# Patient Record
Sex: MALE | Race: WHITE | URBAN - METROPOLITAN AREA
[De-identification: names, ages, dates, MRNs, and addresses within clinical notes are randomized per-mention and may not be internally consistent; named-entity substitution may affect disease eponyms.]

---

## 2017-07-13 ENCOUNTER — RECORDS - HEALTHEAST (OUTPATIENT)
Dept: LAB | Facility: CLINIC | Age: 82
End: 2017-07-13

## 2017-07-13 LAB
CHOLEST SERPL-MCNC: 123 MG/DL
FASTING STATUS PATIENT QL REPORTED: ABNORMAL
HDLC SERPL-MCNC: 33 MG/DL
LDLC SERPL CALC-MCNC: 53 MG/DL
TRIGL SERPL-MCNC: 183 MG/DL

## 2017-07-14 ENCOUNTER — MEDICAL CORRESPONDENCE (OUTPATIENT)
Dept: HEALTH INFORMATION MANAGEMENT | Facility: CLINIC | Age: 82
End: 2017-07-14

## 2018-01-03 ENCOUNTER — RECORDS - HEALTHEAST (OUTPATIENT)
Dept: LAB | Facility: CLINIC | Age: 83
End: 2018-01-03

## 2018-01-03 ENCOUNTER — TRANSFERRED RECORDS (OUTPATIENT)
Dept: HEALTH INFORMATION MANAGEMENT | Facility: CLINIC | Age: 83
End: 2018-01-03

## 2018-01-03 LAB
ALBUMIN SERPL-MCNC: 3.8 G/DL (ref 3.5–5)
ALP SERPL-CCNC: 70 U/L (ref 45–120)
ALT SERPL W P-5'-P-CCNC: 16 U/L (ref 0–45)
ANION GAP SERPL CALCULATED.3IONS-SCNC: 14 MMOL/L (ref 5–18)
AST SERPL W P-5'-P-CCNC: 14 U/L (ref 0–40)
BILIRUB SERPL-MCNC: 0.9 MG/DL (ref 0–1)
BUN SERPL-MCNC: 53 MG/DL (ref 8–28)
CALCIUM SERPL-MCNC: 9.2 MG/DL (ref 8.5–10.5)
CHLORIDE BLD-SCNC: 92 MMOL/L (ref 98–107)
CHOLEST SERPL-MCNC: 94 MG/DL
CO2 SERPL-SCNC: 22 MMOL/L (ref 22–31)
CREAT SERPL-MCNC: 3.34 MG/DL (ref 0.7–1.3)
FASTING STATUS PATIENT QL REPORTED: ABNORMAL
GFR SERPL CREATININE-BSD FRML MDRD: 17 ML/MIN/1.73M2
GLUCOSE BLD-MCNC: 654 MG/DL (ref 70–125)
HDLC SERPL-MCNC: 24 MG/DL
LDLC SERPL CALC-MCNC: 25 MG/DL
POTASSIUM BLD-SCNC: 4.2 MMOL/L (ref 3.5–5)
PROT SERPL-MCNC: 6.6 G/DL (ref 6–8)
SODIUM SERPL-SCNC: 128 MMOL/L (ref 136–145)
TRIGL SERPL-MCNC: 226 MG/DL

## 2018-01-05 ENCOUNTER — RECORDS - HEALTHEAST (OUTPATIENT)
Dept: LAB | Facility: CLINIC | Age: 83
End: 2018-01-05

## 2018-01-05 LAB
ANION GAP SERPL CALCULATED.3IONS-SCNC: 12 MMOL/L (ref 5–18)
BUN SERPL-MCNC: 62 MG/DL (ref 8–28)
CALCIUM SERPL-MCNC: 8.9 MG/DL (ref 8.5–10.5)
CHLORIDE BLD-SCNC: 94 MMOL/L (ref 98–107)
CO2 SERPL-SCNC: 20 MMOL/L (ref 22–31)
CREAT SERPL-MCNC: 3.87 MG/DL (ref 0.7–1.3)
GFR SERPL CREATININE-BSD FRML MDRD: 15 ML/MIN/1.73M2
GLUCOSE BLD-MCNC: 662 MG/DL (ref 70–125)
POTASSIUM BLD-SCNC: 4.4 MMOL/L (ref 3.5–5)
SODIUM SERPL-SCNC: 126 MMOL/L (ref 136–145)

## 2018-01-07 ENCOUNTER — APPOINTMENT (OUTPATIENT)
Dept: CT IMAGING | Facility: CLINIC | Age: 83
End: 2018-01-07
Attending: FAMILY MEDICINE
Payer: COMMERCIAL

## 2018-01-07 ENCOUNTER — MEDICAL CORRESPONDENCE (OUTPATIENT)
Dept: HEALTH INFORMATION MANAGEMENT | Facility: CLINIC | Age: 83
End: 2018-01-07

## 2018-01-07 ENCOUNTER — HOSPITAL ENCOUNTER (EMERGENCY)
Facility: CLINIC | Age: 83
Discharge: ANOTHER HOSPITAL | End: 2018-01-08
Attending: FAMILY MEDICINE | Admitting: FAMILY MEDICINE
Payer: COMMERCIAL

## 2018-01-07 DIAGNOSIS — N17.9 ACUTE KIDNEY INJURY (H): ICD-10-CM

## 2018-01-07 DIAGNOSIS — I61.9: ICD-10-CM
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
## 2018-01-07 DIAGNOSIS — Z79.4 TYPE 2 DIABETES MELLITUS WITH HYPERGLYCEMIA, WITH LONG-TERM CURRENT USE OF INSULIN (H): ICD-10-CM

## 2018-01-07 DIAGNOSIS — E11.65 TYPE 2 DIABETES MELLITUS WITH HYPERGLYCEMIA, WITH LONG-TERM CURRENT USE OF INSULIN (H): ICD-10-CM

## 2018-01-07 DIAGNOSIS — S01.512A LACERATION OF TONGUE, INITIAL ENCOUNTER: ICD-10-CM

## 2018-01-07 LAB
ALBUMIN SERPL-MCNC: 3.9 G/DL (ref 3.4–5)
ALP SERPL-CCNC: 74 U/L (ref 40–150)
ALT SERPL W P-5'-P-CCNC: 21 U/L (ref 0–70)
ANION GAP SERPL CALCULATED.3IONS-SCNC: 14 MMOL/L (ref 3–14)
AST SERPL W P-5'-P-CCNC: 20 U/L (ref 0–45)
BASE DEFICIT BLDV-SCNC: 1.2 MMOL/L
BASOPHILS # BLD AUTO: 0 10E9/L (ref 0–0.2)
BASOPHILS NFR BLD AUTO: 0.1 %
BILIRUB SERPL-MCNC: 0.7 MG/DL (ref 0.2–1.3)
BUN SERPL-MCNC: 70 MG/DL (ref 7–30)
CALCIUM SERPL-MCNC: 10.9 MG/DL (ref 8.5–10.1)
CHLORIDE SERPL-SCNC: 96 MMOL/L (ref 94–109)
CO2 SERPL-SCNC: 22 MMOL/L (ref 20–32)
CREAT SERPL-MCNC: 3.3 MG/DL (ref 0.66–1.25)
DIFFERENTIAL METHOD BLD: ABNORMAL
EOSINOPHIL # BLD AUTO: 0 10E9/L (ref 0–0.7)
EOSINOPHIL NFR BLD AUTO: 0.4 %
ERYTHROCYTE [DISTWIDTH] IN BLOOD BY AUTOMATED COUNT: 12.3 % (ref 10–15)
GFR SERPL CREATININE-BSD FRML MDRD: 18 ML/MIN/1.7M2
GLUCOSE BLDC GLUCOMTR-MCNC: 481 MG/DL (ref 70–99)
GLUCOSE SERPL-MCNC: 472 MG/DL (ref 70–99)
HCO3 BLDV-SCNC: 25 MMOL/L (ref 21–28)
HCT VFR BLD AUTO: 37.3 % (ref 40–53)
HGB BLD-MCNC: 13.5 G/DL (ref 13.3–17.7)
IMM GRANULOCYTES # BLD: 0 10E9/L (ref 0–0.4)
IMM GRANULOCYTES NFR BLD: 0.2 %
KETONES BLD-SCNC: 1.1 MMOL/L (ref 0–0.6)
LYMPHOCYTES # BLD AUTO: 1 10E9/L (ref 0.8–5.3)
LYMPHOCYTES NFR BLD AUTO: 11.9 %
MCH RBC QN AUTO: 31.8 PG (ref 26.5–33)
MCHC RBC AUTO-ENTMCNC: 36.2 G/DL (ref 31.5–36.5)
MCV RBC AUTO: 88 FL (ref 78–100)
MONOCYTES # BLD AUTO: 0.5 10E9/L (ref 0–1.3)
MONOCYTES NFR BLD AUTO: 6 %
NEUTROPHILS # BLD AUTO: 6.6 10E9/L (ref 1.6–8.3)
NEUTROPHILS NFR BLD AUTO: 81.4 %
PCO2 BLDV: 45 MM HG (ref 40–50)
PH BLDV: 7.35 PH (ref 7.32–7.43)
PLATELET # BLD AUTO: 139 10E9/L (ref 150–450)
PO2 BLDV: 19 MM HG (ref 25–47)
POTASSIUM SERPL-SCNC: 4.1 MMOL/L (ref 3.4–5.3)
PROT SERPL-MCNC: 7.4 G/DL (ref 6.8–8.8)
RADIOLOGIST FLAGS: ABNORMAL
RBC # BLD AUTO: 4.25 10E12/L (ref 4.4–5.9)
SODIUM SERPL-SCNC: 132 MMOL/L (ref 133–144)
WBC # BLD AUTO: 8.1 10E9/L (ref 4–11)

## 2018-01-07 PROCEDURE — 93010 ELECTROCARDIOGRAM REPORT: CPT | Mod: Z6 | Performed by: FAMILY MEDICINE

## 2018-01-07 PROCEDURE — 81015 MICROSCOPIC EXAM OF URINE: CPT | Performed by: FAMILY MEDICINE

## 2018-01-07 PROCEDURE — 80053 COMPREHEN METABOLIC PANEL: CPT | Performed by: FAMILY MEDICINE

## 2018-01-07 PROCEDURE — 25000128 H RX IP 250 OP 636: Performed by: FAMILY MEDICINE

## 2018-01-07 PROCEDURE — 99285 EMERGENCY DEPT VISIT HI MDM: CPT | Mod: 25

## 2018-01-07 PROCEDURE — 00000146 ZZHCL STATISTIC GLUCOSE BY METER IP

## 2018-01-07 PROCEDURE — 82803 BLOOD GASES ANY COMBINATION: CPT | Performed by: FAMILY MEDICINE

## 2018-01-07 PROCEDURE — 72125 CT NECK SPINE W/O DYE: CPT

## 2018-01-07 PROCEDURE — 70450 CT HEAD/BRAIN W/O DYE: CPT

## 2018-01-07 PROCEDURE — 85025 COMPLETE CBC W/AUTO DIFF WBC: CPT | Performed by: FAMILY MEDICINE

## 2018-01-07 PROCEDURE — 82010 KETONE BODYS QUAN: CPT | Performed by: FAMILY MEDICINE

## 2018-01-07 PROCEDURE — 93005 ELECTROCARDIOGRAM TRACING: CPT

## 2018-01-07 PROCEDURE — 96374 THER/PROPH/DIAG INJ IV PUSH: CPT

## 2018-01-07 PROCEDURE — 81003 URINALYSIS AUTO W/O SCOPE: CPT | Performed by: FAMILY MEDICINE

## 2018-01-07 PROCEDURE — 96361 HYDRATE IV INFUSION ADD-ON: CPT

## 2018-01-07 PROCEDURE — 25000131 ZZH RX MED GY IP 250 OP 636 PS 637: Performed by: FAMILY MEDICINE

## 2018-01-07 PROCEDURE — 99291 CRITICAL CARE FIRST HOUR: CPT | Mod: 25 | Performed by: FAMILY MEDICINE

## 2018-01-07 PROCEDURE — 87086 URINE CULTURE/COLONY COUNT: CPT | Performed by: FAMILY MEDICINE

## 2018-01-07 RX ORDER — SODIUM CHLORIDE 9 MG/ML
1000 INJECTION, SOLUTION INTRAVENOUS CONTINUOUS
Status: DISCONTINUED | OUTPATIENT
Start: 2018-01-07 | End: 2018-01-08 | Stop reason: HOSPADM

## 2018-01-07 RX ORDER — LORAZEPAM 2 MG/ML
0.5 INJECTION INTRAMUSCULAR ONCE
Status: COMPLETED | OUTPATIENT
Start: 2018-01-07 | End: 2018-01-07

## 2018-01-07 RX ADMIN — SODIUM CHLORIDE 1000 ML: 9 INJECTION, SOLUTION INTRAVENOUS at 22:24

## 2018-01-07 RX ADMIN — INSULIN HUMAN 10 UNITS: 100 INJECTION, SOLUTION PARENTERAL at 22:25

## 2018-01-07 RX ADMIN — LORAZEPAM 0.5 MG: 2 INJECTION INTRAMUSCULAR; INTRAVENOUS at 21:56

## 2018-01-08 ENCOUNTER — APPOINTMENT (OUTPATIENT)
Dept: PHYSICAL THERAPY | Facility: CLINIC | Age: 83
DRG: 637 | End: 2018-01-08
Attending: STUDENT IN AN ORGANIZED HEALTH CARE EDUCATION/TRAINING PROGRAM
Payer: COMMERCIAL

## 2018-01-08 ENCOUNTER — APPOINTMENT (OUTPATIENT)
Dept: SPEECH THERAPY | Facility: CLINIC | Age: 83
DRG: 637 | End: 2018-01-08
Attending: STUDENT IN AN ORGANIZED HEALTH CARE EDUCATION/TRAINING PROGRAM
Payer: COMMERCIAL

## 2018-01-08 ENCOUNTER — HOSPITAL ENCOUNTER (INPATIENT)
Facility: CLINIC | Age: 83
LOS: 10 days | Discharge: NURSING, REHAB OR LONG-TERM CARE | DRG: 637 | End: 2018-01-18
Attending: PSYCHIATRY & NEUROLOGY | Admitting: PSYCHIATRY & NEUROLOGY
Payer: COMMERCIAL

## 2018-01-08 ENCOUNTER — APPOINTMENT (OUTPATIENT)
Dept: CT IMAGING | Facility: CLINIC | Age: 83
DRG: 637 | End: 2018-01-08
Attending: STUDENT IN AN ORGANIZED HEALTH CARE EDUCATION/TRAINING PROGRAM
Payer: COMMERCIAL

## 2018-01-08 VITALS
SYSTOLIC BLOOD PRESSURE: 107 MMHG | TEMPERATURE: 98.3 F | OXYGEN SATURATION: 97 % | HEART RATE: 113 BPM | WEIGHT: 190 LBS | RESPIRATION RATE: 18 BRPM | DIASTOLIC BLOOD PRESSURE: 64 MMHG

## 2018-01-08 DIAGNOSIS — E11.00 UNCONTROLLED TYPE 2 DIABETES MELLITUS WITH HYPEROSMOLAR NONKETOTIC HYPERGLYCEMIA (H): ICD-10-CM

## 2018-01-08 DIAGNOSIS — F03.90 DEMENTIA WITHOUT BEHAVIORAL DISTURBANCE, UNSPECIFIED DEMENTIA TYPE: ICD-10-CM

## 2018-01-08 DIAGNOSIS — N40.1 BENIGN PROSTATIC HYPERPLASIA WITH WEAK URINARY STREAM: ICD-10-CM

## 2018-01-08 DIAGNOSIS — R39.12 BENIGN PROSTATIC HYPERPLASIA WITH WEAK URINARY STREAM: ICD-10-CM

## 2018-01-08 DIAGNOSIS — L30.8 OTHER ECZEMA: ICD-10-CM

## 2018-01-08 DIAGNOSIS — R73.9 HYPERGLYCEMIA: ICD-10-CM

## 2018-01-08 DIAGNOSIS — L01.00 IMPETIGO: ICD-10-CM

## 2018-01-08 DIAGNOSIS — I10 BENIGN ESSENTIAL HYPERTENSION: ICD-10-CM

## 2018-01-08 DIAGNOSIS — I25.810 CORONARY ARTERY DISEASE INVOLVING AUTOLOGOUS ARTERY CORONARY BYPASS GRAFT WITHOUT ANGINA PECTORIS: ICD-10-CM

## 2018-01-08 DIAGNOSIS — C44.41 BASAL CELL CARCINOMA, SCALP/NECK: ICD-10-CM

## 2018-01-08 DIAGNOSIS — R25.8 CHOREIFORM MOVEMENT: Primary | ICD-10-CM

## 2018-01-08 PROBLEM — I61.9 ICH (INTRACEREBRAL HEMORRHAGE) (H): Status: ACTIVE | Noted: 2018-01-08

## 2018-01-08 LAB
ALBUMIN UR-MCNC: 10 MG/DL
ANION GAP SERPL CALCULATED.3IONS-SCNC: 12 MMOL/L (ref 3–14)
ANION GAP SERPL CALCULATED.3IONS-SCNC: 8 MMOL/L (ref 3–14)
APPEARANCE UR: CLEAR
BASE DEFICIT BLDV-SCNC: 1.3 MMOL/L
BILIRUB UR QL STRIP: NEGATIVE
BUN SERPL-MCNC: 54 MG/DL (ref 7–30)
BUN SERPL-MCNC: 64 MG/DL (ref 7–30)
CALCIUM SERPL-MCNC: 10.5 MG/DL (ref 8.5–10.1)
CALCIUM SERPL-MCNC: 9.8 MG/DL (ref 8.5–10.1)
CHLORIDE SERPL-SCNC: 105 MMOL/L (ref 94–109)
CHLORIDE SERPL-SCNC: 107 MMOL/L (ref 94–109)
CO2 SERPL-SCNC: 23 MMOL/L (ref 20–32)
CO2 SERPL-SCNC: 24 MMOL/L (ref 20–32)
COLOR UR AUTO: YELLOW
CREAT SERPL-MCNC: 2.24 MG/DL (ref 0.66–1.25)
CREAT SERPL-MCNC: 2.71 MG/DL (ref 0.66–1.25)
ERYTHROCYTE [DISTWIDTH] IN BLOOD BY AUTOMATED COUNT: 12.9 % (ref 10–15)
GFR SERPL CREATININE-BSD FRML MDRD: 22 ML/MIN/1.7M2
GFR SERPL CREATININE-BSD FRML MDRD: 28 ML/MIN/1.7M2
GLUCOSE BLDC GLUCOMTR-MCNC: 106 MG/DL (ref 70–99)
GLUCOSE BLDC GLUCOMTR-MCNC: 111 MG/DL (ref 70–99)
GLUCOSE BLDC GLUCOMTR-MCNC: 117 MG/DL (ref 70–99)
GLUCOSE BLDC GLUCOMTR-MCNC: 154 MG/DL (ref 70–99)
GLUCOSE BLDC GLUCOMTR-MCNC: 158 MG/DL (ref 70–99)
GLUCOSE BLDC GLUCOMTR-MCNC: 162 MG/DL (ref 70–99)
GLUCOSE BLDC GLUCOMTR-MCNC: 167 MG/DL (ref 70–99)
GLUCOSE BLDC GLUCOMTR-MCNC: 172 MG/DL (ref 70–99)
GLUCOSE BLDC GLUCOMTR-MCNC: 192 MG/DL (ref 70–99)
GLUCOSE BLDC GLUCOMTR-MCNC: 196 MG/DL (ref 70–99)
GLUCOSE BLDC GLUCOMTR-MCNC: 207 MG/DL (ref 70–99)
GLUCOSE BLDC GLUCOMTR-MCNC: 208 MG/DL (ref 70–99)
GLUCOSE BLDC GLUCOMTR-MCNC: 208 MG/DL (ref 70–99)
GLUCOSE BLDC GLUCOMTR-MCNC: 211 MG/DL (ref 70–99)
GLUCOSE BLDC GLUCOMTR-MCNC: 211 MG/DL (ref 70–99)
GLUCOSE BLDC GLUCOMTR-MCNC: 234 MG/DL (ref 70–99)
GLUCOSE BLDC GLUCOMTR-MCNC: 236 MG/DL (ref 70–99)
GLUCOSE BLDC GLUCOMTR-MCNC: 91 MG/DL (ref 70–99)
GLUCOSE SERPL-MCNC: 222 MG/DL (ref 70–99)
GLUCOSE SERPL-MCNC: 228 MG/DL (ref 70–99)
GLUCOSE UR STRIP-MCNC: >1000 MG/DL
HBA1C MFR BLD: 14.1 % (ref 4.3–6)
HCO3 BLDV-SCNC: 23 MMOL/L (ref 21–28)
HCT VFR BLD AUTO: 37.7 % (ref 40–53)
HGB BLD-MCNC: 13.1 G/DL (ref 13.3–17.7)
HGB UR QL STRIP: ABNORMAL
HYALINE CASTS #/AREA URNS LPF: 4 /LPF (ref 0–2)
INTERPRETATION ECG - MUSE: NORMAL
KETONES UR STRIP-MCNC: 5 MG/DL
LEUKOCYTE ESTERASE UR QL STRIP: NEGATIVE
MAGNESIUM SERPL-MCNC: 2.1 MG/DL (ref 1.6–2.3)
MCH RBC QN AUTO: 31.8 PG (ref 26.5–33)
MCHC RBC AUTO-ENTMCNC: 34.7 G/DL (ref 31.5–36.5)
MCV RBC AUTO: 92 FL (ref 78–100)
MRSA DNA SPEC QL NAA+PROBE: NEGATIVE
MUCOUS THREADS #/AREA URNS LPF: PRESENT /LPF
NITRATE UR QL: NEGATIVE
O2/TOTAL GAS SETTING VFR VENT: 21 %
PCO2 BLDV: 37 MM HG (ref 40–50)
PH BLDV: 7.4 PH (ref 7.32–7.43)
PH UR STRIP: 5.5 PH (ref 5–7)
PHOSPHATE SERPL-MCNC: 3.8 MG/DL (ref 2.5–4.5)
PLATELET # BLD AUTO: 111 10E9/L (ref 150–450)
PO2 BLDV: 40 MM HG (ref 25–47)
POTASSIUM SERPL-SCNC: 3.6 MMOL/L (ref 3.4–5.3)
POTASSIUM SERPL-SCNC: 3.6 MMOL/L (ref 3.4–5.3)
RBC # BLD AUTO: 4.12 10E12/L (ref 4.4–5.9)
RBC #/AREA URNS AUTO: 7 /HPF (ref 0–2)
SODIUM SERPL-SCNC: 140 MMOL/L (ref 133–144)
SODIUM SERPL-SCNC: 140 MMOL/L (ref 133–144)
SOURCE: ABNORMAL
SP GR UR STRIP: 1.01 (ref 1–1.03)
SPECIMEN SOURCE: NORMAL
UROBILINOGEN UR STRIP-MCNC: NORMAL MG/DL (ref 0–2)
WBC # BLD AUTO: 7.8 10E9/L (ref 4–11)
WBC #/AREA URNS AUTO: 2 /HPF (ref 0–2)

## 2018-01-08 PROCEDURE — 25000132 ZZH RX MED GY IP 250 OP 250 PS 637: Performed by: STUDENT IN AN ORGANIZED HEALTH CARE EDUCATION/TRAINING PROGRAM

## 2018-01-08 PROCEDURE — 00000146 ZZHCL STATISTIC GLUCOSE BY METER IP

## 2018-01-08 PROCEDURE — 40000225 ZZH STATISTIC SLP WARD VISIT

## 2018-01-08 PROCEDURE — 80048 BASIC METABOLIC PNL TOTAL CA: CPT | Performed by: PHYSICIAN ASSISTANT

## 2018-01-08 PROCEDURE — 27210995 ZZH RX 272

## 2018-01-08 PROCEDURE — 83036 HEMOGLOBIN GLYCOSYLATED A1C: CPT | Performed by: PSYCHIATRY & NEUROLOGY

## 2018-01-08 PROCEDURE — 92610 EVALUATE SWALLOWING FUNCTION: CPT | Mod: GN

## 2018-01-08 PROCEDURE — 87641 MR-STAPH DNA AMP PROBE: CPT | Performed by: INTERNAL MEDICINE

## 2018-01-08 PROCEDURE — 99233 SBSQ HOSP IP/OBS HIGH 50: CPT | Performed by: PHYSICIAN ASSISTANT

## 2018-01-08 PROCEDURE — 97530 THERAPEUTIC ACTIVITIES: CPT | Mod: GP

## 2018-01-08 PROCEDURE — 84100 ASSAY OF PHOSPHORUS: CPT | Performed by: PSYCHIATRY & NEUROLOGY

## 2018-01-08 PROCEDURE — 40000556 ZZH STATISTIC PERIPHERAL IV START W US GUIDANCE

## 2018-01-08 PROCEDURE — 25000125 ZZHC RX 250: Performed by: STUDENT IN AN ORGANIZED HEALTH CARE EDUCATION/TRAINING PROGRAM

## 2018-01-08 PROCEDURE — 80048 BASIC METABOLIC PNL TOTAL CA: CPT | Performed by: PSYCHIATRY & NEUROLOGY

## 2018-01-08 PROCEDURE — 27210995 ZZH RX 272: Performed by: NURSE PRACTITIONER

## 2018-01-08 PROCEDURE — 40000193 ZZH STATISTIC PT WARD VISIT

## 2018-01-08 PROCEDURE — 36415 COLL VENOUS BLD VENIPUNCTURE: CPT | Performed by: PHYSICIAN ASSISTANT

## 2018-01-08 PROCEDURE — 99207 ZZC CDG-MDM COMPONENT: MEETS MODERATE - UP CODED: CPT | Performed by: PHYSICIAN ASSISTANT

## 2018-01-08 PROCEDURE — 93010 ELECTROCARDIOGRAM REPORT: CPT | Performed by: INTERNAL MEDICINE

## 2018-01-08 PROCEDURE — 85027 COMPLETE CBC AUTOMATED: CPT | Performed by: PSYCHIATRY & NEUROLOGY

## 2018-01-08 PROCEDURE — 82803 BLOOD GASES ANY COMBINATION: CPT | Performed by: PHYSICIAN ASSISTANT

## 2018-01-08 PROCEDURE — 97162 PT EVAL MOD COMPLEX 30 MIN: CPT | Mod: GP

## 2018-01-08 PROCEDURE — 87640 STAPH A DNA AMP PROBE: CPT | Performed by: INTERNAL MEDICINE

## 2018-01-08 PROCEDURE — 36415 COLL VENOUS BLD VENIPUNCTURE: CPT | Performed by: PSYCHIATRY & NEUROLOGY

## 2018-01-08 PROCEDURE — 25800025 ZZH RX 258: Performed by: PHYSICIAN ASSISTANT

## 2018-01-08 PROCEDURE — 25000128 H RX IP 250 OP 636

## 2018-01-08 PROCEDURE — 83735 ASSAY OF MAGNESIUM: CPT | Performed by: PHYSICIAN ASSISTANT

## 2018-01-08 PROCEDURE — 12000024 ZZH R&B TRANSPLANT CRITICAL

## 2018-01-08 PROCEDURE — 93005 ELECTROCARDIOGRAM TRACING: CPT

## 2018-01-08 PROCEDURE — 25000128 H RX IP 250 OP 636: Performed by: STUDENT IN AN ORGANIZED HEALTH CARE EDUCATION/TRAINING PROGRAM

## 2018-01-08 PROCEDURE — 70450 CT HEAD/BRAIN W/O DYE: CPT

## 2018-01-08 PROCEDURE — 83735 ASSAY OF MAGNESIUM: CPT | Performed by: PSYCHIATRY & NEUROLOGY

## 2018-01-08 RX ORDER — ASPIRIN 81 MG/1
81 TABLET, CHEWABLE ORAL DAILY
Status: DISCONTINUED | OUTPATIENT
Start: 2018-01-08 | End: 2018-01-08

## 2018-01-08 RX ORDER — PRAVASTATIN SODIUM 10 MG
20 TABLET ORAL EVERY EVENING
Status: DISCONTINUED | OUTPATIENT
Start: 2018-01-08 | End: 2018-01-08

## 2018-01-08 RX ORDER — LISINOPRIL 20 MG/1
20 TABLET ORAL DAILY
Status: DISCONTINUED | OUTPATIENT
Start: 2018-01-08 | End: 2018-01-08

## 2018-01-08 RX ORDER — DEXTROSE MONOHYDRATE 25 G/50ML
25 INJECTION, SOLUTION INTRAVENOUS ONCE
Status: DISCONTINUED | OUTPATIENT
Start: 2018-01-08 | End: 2018-01-08 | Stop reason: CLARIF

## 2018-01-08 RX ORDER — SODIUM CHLORIDE 9 MG/ML
INJECTION, SOLUTION INTRAVENOUS CONTINUOUS
Status: DISCONTINUED | OUTPATIENT
Start: 2018-01-08 | End: 2018-01-08

## 2018-01-08 RX ORDER — HYDRALAZINE HYDROCHLORIDE 20 MG/ML
10-20 INJECTION INTRAMUSCULAR; INTRAVENOUS
Status: DISCONTINUED | OUTPATIENT
Start: 2018-01-08 | End: 2018-01-18 | Stop reason: HOSPADM

## 2018-01-08 RX ORDER — LABETALOL HYDROCHLORIDE 5 MG/ML
10 INJECTION, SOLUTION INTRAVENOUS EVERY 10 MIN PRN
Status: DISCONTINUED | OUTPATIENT
Start: 2018-01-08 | End: 2018-01-08

## 2018-01-08 RX ORDER — SODIUM CHLORIDE 450 MG/100ML
INJECTION, SOLUTION INTRAVENOUS
Status: COMPLETED
Start: 2018-01-08 | End: 2018-01-08

## 2018-01-08 RX ORDER — NALOXONE HYDROCHLORIDE 0.4 MG/ML
.1-.4 INJECTION, SOLUTION INTRAMUSCULAR; INTRAVENOUS; SUBCUTANEOUS
Status: DISCONTINUED | OUTPATIENT
Start: 2018-01-08 | End: 2018-01-18 | Stop reason: HOSPADM

## 2018-01-08 RX ORDER — DEXTROSE MONOHYDRATE 25 G/50ML
25-50 INJECTION, SOLUTION INTRAVENOUS
Status: DISCONTINUED | OUTPATIENT
Start: 2018-01-08 | End: 2018-01-18 | Stop reason: HOSPADM

## 2018-01-08 RX ORDER — SODIUM CHLORIDE 450 MG/100ML
INJECTION, SOLUTION INTRAVENOUS CONTINUOUS
Status: DISCONTINUED | OUTPATIENT
Start: 2018-01-08 | End: 2018-01-08

## 2018-01-08 RX ORDER — LORAZEPAM 2 MG/ML
0.5 INJECTION INTRAMUSCULAR ONCE
Status: COMPLETED | OUTPATIENT
Start: 2018-01-08 | End: 2018-01-08

## 2018-01-08 RX ORDER — HYDROCHLOROTHIAZIDE 25 MG/1
25 TABLET ORAL DAILY
Status: DISCONTINUED | OUTPATIENT
Start: 2018-01-08 | End: 2018-01-08

## 2018-01-08 RX ORDER — IMIQUIMOD 12.5 MG/.25G
1 CREAM TOPICAL
Status: DISCONTINUED | OUTPATIENT
Start: 2018-01-08 | End: 2018-01-18 | Stop reason: HOSPADM

## 2018-01-08 RX ORDER — TAMSULOSIN HYDROCHLORIDE 0.4 MG/1
0.4 CAPSULE ORAL DAILY
Status: DISCONTINUED | OUTPATIENT
Start: 2018-01-08 | End: 2018-01-18 | Stop reason: HOSPADM

## 2018-01-08 RX ORDER — MUPIROCIN CALCIUM 20 MG/G
CREAM TOPICAL 2 TIMES DAILY
Status: DISCONTINUED | OUTPATIENT
Start: 2018-01-08 | End: 2018-01-18 | Stop reason: HOSPADM

## 2018-01-08 RX ORDER — HYDROCHLOROTHIAZIDE 12.5 MG/1
25 TABLET ORAL DAILY
Status: DISCONTINUED | OUTPATIENT
Start: 2018-01-08 | End: 2018-01-08

## 2018-01-08 RX ORDER — LABETALOL HYDROCHLORIDE 5 MG/ML
20 INJECTION, SOLUTION INTRAVENOUS EVERY 4 HOURS PRN
Status: DISCONTINUED | OUTPATIENT
Start: 2018-01-08 | End: 2018-01-15

## 2018-01-08 RX ORDER — TAMSULOSIN HYDROCHLORIDE 0.4 MG/1
0.4 CAPSULE ORAL DAILY
Status: DISCONTINUED | OUTPATIENT
Start: 2018-01-08 | End: 2018-01-08

## 2018-01-08 RX ORDER — LORAZEPAM 2 MG/ML
INJECTION INTRAMUSCULAR
Status: COMPLETED
Start: 2018-01-08 | End: 2018-01-08

## 2018-01-08 RX ORDER — HYDROCHLOROTHIAZIDE 12.5 MG/1
25 CAPSULE ORAL DAILY
Status: DISCONTINUED | OUTPATIENT
Start: 2018-01-08 | End: 2018-01-08

## 2018-01-08 RX ORDER — PRAVASTATIN SODIUM 10 MG
20 TABLET ORAL DAILY
Status: DISCONTINUED | OUTPATIENT
Start: 2018-01-08 | End: 2018-01-08

## 2018-01-08 RX ORDER — PRAVASTATIN SODIUM 10 MG
20 TABLET ORAL EVERY EVENING
Status: DISCONTINUED | OUTPATIENT
Start: 2018-01-08 | End: 2018-01-18 | Stop reason: HOSPADM

## 2018-01-08 RX ORDER — NICOTINE POLACRILEX 4 MG
15-30 LOZENGE BUCCAL
Status: DISCONTINUED | OUTPATIENT
Start: 2018-01-08 | End: 2018-01-18 | Stop reason: HOSPADM

## 2018-01-08 RX ORDER — LORAZEPAM 2 MG/ML
1 INJECTION INTRAMUSCULAR
Status: COMPLETED | OUTPATIENT
Start: 2018-01-08 | End: 2018-01-08

## 2018-01-08 RX ADMIN — TAMSULOSIN HYDROCHLORIDE 0.4 MG: 0.4 CAPSULE ORAL at 11:40

## 2018-01-08 RX ADMIN — HUMAN INSULIN 2 UNITS/HR: 100 INJECTION, SOLUTION SUBCUTANEOUS at 19:01

## 2018-01-08 RX ADMIN — SODIUM CHLORIDE: 4.5 INJECTION, SOLUTION INTRAVENOUS at 13:27

## 2018-01-08 RX ADMIN — SODIUM CHLORIDE: 9 INJECTION, SOLUTION INTRAVENOUS at 05:56

## 2018-01-08 RX ADMIN — SODIUM CHLORIDE 500 ML: 4.5 INJECTION, SOLUTION INTRAVENOUS at 12:22

## 2018-01-08 RX ADMIN — IMIQUIMOD 0.25 G: 12.5 CREAM TOPICAL at 11:47

## 2018-01-08 RX ADMIN — LORAZEPAM 0.5 MG: 2 INJECTION INTRAMUSCULAR at 05:28

## 2018-01-08 RX ADMIN — LISINOPRIL 20 MG: 20 TABLET ORAL at 11:36

## 2018-01-08 RX ADMIN — HUMAN INSULIN 4 UNITS/HR: 100 INJECTION, SOLUTION SUBCUTANEOUS at 21:26

## 2018-01-08 RX ADMIN — HUMAN INSULIN 6 UNITS/HR: 100 INJECTION, SOLUTION SUBCUTANEOUS at 20:40

## 2018-01-08 RX ADMIN — HUMAN INSULIN 2 UNITS/HR: 100 INJECTION, SOLUTION SUBCUTANEOUS at 06:47

## 2018-01-08 RX ADMIN — HYDROCHLOROTHIAZIDE 25 MG: 12.5 TABLET ORAL at 11:41

## 2018-01-08 RX ADMIN — LORAZEPAM 0.5 MG: 2 INJECTION INTRAMUSCULAR; INTRAVENOUS at 05:28

## 2018-01-08 RX ADMIN — LORAZEPAM 1 MG: 2 INJECTION INTRAMUSCULAR; INTRAVENOUS at 14:32

## 2018-01-08 RX ADMIN — ASPIRIN 81 MG CHEWABLE TABLET 81 MG: 81 TABLET CHEWABLE at 11:35

## 2018-01-08 RX ADMIN — DEXTROSE AND SODIUM CHLORIDE: 5; 450 INJECTION, SOLUTION INTRAVENOUS at 22:06

## 2018-01-08 ASSESSMENT — VISUAL ACUITY
OU: GLASSES

## 2018-01-08 NOTE — IP AVS SNAPSHOT
"    UNIT [facility]: [phone]                                              INTERAGENCY TRANSFER FORM - LAB / IMAGING / EKG / EMG RESULTS   2018                    Hospital Admission Date: 2018  ILANA RUANO   : 1928  Sex: Male        Attending Provider: Minor Gonsalez MD     Allergies:  No Known Allergies    Infection:  None   Service:  NEURO ICU    Ht:  1.727 m (5' 8\")   Wt:  75.8 kg (167 lb 1.6 oz)   Admission Wt:  72.9 kg (160 lb 11.5 oz)    BMI:  25.41 kg/m 2   BSA:  1.91 m 2            Patient PCP Information     Provider PCP Type    Juan Gongora MD General         Lab Results - 3 Days      Glucose by meter [[id number]] (Abnormal)  Resulted: 18 0846, Result status: Final result    Ordering provider: Gabrielle Jerome MD  18 0840 Resulting lab: POINT OF CARE TEST, GLUCOSE    Specimen Information    Type Source Collected On     18 0840          Components       Value Reference Range Flag Lab   Glucose 169 70 - 99 mg/dL H 170            Glucose by meter [[id number]] (Abnormal)  Resulted: 18 0235, Result status: Final result    Ordering provider: Gabrielle Jerome MD  18 0232 Resulting lab: POINT OF CARE TEST, GLUCOSE    Specimen Information    Type Source Collected On     18 023          Components       Value Reference Range Flag Lab   Glucose 180 70 - 99 mg/dL H 170            Glucose by meter [[id number]] (Abnormal)  Resulted: 18, Result status: Final result    Ordering provider: Gabrielle Jerome MD  18 Resulting lab: POINT OF CARE TEST, GLUCOSE    Specimen Information    Type Source Collected On     18          Components       Value Reference Range Flag Lab   Glucose 253 70 - 99 mg/dL H 170            Glucose by meter [[id number]] (Abnormal)  Resulted: 18 1740, Result status: Final result    Ordering provider: Gabrielle Jerome MD  18 173 Resulting lab: POINT OF CARE TEST, " GLUCOSE    Specimen Information    Type Source Collected On     01/17/18 1735          Components       Value Reference Range Flag Lab   Glucose 177 70 - 99 mg/dL H 170            Glucose by meter [[id number]] (Abnormal)  Resulted: 01/17/18 1206, Result status: Final result    Ordering provider: Gabrielle Jerome MD  01/17/18 1202 Resulting lab: POINT OF CARE TEST, GLUCOSE    Specimen Information    Type Source Collected On     01/17/18 1202          Components       Value Reference Range Flag Lab   Glucose 224 70 - 99 mg/dL H 170            Basic metabolic panel [[id number]] (Abnormal)  Resulted: 01/17/18 0833, Result status: Final result    Ordering provider: Minor Gonsalez MD  01/16/18 2330 Resulting lab: [facility]    Specimen Information    Type Source Collected On   Blood  01/17/18 0804          Components       Value Reference Range Flag Lab   Sodium 141 133 - 144 mmol/L  51   Potassium 4.3 3.4 - 5.3 mmol/L  51   Chloride 110 94 - 109 mmol/L H 51   Carbon Dioxide 22 20 - 32 mmol/L  51   Anion Gap 8 3 - 14 mmol/L  51   Glucose 159 70 - 99 mg/dL H 51   Urea Nitrogen 13 7 - 30 mg/dL  51   Creatinine 1.38 0.66 - 1.25 mg/dL H 51   GFR Estimate 48 >60 mL/min/1.7m2 L 51   Comment:  Non  GFR Calc   GFR Estimate If Black 59 >60 mL/min/1.7m2 L 51   Comment:  African American GFR Calc   Calcium 8.9 8.5 - 10.1 mg/dL  51            Calcium ionized [[id number]]  Resulted: 01/17/18 0817, Result status: Final result    Ordering provider: Minor Gonsalez MD  01/16/18 2330 Resulting lab: [facility]    Specimen Information    Type Source Collected On   Blood  01/17/18 0804          Components       Value Reference Range Flag Lab   Calcium Ionized 4.9 4.4 - 5.2 mg/dL  51            CBC with platelets differential [[id number]] (Abnormal)  Resulted: 01/17/18 0816, Result status: Final result    Ordering provider: Minor Gonsalez MD  01/16/18  2330 Resulting lab: [facility]    Specimen Information    Type Source Collected On   Blood  01/17/18 0755          Components       Value Reference Range Flag Lab   WBC 3.6 4.0 - 11.0 10e9/L L 51   RBC Count 3.54 4.4 - 5.9 10e12/L L 51   Hemoglobin 11.3 13.3 - 17.7 g/dL L 51   Hematocrit 33.6 40.0 - 53.0 % L 51   MCV 95 78 - 100 fl  51   MCH 31.9 26.5 - 33.0 pg  51   MCHC 33.6 31.5 - 36.5 g/dL  51   RDW 13.7 10.0 - 15.0 %  51   Platelet Count 94 150 - 450 10e9/L L 51   Diff Method Automated Method   51   % Neutrophils 62.1 %  51   % Lymphocytes 28.7 %  51   % Monocytes 6.4 %  51   % Eosinophils 2.5 %  51   % Basophils 0.3 %  51   % Immature Granulocytes 0.0 %  51   Nucleated RBCs 0 0 /100  51   Absolute Neutrophil 2.2 1.6 - 8.3 10e9/L  51   Absolute Lymphocytes 1.0 0.8 - 5.3 10e9/L  51   Absolute Monocytes 0.2 0.0 - 1.3 10e9/L  51   Absolute Eosinophils 0.1 0.0 - 0.7 10e9/L  51   Absolute Basophils 0.0 0.0 - 0.2 10e9/L  51   Abs Immature Granulocytes 0.0 0 - 0.4 10e9/L  51   Absolute Nucleated RBC 0.0   51            Glucose by meter [[id number]] (Abnormal)  Resulted: 01/17/18 0807, Result status: Final result    Ordering provider: Gabrielle Jerome MD  01/17/18 0801 Resulting lab: POINT OF CARE TEST, GLUCOSE    Specimen Information    Type Source Collected On     01/17/18 0801          Components       Value Reference Range Flag Lab   Glucose 158 70 - 99 mg/dL H 170            Glucose by meter [[id number]] (Abnormal)  Resulted: 01/17/18 0140, Result status: Final result    Ordering provider: Gabrielle Jerome MD  01/17/18 0136 Resulting lab: POINT OF CARE TEST, GLUCOSE    Specimen Information    Type Source Collected On     01/17/18 0136          Components       Value Reference Range Flag Lab   Glucose 159 70 - 99 mg/dL H 170            Glucose by meter [[id number]] (Abnormal)  Resulted: 01/16/18 2120, Result status: Final result    Ordering provider: Gabrielle Jerome MD   01/16/18 2117 Resulting lab: POINT OF CARE TEST, GLUCOSE    Specimen Information    Type Source Collected On     01/16/18 2117          Components       Value Reference Range Flag Lab   Glucose 209 70 - 99 mg/dL H 170            Glucose by meter [[id number]] (Abnormal)  Resulted: 01/16/18 1705, Result status: Final result    Ordering provider: Gabrielle Jerome MD  01/16/18 1704 Resulting lab: POINT OF CARE TEST, GLUCOSE    Specimen Information    Type Source Collected On     01/16/18 1704          Components       Value Reference Range Flag Lab   Glucose 198 70 - 99 mg/dL H 170            Glucose by meter [[id number]] (Abnormal)  Resulted: 01/16/18 1405, Result status: Final result    Ordering provider: Gabrielle Jerome MD  01/16/18 1400 Resulting lab: POINT OF CARE TEST, GLUCOSE    Specimen Information    Type Source Collected On     01/16/18 1400          Components       Value Reference Range Flag Lab   Glucose 240 70 - 99 mg/dL H 170            Glucose by meter [[id number]] (Abnormal)  Resulted: 01/16/18 1141, Result status: Final result    Ordering provider: Gabrielle Jerome MD  01/16/18 1138 Resulting lab: POINT OF CARE TEST, GLUCOSE    Specimen Information    Type Source Collected On     01/16/18 1138          Components       Value Reference Range Flag Lab   Glucose 464 70 - 99 mg/dL H 170            Basic metabolic panel [[id number]] (Abnormal)  Resulted: 01/16/18 0721, Result status: Final result    Ordering provider: Cinthia Mckeon MD  01/15/18 2337 Resulting lab: [facility]    Specimen Information    Type Source Collected On   Blood  01/16/18 0646          Components       Value Reference Range Flag Lab   Sodium 142 133 - 144 mmol/L  51   Potassium 4.2 3.4 - 5.3 mmol/L  51   Chloride 112 94 - 109 mmol/L H 51   Carbon Dioxide 22 20 - 32 mmol/L  51   Anion Gap 8 3 - 14 mmol/L  51   Glucose 180 70 - 99 mg/dL H 51   Urea Nitrogen 15 7 - 30 mg/dL  51   Creatinine  1.34 0.66 - 1.25 mg/dL H 51   GFR Estimate 50 >60 mL/min/1.7m2 L 51   Comment:  Non  GFR Calc   GFR Estimate If Black 61 >60 mL/min/1.7m2  51   Comment:  African American GFR Calc   Calcium 8.1 8.5 - 10.1 mg/dL L 51            CBC (AM Draw) [[id number]] (Abnormal)  Resulted: 01/16/18 0709, Result status: Final result    Ordering provider: Cinthia Mckeon MD  01/15/18 2330 Resulting lab: [facility]    Specimen Information    Type Source Collected On   Blood  01/16/18 0646          Components       Value Reference Range Flag Lab   WBC 2.7 4.0 - 11.0 10e9/L L 51   RBC Count 3.08 4.4 - 5.9 10e12/L L 51   Hemoglobin 9.7 13.3 - 17.7 g/dL L 51   Hematocrit 29.1 40.0 - 53.0 % L 51   MCV 95 78 - 100 fl  51   MCH 31.5 26.5 - 33.0 pg  51   MCHC 33.3 31.5 - 36.5 g/dL  51   RDW 13.8 10.0 - 15.0 %  51   Platelet Count 79 150 - 450 10e9/L L 51            Glucose by meter [[id number]] (Abnormal)  Resulted: 01/16/18 0156, Result status: Final result    Ordering provider: Gabrielle Jerome MD  01/16/18 0153 Resulting lab: POINT OF CARE TEST, GLUCOSE    Specimen Information    Type Source Collected On     01/16/18 0153          Components       Value Reference Range Flag Lab   Glucose 226 70 - 99 mg/dL H 170            Glucose by meter [[id number]] (Abnormal)  Resulted: 01/15/18 2201, Result status: Final result    Ordering provider: Gabrielle Jerome MD  01/15/18 2155 Resulting lab: POINT OF CARE TEST, GLUCOSE    Specimen Information    Type Source Collected On     01/15/18 2155          Components       Value Reference Range Flag Lab   Glucose 244 70 - 99 mg/dL H 170            Glucose by meter [[id number]] (Abnormal)  Resulted: 01/15/18 1800, Result status: Final result    Ordering provider: Gabrielle Jerome MD  01/15/18 1758 Resulting lab: POINT OF CARE TEST, GLUCOSE    Specimen Information    Type Source Collected On     01/15/18 9704          Components       Value  Reference Range Flag Lab   Glucose 231 70 - 99 mg/dL H 170            Protein electrophoresis [[id number]] (Abnormal)  Resulted: 01/15/18 1422, Result status: Final result    Ordering provider: Cinthia Mckeon MD  01/12/18 0802 Resulting lab: [facility]    Specimen Information    Type Source Collected On   Blood  01/12/18 0935          Components       Value Reference Range Flag Lab   Albumin Fraction 3.4 3.7 - 5.1 g/dL L 51   Alpha 1 Fraction 0.3 0.2 - 0.4 g/dL  51   Alpha 2 Fraction 0.6 0.5 - 0.9 g/dL  51   Beta Fraction 0.6 0.6 - 1.0 g/dL  51   Gamma Fraction 0.6 0.7 - 1.6 g/dL L 51   Monoclonal Peak 0.0 0.0 g/dL  51   ELP Interpretation: --   51   Result:         Hypoalbuminemia and hypogammaglobulinemia. No monoclonal protein seen. Recommend a urine   for immunofixation to rule out a light chain secreting myeloma if myeloma is a serious   clinical consideration. Pathologic significance requires clinical correlation. DOT Reyes M.D., Ph.D., Pathologist ().               Protein electrophoresis random urine [[id number]] (Abnormal)  Resulted: 01/15/18 1421, Result status: Final result    Ordering provider: Cinthia Mckeon MD  01/12/18 0802 Resulting lab: [facility]    Specimen Information    Type Source Collected On   Urine Random Urine 01/12/18 0825          Components       Value Reference Range Flag Lab   Albumin Fraction Urine 22.8 0 % H 51   Alpha 1 Fraction Urine 8.0 0 % H 51   Alpha 2 Fraction Urine 9.4 0 % H 51   Beta Fraction Urine 14.5 0 % H 51   Gamma Fraction Urine 45.3 0 % H 51   Monoclonal Peak Urine 0.0 0% %  51   ELP Interpretation Urine (Note)   51   Comment:         Albumin and globulins seen. A protein band is seen in the gamma fraction,   which may represent a monoclonal immunoglobulin or free light chains.    Recommend urine immunofixation to characterize this band if clinically   indicated. We recommend a  first morning voided urine to detect clinically  significant proteinuria. A random specimen is not optimal for detecting   all proteins. The specific gravity of this specimen was only 1.010.   Pathological significance requires clinical correlation.    DOT Reyes M.D., Ph.D., Pathologist ()              Glucose by meter [[id number]] (Abnormal)  Resulted: 01/15/18 1411, Result status: Final result    Ordering provider: Gabrielle Jerome MD  01/15/18 1405 Resulting lab: POINT OF CARE TEST, GLUCOSE    Specimen Information    Type Source Collected On     01/15/18 1405          Components       Value Reference Range Flag Lab   Glucose 283 70 - 99 mg/dL H 170            Glucose by meter [[id number]] (Abnormal)  Resulted: 01/15/18 1156, Result status: Final result    Ordering provider: Gabrielle Jerome MD  01/15/18 1152 Resulting lab: POINT OF CARE TEST, GLUCOSE    Specimen Information    Type Source Collected On     01/15/18 1152          Components       Value Reference Range Flag Lab   Glucose 288 70 - 99 mg/dL H 170            Basic metabolic panel [[id number]] (Abnormal)  Resulted: 01/15/18 0916, Result status: Final result    Ordering provider: Cinthia Mckeon MD  01/14/18 2330 Resulting lab: [facility]    Specimen Information    Type Source Collected On   Blood  01/15/18 0804          Components       Value Reference Range Flag Lab   Sodium 142 133 - 144 mmol/L  51   Potassium 3.8 3.4 - 5.3 mmol/L  51   Chloride 114 94 - 109 mmol/L H 51   Carbon Dioxide 21 20 - 32 mmol/L  51   Anion Gap 7 3 - 14 mmol/L  51   Glucose 179 70 - 99 mg/dL H 51   Urea Nitrogen 17 7 - 30 mg/dL  51   Creatinine 1.46 0.66 - 1.25 mg/dL H 51   GFR Estimate 45 >60 mL/min/1.7m2 L 51   Comment:  Non  GFR Calc   GFR Estimate If Black 55 >60 mL/min/1.7m2 L 51   Comment:  African American GFR Calc   Calcium 8.0 8.5 - 10.1 mg/dL L 51            CBC (AM Draw) [[id number]]  (Abnormal)  Resulted: 01/15/18 0856, Result status: Final result    Ordering provider: Cinthia Mckeon MD  01/14/18 2330 Resulting lab: [facility]    Specimen Information    Type Source Collected On   Blood  01/15/18 0804          Components       Value Reference Range Flag Lab   WBC 2.6 4.0 - 11.0 10e9/L L 51   RBC Count 3.05 4.4 - 5.9 10e12/L L 51   Hemoglobin 9.6 13.3 - 17.7 g/dL L 51   Hematocrit 28.9 40.0 - 53.0 % L 51   MCV 95 78 - 100 fl  51   MCH 31.5 26.5 - 33.0 pg  51   MCHC 33.2 31.5 - 36.5 g/dL  51   RDW 13.8 10.0 - 15.0 %  51   Platelet Count 81 150 - 450 10e9/L L 51            Glucose by meter [[id number]] (Abnormal)  Resulted: 01/15/18 0845, Result status: Final result    Ordering provider: Gabrielle Jerome MD  01/15/18 0839 Resulting lab: POINT OF CARE TEST, GLUCOSE    Specimen Information    Type Source Collected On     01/15/18 0839          Components       Value Reference Range Flag Lab   Glucose 172 70 - 99 mg/dL H 170            Glucose by meter [[id number]] (Abnormal)  Resulted: 01/15/18 0130, Result status: Final result    Ordering provider: Gabrielle Jerome MD  01/15/18 0125 Resulting lab: POINT OF CARE TEST, GLUCOSE    Specimen Information    Type Source Collected On     01/15/18 0125          Components       Value Reference Range Flag Lab   Glucose 196 70 - 99 mg/dL H 170            Testing Performed By     Lab - Abbreviation Name Director Address Valid Date Range    51 - Unknown [facility] Unknown [address] [facility] [id number] 12/31/14 1010 - Present    170 - Unknown POINT OF CARE TEST, GLUCOSE Unknown Unknown 10/31/11 1114 - Present            Unresulted Labs (24h ago through future)    Start       Ordered    Unscheduled  Glucose - Diabetes  CONDITIONAL X 1 STAT,   STAT     Comments:  for changes in mental status, diaphoresis, or unexplained tachycardia    01/08/18 0342      Encounter-Level Documents:      There are no encounter-level documents.      Order-Level Documents:     There are no order-level documents.

## 2018-01-08 NOTE — IP AVS SNAPSHOT
` `     [address] [facility] BANK: [phone]            Medication Administration Report for Christopher Cuevas as of 01/18/18 1529   Legend:    Given Hold Not Given Due Canceled Entry Other Actions    Time Time (Time) Time  Time-Action       Inactive    Active    Linked        Medications 01/12/18 01/13/18 01/14/18 01/15/18 01/16/18 01/17/18 01/18/18    acetaminophen (TYLENOL) tablet 650 mg  Dose: 650 mg Freq: EVERY 6 HOURS PRN Route: PO  PRN Reasons: mild pain,fever  Start: 01/09/18 0554   Admin Instructions: Maximum acetaminophen dose from all sources = 75 mg/kg/day not to exceed 4 grams/day.      1615 (650 mg)-Given                amLODIPine (NORVASC) tablet 5 mg  Dose: 5 mg Freq: DAILY Route: PO  Start: 01/12/18 1300    (1320)-Not Given [C]        0833 (5 mg)-Given        0854 (5 mg)-Given        0841 (5 mg)-Given        0901 (5 mg)-Given        0911 (5 mg)-Given        0927 (5 mg)-Given           aspirin chewable tablet 81 mg  Dose: 81 mg Freq: DAILY Route: PO  Start: 01/10/18 1530    0752 (81 mg)-Given        0833 (81 mg)-Given        0854 (81 mg)-Given        0841 (81 mg)-Given        0900 (81 mg)-Given        0912 (81 mg)-Given        0926 (81 mg)-Given           calcium carbonate (TUMS) chewable tablet 500 mg  Dose: 500 mg Freq: DAILY PRN Route: PO  PRN Reason: heartburn  Start: 01/15/18 1650       1707 (500 mg)-Given              cetirizine (zyrTEC) tablet 10 mg  Dose: 10 mg Freq: 2 TIMES DAILY Route: PO  Start: 01/11/18 1030    0751 (10 mg)-Given       2049 (10 mg)-Given        0834 (10 mg)-Given       1958 (10 mg)-Given        0854 (10 mg)-Given       2050 (10 mg)-Given        0841 (10 mg)-Given       1940 (10 mg)-Given        0901 (10 mg)-Given       1909 (10 mg)-Given        0911 (10 mg)-Given       1934 (10 mg)-Given        0926 (10 mg)-Given       [ ] 2000           cholecalciferol (vitamin D3) tablet 2,000 Units  Dose: 2,000 Units Freq: DAILY Route: PO  Start: 01/14/18 0800      0854 (2,000  Units)-Given        0841 (2,000 Units)-Given        1100 (2,000 Units)-Given        0911 (2,000 Units)-Given        0927 (2,000 Units)-Given           docusate sodium (COLACE) capsule 200 mg  Dose: 200 mg Freq: 2 TIMES DAILY Route: PO  Start: 01/17/18 1000   Admin Instructions: Hold for loose stools          1205 (200 mg)-Given       1934 (200 mg)-Given        0926 (200 mg)-Given       [ ] 2000           emollient (VANICREAM) cream  Freq: 4 TIMES DAILY Route: Top  Start: 01/17/18 1200   Admin Instructions: Apply to body surface area with rash          1206 ( )-Given       1533 ( )-Given       1932 ( )-Given        0929 ( )-Given       1338 ( )-Given       [ ] 1600       [ ] 2000           fluocinonide (LIDEX) 0.05 % ointment  Freq: 2 TIMES DAILY Route: Top  Start: 01/15/18 2000   Admin Instructions: Apply to affected rash areas. See wound care order.        1915 ( )-Given        0901 ( )-Given       1902 ( )-Given        0912 ( )-Given       1922 ( )-Given        0929 ( )-Given       [ ] 2000           glucose 40 % gel 15-30 g  Dose: 15-30 g Freq: EVERY 15 MIN PRN Route: PO  PRN Reason: low blood sugar  Start: 01/08/18 0342   Admin Instructions: Give 15 g for BG 51 to 69 mg/dL IF patient is conscious and able to swallow. Give 30 g for BG less than or equal to 50 mg/dL IF patient is conscious and able to swallow. Do NOT give glucose gel via enteral tube.  IF patient has enteral tube: give apple juice 120 mL (4 oz or 15 g of CHO) via enteral tube for BG 51 to 69 mg/dL.  Give apple juice 240 mL (8 oz or 30 g of CHO) via enteral tube for BG less than or equal to 50 mg/dL.    ~Oral gel is preferable for conscious and able to swallow patient.   ~IF gel unavailable or patient refuses may provide apple juice 120 mL (4 oz or 15 g of CHO). Document juice on I and O flowsheet.              Or  dextrose 50 % injection 25-50 mL  Dose: 25-50 mL Freq: EVERY 15 MIN PRN Route: IV  PRN Reason: low blood sugar  Start: 01/08/18 0342    Admin Instructions: Use if have IV access, BG less than 70 mg/dL and meet dose criteria below:  Dose if conscious and alert (or disorientated) and NPO = 25 mL  Dose if unconscious / not alert = 50 mL  Vesicant. For ordered doses up to 25 mg, give IV Push undiluted. Give each 5g over 1 minute.              Or  glucagon injection 1 mg  Dose: 1 mg Freq: EVERY 15 MIN PRN Route: SC  PRN Reason: low blood sugar  PRN Comment: May repeat x 1 only  Start: 01/08/18 0342   Admin Instructions: May give SQ or IM. ONLY use glucagon IF patient has NO IV access AND is UNABLE to swallow AND blood glucose is LESS than or EQUAL to 50 mg/dL.  Give IV Push over 1 minute. Reconstitute with 1mL sterile water.               hydrALAZINE (APRESOLINE) injection 10-20 mg  Dose: 10-20 mg Freq: EVERY 1 HOUR PRN Route: IV  PRN Reason: high blood pressure  Start: 01/08/18 0437   Admin Instructions: FOR systolic blood pressure greater than  140 mmHg. May use if heart rate 60 bpm OR LESS. Note for nurse: If both labetalol (NORMODYNE/TRANDATE) and hydrALAZINE (APRESOLINE)are ordered, use labetalol (NORMODYNE/TRANDATE) first, UNLESS heart rate is less than 60 bpm.  For ordered doses up to 40 mg, give IV Push undiluted over 1 minute.     0751 (10 mg)-Given        1335 (20 mg)-Given           1557 (10 mg)-Given            HYDROmorphone (PF) (DILAUDID) injection 1 mg  Dose: 1 mg Freq: ONCE Route: IV  Start: 01/10/18 0845   Admin Instructions: For ordered doses up to 4 mg give IV Push undiluted. Administer each 2mg over 2-5 minutes.               imiquimod (ALDARA) 5 % cream 0.25 g  Dose: 1 packet Freq: Once per day on Mon Wed Fri Route: Top  Start: 01/08/18 0915   Admin Instructions: Apply to right jaw     0804 (0.25 g)-Given          0842 (0.25 g)-Given         0913 (0.25 g)-Given            insulin aspart (NovoLOG) inj (RAPID ACTING)  Freq: 3 TIMES DAILY WITH MEALS Route: SC  Start: 01/18/18 0815   Admin Instructions: DOSE:  1 units per 15 grams of  carbohydrate.  Only chart total amount of units given.  Do not give if pre-prandial glucose is less than 60 mg/dL.  If given at mealtime, must be administered 5 min before meal or immediately after.           1006 (3 Units)-Given       (1338)-Not Given [C]       [ ] 1800           insulin aspart (NovoLOG) inj (RAPID ACTING)  Dose: 1-6 Units Freq: 3 TIMES DAILY BEFORE MEALS Route: SC  Start: 01/12/18 0815   Admin Instructions: Correction Scale - custom DOSING     Do Not give Correction Insulin if Pre-Meal BG less than 140     ISF 40  1 per 40 >/= 140.  -179 give 1 unit.  -219 give 2 units.  -259 give 3 units.  -299 give 4 units.  -339 give 5 units.  BG >/= 340 give 6 units.  If given at mealtime, must be administered 5 min before meal or immediately after.     (0810)-Not Given [C]       (1304)-Not Given [C]       (1720)-Not Given [C]        (0835)-Not Given       1257 (4 Units)-Given [C]       1743 (1 Units)-Given [C]        0855 (1 Units)-Given       1321 (6 Units)-Given       1701 (5 Units)-Given        0840 (1 Units)-Given [C]       1159 (4 Units)-Given       1756 (3 Units)-Given [C]        0902 (2 Units)-Given [C]       1200 (6 Units)-Given [C]       1705 (2 Units)-Given [C]        0914 (1 Units)-Given       1206 (3 Units)-Given       1736 (1 Units)-Given [C]        1007 (1 Units)-Given       1336 (4 Units)-Given       [ ] 1700           insulin aspart (NovoLOG) inj (RAPID ACTING)  Dose: 1-5 Units Freq: AT BEDTIME Route: SC  Start: 01/12/18 2200   Admin Instructions: Correction Scale - custom DOSING     Do Not give Bedtime Correction Insulin if BG less than 200    -239 give 1 unit.  -279 give 2 units.  -319 give 3 units.  -359 give 4 units.  BG >/=360 give  5 units  If given at mealtime, must be administered 5 min before meal or immediately after.     (2242)-Not Given [C]        (2200)-Not Given        2253 (2 Units)-Given [C]        2156 (2 Units)-Given  [C]        2119 (1 Units)-Given [C]        2135 (2 Units)-Given [C]        [ ] 2200           insulin aspart (NovoLOG) inj (RAPID ACTING)  Freq: WITH SNACKS OR SUPPLEMENTS Route: SC  PRN Reason: high blood sugar  Start: 01/09/18 1504   Admin Instructions: DOSE:  1 units per 15 grams of carbohydrate. Only chart total amount of units given.  Do not give if pre-prandial glucose is less than 60 mg/dL.  If given at mealtime, must be administered 5 min before meal or immediately after.       1447 (3 Units)-Given [C]       2053 (3 Units)-Given [C]        1423 (2 Units)-Given [C]              liraglutide (VICTOZA) injection 0.6 mg  Dose: 0.6 mg Freq: DAILY Route: SC  Start: 01/18/18 0800          0838 (0.6 mg)-Given           multivitamin, therapeutic with minerals (THERA-VIT-M) tablet 1 tablet  Dose: 1 tablet Freq: DAILY Route: PO  Start: 01/13/18 1300     1335 (1 tablet)-Given        0854 (1 tablet)-Given        0841 (1 tablet)-Given        0900 (1 tablet)-Given        0911 (1 tablet)-Given        0926 (1 tablet)-Given           mupirocin (BACTROBAN) 2 % cream  Freq: 2 TIMES DAILY Route: Top  Start: 01/08/18 0915    0752 ( )-Given       2049 ( )-Given        0854 ( )-Given       1959 ( )-Given        0927 ( )-Given       2052 ( )-Given        0930 ( )-Given       1940 ( )-Given        0911 ( )-Given       1909 ( )-Given        0915 ( )-Given       1937 ( )-Given        0929 ( )-Given       [ ] 2000           naloxone (NARCAN) injection 0.1-0.4 mg  Dose: 0.1-0.4 mg Freq: EVERY 2 MIN PRN Route: IV  PRN Reason: opioid reversal  Start: 01/08/18 0437   Admin Instructions: For respiratory rate LESS than or EQUAL to 8.  Partial reversal dose:  0.1 mg titrated q 2 minutes for Analgesia Side Effects Monitoring Sedation Level of 3 (frequently drowsy, arousable, drifts to sleep during conversation).Full reversal dose:  0.4 mg bolus for Analgesia Side Effects Monitoring Sedation Level of 4 (somnolent, minimal or no response to  stimulation).  For ordered doses up to 2mg give IVP. Give each 0.4mg over 15 seconds in emergency situations. For non-emergent situations further dilute in 9mL of NS to facilitate titration of response.               ondansetron (ZOFRAN) injection 4 mg  Dose: 4 mg Freq: EVERY 6 HOURS PRN Route: IV  PRN Reasons: nausea,vomiting  Start: 01/10/18 1940   Admin Instructions: Irritant. For ordered doses up to 4 mg, give IV Push undiluted over 2-5 minutes.               pravastatin (PRAVACHOL) tablet 20 mg  Dose: 20 mg Freq: EVERY EVENING Route: ORAL OR FEED  Start: 01/08/18 2000 2049 (20 mg)-Given        1958 (20 mg)-Given        2050 (20 mg)-Given        1940 (20 mg)-Given        1909 (20 mg)-Given        1934 (20 mg)-Given        [ ] 2000           sennosides (SENOKOT) tablet 8.6 mg  Dose: 8.6 mg Freq: 2 TIMES DAILY PRN Route: PO  PRN Reasons: constipation,no stool  Start: 01/17/18 0954              sodium chloride (OCEAN) 0.65 % nasal spray 1 spray  Dose: 1 spray Freq: EVERY 1 HOUR PRN Route: BOTH NOSTRIL  PRN Reason: congestion  Start: 01/11/18 0128     0832 (1 spray)-Given        0936 (1 spray)-Given               sodium hypochlorite (DAKINS 0.5%) external solution  Freq: 2 TIMES DAILY Route: Top  Start: 01/15/18 2000   Admin Instructions: Use to affected areas as described in wound care orders.        1915 ( )-Given        1100 ( )-Given       1902 ( )-Given        1206 ( )-Given       1922 ( )-Given        0929 ( )-Given       [ ] 2000           tamsulosin (FLOMAX) capsule 0.4 mg  Dose: 0.4 mg Freq: DAILY Route: ORAL OR FEED  Start: 01/08/18 0945   Admin Instructions: Please hold for SBP less than 105     0753 (0.4 mg)-Given        0833 (0.4 mg)-Given        0854 (0.4 mg)-Given        0841 (0.4 mg)-Given        0859 (0.4 mg)-Given        0911 (0.4 mg)-Given        0927 (0.4 mg)-Given           traZODone (DESYREL) tablet 50 mg  Dose: 50 mg Freq: AT BEDTIME Route: PO  Start: 01/13/18 9640     4455 (50  mg)-Given        2100 (50 mg)-Given        2156 (50 mg)-Given        2119 (50 mg)-Given        2138 (50 mg)-Given        [ ] 2200          Completed Medications  Medications 01/12/18 01/13/18 01/14/18 01/15/18 01/16/18 01/17/18 01/18/18         Dose: 14 Units Freq: EVERY MORNING Route: SC  Start: 01/18/18 0845   End: 01/18/18 0927          0927 (14 Units)-Given          Discontinued Medications  Medications 01/12/18 01/13/18 01/14/18 01/15/18 01/16/18 01/17/18 01/18/18         Freq: 3 TIMES DAILY Route: [facility]  Start: 01/10/18 1400   End: 01/17/18 0841   Admin Instructions: Apply to body surface area with rash     0753 ( )-Given       1305 ( )-Given       2049 ( )-Given        0854 ( )-Given       1336 ( )-Given       1959 ( )-Given        0927 ( )-Given       1322 ( )-Given       2052 ( )-Given        0929 ( )-Given       1302 ( )-Given       1915 ( )-Given        0902 ( )-Given       1401 ( )-Given       1909 ( )-Given        0841-Med Discontinued  (0916)-Not Given              Freq: 3 TIMES DAILY WITH MEALS Route: SC  Start: 01/17/18 0815   End: 01/18/18 0807   Admin Instructions: DOSE:  1 units per 12 grams of carbohydrate.  Only chart total amount of units given.  Do not give if pre-prandial glucose is less than 60 mg/dL.  If given at mealtime, must be administered 5 min before meal or immediately after.          0919 (2 Units)-Given       (1336)-Not Given       1813 (10 Units)-Given [C]        0807-Med Discontinued  (0810)-Not Given             Freq: 3 TIMES DAILY WITH MEALS Route: SC  Start: 01/16/18 0815   End: 01/17/18 0800   Admin Instructions: DOSE:  1 units per 10 grams of carbohydrate.  Only chart total amount of units given.  Do not give if pre-prandial glucose is less than 60 mg/dL.  If given at mealtime, must be administered 5 min before meal or immediately after.         1101 (4 Units)-Given       1413 (3 Units)-Given [C]       1742 (2 Units)-Given [C]        0800-Med Discontinued  (0915)-Not Given               Freq: 3 TIMES DAILY WITH MEALS Route: SC  Start: 01/12/18 0815   End: 01/16/18 0810   Admin Instructions: DOSE:  1 units per 15 grams of carbohydrate.  Only chart total amount of units given.  Do not give if pre-prandial glucose is less than 60 mg/dL.  If given at mealtime, must be administered 5 min before meal or immediately after.     0921 (2 Units)-Given       (1319)-Not Given [C]       (1923)-Not Given [C]               1307 (4 Units)-Given [C]       1743 (1 Units)-Given [C]        1046 (2 Units)-Given [C]       1321 (3 Units)-Given [C]       (2055)-Not Given        0935 (3 Units)-Given [C]       1256 (1 Units)-Given [C]       1817 (1 Units)-Given [C]        0810-Med Discontinued  (1112)-Not Given               Dose: 17 Units Freq: EVERY 24 HOURS Route: SC  Start: 01/15/18 0845   End: 01/17/18 1102       0839 (17 Units)-Given        0901 (17 Units)-Given        0914 (17 Units)-Given       1102-Med Discontinued          Dose: 5 mg Freq: DAILY Route: PO  Start: 01/16/18 1545   End: 01/17/18 1102        1711 (5 mg)-Given        0911 (5 mg)-Given       1102-Med Discontinued          Freq: 2 TIMES DAILY Route: Top  Start: 01/09/18 2000   End: 01/15/18 1758   Admin Instructions: Apply to areas of rash     0752 ( )-Given       2049 ( )-Given        0854 ( )-Given       1959 ( )-Given        0926 ( )-Given       2053 ( )-Given        0930 ( )-Given       1758-Med Discontinued       Medications 01/12/18 01/13/18 01/14/18 01/15/18 01/16/18 01/17/18 01/18/18

## 2018-01-08 NOTE — PROGRESS NOTES
01/08/18 1000   General Information   Onset Date 01/08/18   Start of Care Date 01/08/18   Referring Physician Bradley Patiño MD   Patient Profile Review/OT: Additional Occupational Profile Info See Profile for full history and prior level of function   Patient/Family Goals Statement patient was not able to state a goal due to confusion   Swallowing Evaluation Bedside swallow evaluation   Behaviorial Observations Confused   Mode of current nutrition NPO   Comments Christopher Cuevas is a 89 year old male with h/o HTN, CAD s/p CABG, and dementia who was transferred from [facility] for ICH on CT.  He presented to outside hospital after a fall during which he struck his head.  He was found to have blood glucose to 565 in the field.  He is a poor historian so his daughters helped provide the history.  They report that he has seemed more tired the past few weeks, beginning on [place].  About five days ago it was noted that he seemed to have weakness of the right upper extremity.  Two days later he began to have eccentric movements of his right upper and lower extremities.  They note that he was previously fairly independent, and he lives at an Assisted Living Facility. Bedside swallow eval completed at 8:00 AM on 1/8/18.   Clinical Swallow Evaluation   Oral Musculature generally intact;unable to assess due to poor participation/comprehension   Structural Abnormalities none present   Dentition present and adequate   Laryngeal Function (unable to follow command for these tasks)   Oral Musculature Comments generally intact   Clinical Swallow Eval: Thin Liquid Texture Trial   Mode of Presentation, Thin Liquids straw;fed by clinician;self-fed   Volume of Liquid or Food Presented 8 oz thin liquid, (4 oz H20, 4 oz milk)   Oral Phase of Swallow WFL  (held bolus x1, swallowed without cue)   Pharyngeal Phase of Swallow intact   Diagnostic Statement WFL with sips of thin liquid   Clinical Swallow Eval: Puree Solid Texture Trial    Mode of Presentation, Puree spoon;self-fed   Volume of Puree Presented 5 oz applesauce   Oral Phase, Puree WFL   Oral Residue, Puree other (see comments)  (none)   Pharyngeal Phase, Puree intact   Diagnostic Statement WFL with puree texture   Clinical Swallow Eval: Solid Food Texture Trial   Mode of Presentation, Solid self-fed   Volume of Solid Food Presented gamaliel crackers   Oral Phase, Solid other (see comments)  (extended mastication but adequate)   Oral Residue, Solid other (see comments)  (trace residue cleared without cues)   Pharyngeal Phase, Solid intact   Diagnostic Statement WFL with regular textures   Swallow Compensations   Swallow Compensations Alternate viscosity of consistencies;Reduce amounts;Pacing   Esophageal Phase of Swallow   Patient reports or presents with symptoms of esophageal dysphagia No   General Therapy Interventions   Planned Therapy Interventions Dysphagia Treatment   Dysphagia treatment Instruction of safe swallow strategies   Swallow Eval: Clinical Impressions   Skilled Criteria for Therapy Intervention Skilled criteria met.  Treatment indicated.   Functional Assessment Scale (FAS) 5   Treatment Diagnosis mild oropharyngeal dysphagia   Diet texture recommendations Regular diet;Thin liquids   Recommended Feeding/Eating Techniques alternate between small bites and sips of food/liquid;maintain upright posture during/after eating for 30 mins;small sips/bites;other (see comments)  (self selection of softer foods)   Demonstrates Need for Referral to Another Service physical therapy;occupational therapy;dietitian   Therapy Frequency 5 times/wk   Predicted Duration of Therapy Intervention (days/wks) 2 weeks   Anticipated Discharge Disposition inpatient rehabilitation facility   Risks and Benefits of Treatment have been explained. Yes   Patient, family and/or staff in agreement with Plan of Care Yes   Clinical Impression Comments Clinical dysphagia examination completed this AM per MD  order. The patient was confused but pleasant for this exam. He fed himself regular textures, puree applesauce, and thin liquids by straw with slow but adequate oral phase, no significant oral residue, and no direct signs/symptoms of aspiration.  Suspect the patient's swallowing function is at baseline. However, AMS may impact safety. Recommend a regular diet and thin liquids with supervision and staff assisting with self selection of softer items. SLP will f/u for safe swallowing.   Total Evaluation Time   Total Evaluation Time (Minutes) 19

## 2018-01-08 NOTE — IP AVS SNAPSHOT
` ` Patient Information     Patient Name Sex     Christopher Cuevas ([id number]) Male 1928       Room Bed    7208 7208-01      Patient Demographics     Address Phone    [address] [facility] [address] [phone] (Home)      Patient Ethnicity & Race     Ethnic Group Patient Race    American White      Emergency Contact(s)     Name Relation Home Work Mobile    WYATT GAUTAM Daughter [phone]      JORDAN PARSONS Daughter [phone]      Documents on File        Status Date Received Description       Documents for the Patient    Privacy Notice - [place] Received 13     Insurance Card       External Medication Information Consent       Patient ID Scan Refused 18     Consent for Services - Hospital/Clinic Received () 13     Consent for Services - Hospital/Clinic  () 13 CONSENT FOR SERVICES, [place], 2013    Privacy Notice - [place]  13 ACKNOWLEDGMENT OF RECEIPT OF NOTICE OF PRIVACY PRACTICE, 2013    Consent for EHR Access  13 Copied from existing Consent for services - C/HOD collected on 2013    [facility] Specified Other       Consent for Services/Privacy Notice - Hospital/Clinic Received 18     Care Everywhere Prospective Auth Received 18     Consent for Services - [facility]          Documents for the Encounter    CMS IM for Patient Signature Received 01/10/18     Photograph       Photograph       Photograph       Photograph         Admission Information     Attending Provider Admitting Provider Admission Type Admission Date/Time    Minor Gonsalez MD Bentho, Gabrielle Monk MD Urgent 18  0142    Discharge Date Hospital Service Auth/Cert Status Service Area     Neuro ICU Incomplete [facility]    Unit Room/Bed Admission Status       UU U7A 7208/7208- Admission (Confirmed)       Admission     Complaint    Brain Hemorrhage , Fell today at NH , ICH (intracerebral hemorrhage) (H)      Hospital  Account     Name Acct ID Class Status Primary Coverage    Christopher Cuevas [id number] Inpatient Open [employer] - [employer]            Guarantor Account (for Hospital Account #[id number])     Name Relation to Pt Service Area Active? Acct Type    Christopher Cuevas Self FCS Yes Personal/Family    Address Phone          [address] [place] [address] [phone](H)              Coverage Information (for Hospital Account #[id number])     F/O Payor/Plan Precert #    UCARE/[employer] FOR S[employer]     Subscriber Subscriber #    Christopher Cuevas [id number]    Address Phone    [address]  [place] [id number] [phone]

## 2018-01-08 NOTE — CONSULTS
[facility]    NEUROSURGERY CONSULTATION NOTE    This consultation was requested by Dr. Jerome from the Neurocritical Care service.    Reason for Consultation: Concern for possible hemorrhage into left putamen    HPI: Patient unable to participate in the history taking - obtained from daughters and EMR.   Christopher Cuevas is an 89 year old male with a history of unspecified dementia, recently uncontrolled Type II Diabetes, hypertension, hyperlipidemia and CAD s/p CABG (on ASA), who presented via ambulance from his assisted living facility to an OSH ED at the behest of his daughters for a progressively increasing confusion over the past few weeks and report of intermittent right arm and leg movements. His daughters note that he had a fall today at his assisted living facility but they were not witness to it. He is unable to corroborate this story.     A CT scan was performed at the OSH ED and a hyperdensity was found in the left putamen. Concerning for possible hemorrhage but the presence of calcification could not be ruled out. Neurosurgery was consulted.     PAST MEDICAL HISTORY:   Past Medical History:   Diagnosis Date     Chronic kidney disease      Dementia      Dementia      Diabetes (H)      Gastroesophageal reflux disease      Hyperkalemia      Hyperlipidemia      Hypertension      PAST SURGICAL HISTORY:   Cataract surgery  Hernia repair  Coronary artery bypass and graft.     FAMILY HISTORY:   Mother - MI, and stroke  Father - prostate cancer    SOCIAL HISTORY:   Social History   Substance Use Topics     Smoking status: Former Smoker     Smokeless tobacco: Not on file      Comment: quit 45 years ago     Alcohol use Not on file   Lives in assisted living facility.     MEDICATIONS:  ASA 81mg  HCTZ  Lisinopril  Pravastatin  Tamsulosin  Trazadone    Allergies:  No Known Allergies    ROS: Unable to complete given patients neurologic status.     PE:  Temperature 97.6  F  (36.4  C), temperature source Axillary, SpO2 97 %.    NEUROLOGIC:  -- Awake; Alert; oriented x 2. Delirious. Attempting to get out of bed.   -- Follows commands intermittently. Difficult to get to focus.   -- Mildly dysarthric speech  -- No apparent hemineglect.  Cranial Nerves:  --  PERRL 3-2mm bilat and brisk, extraocular movements intact  -- face symmetrical, tongue midline  -- sensory V1-V3 intact bilaterally  -- palate elevates symmetrically, uvula midline  -- Poor hearing present in both ears.     Motor:   Delt Bi Tri FE IP Quad Hamst TibAnt EHL Gastroc    C5 C6 C7 C8/T1 L2 L3 L4-S1 L4 L5 S1   R 5 5 5 5 5 5 5 5 5 5   L 5 5 5 5 5 5 5 5 5 5     Sensory:   intact to LT  Gait: Unable to test.       ASSESSMENT: Christopher Cuevas is an 89 year old male with a newly found left putaminal hyperdensity concerning for calcification or possible hemorrhage.     RECOMMENDATIONS: To be formally staffed in AM.   - No neurosurgical intervention indicated at this time   - Requires ICU level observation at this time  - Repeat head CT in 5-6 hours from the time of first acquisition  - Avoid sedating medications that would alter a neurological examination    The patient was discussed with the neurosurgery chief resident, who agrees with the above outlined plan.    Contact the neurosurgery resident on call with questions.    Dial * * *[phone]: Enter [phone] when prompted.   Ray Nunes MD, PhD  Neurosurgery PGY-3

## 2018-01-08 NOTE — ED NOTES
Bed: ED03  Expected date:   Expected time:   Means of arrival:   Comments:  Ambulance  Fall  Elevated glucose

## 2018-01-08 NOTE — IP AVS SNAPSHOT
"` `           [address] [facility]: [phone]                                              INTERAGENCY TRANSFER FORM - NURSING   2018                    Hospital Admission Date: 2018  ILANA RUANO   : 1928  Sex: Male        Attending Provider: Minor Gonsalez MD     Allergies:  No Known Allergies    Infection:  None   Service:  NEURO ICU    Ht:  1.727 m (5' 8\")   Wt:  75.8 kg (167 lb 1.6 oz)   Admission Wt:  72.9 kg (160 lb 11.5 oz)    BMI:  25.41 kg/m 2   BSA:  1.91 m 2            Patient PCP Information     Provider PCP Type    Juan Gongora MD General      Current Code Status     Date Active Code Status Order ID Comments User Context       2018  4:39 AM DNR/DNI [id number]  Bradley Patiño MD Inpatient       Code Status History     Date Active Date Inactive Code Status Order ID Comments User Context    This patient has a current code status but no historical code status.      Advance Directives        Does patient have a scanned Advance Directive/ACP document in EPIC?           No        Hospital Problems as of 2018              Priority Class Noted POA    ICH (intracerebral hemorrhage) (H) Medium  2018 Yes      Non-Hospital Problems as of 2018              Priority Class Noted    Neoplasm of uncertain behavior of skin Medium  2013    Basal cell carcinoma, scalp/neck Medium  2013    SCC (squamous cell carcinoma), face Medium  2013      Immunizations     None         END      ASSESSMENT     Discharge Profile Flowsheet     EXPECTED DISCHARGE     Inspection under devices  Full 18 1004    Expected Discharge Date  -- (TCU) 18 1031   Skin WDL  ex 18 1004    DISCHARGE NEEDS ASSESSMENT     Skin Color/Characteristics  bruised (ecchymotic);pale;redness blanchable 18 1004    Equipment Currently Used at Home  walker, rolling 18 1423   Skin Temperature  warm 18 1004    GASTROINTESTINAL (ADULT,PEDIATRIC,OB)     Skin Moisture  " "flaky;dry 01/18/18 1004    GI WDL  WDL 01/18/18 1004   Skin Elasticity  slow return to original state 01/18/18 0104    All Quadrants Bowel Sounds  audible and active in all quadrants 01/18/18 1004   Skin Integrity  bruise(s);cracked;rash(es);scab(s) 01/18/18 1004    Last Bowel Movement  01/18/18 01/18/18 1004   Full except areas not inspected   Buttock, left;Buttock, right;Sacrum;Coccyx 01/17/18 0146    GI Signs/Symptoms  passing flatus 01/17/18 1702   Additional Documentation  Rash (LDA) 01/13/18 2048    Passing flatus  yes 01/18/18 1004   SAFETY      COMMUNICATION ASSESSMENT     Safety WDL  WDL 01/18/18 1004    Patient's communication style  spoken language (English or Bilingual) 01/07/18 2041   Safety Factors  bed in low position;wheels locked;call light in reach;upper side rails raised x 2;ID band on 01/18/18 1004    SKIN     Safety Equipment  oxygen flowmeter 01/18/18 0104    Inspection of bony prominences  Full 01/18/18 1004   All Alarms  alarm(s) activated and audible 01/18/18 1004                 Assessment WDL (Within Defined Limits) Definitions           Safety WDL     Effective: 09/28/15    Row Information: <b>WDL Definition:</b> Bed in low position, wheels locked; call light in reach; upper side rails up x 2; ID band on<br> <font color=\"gray\"><i>Item=AS safety wdl>>List=AS safety wdl>>Version=F14</i></font>      Skin WDL     Effective: 09/28/15    Row Information: <b>WDL Definition:</b> Warm; dry; intact; elastic; without discoloration; pressure points without redness<br> <font color=\"gray\"><i>Item=AS skin wdl>>List=AS skin wdl>>Version=F14</i></font>      Vitals     Vital Signs Flowsheet     COMMENTS     Side Effects Monitoring: Sedation Level  1 01/16/18 1014    Comments  Admit to 4A from [facility] ED 01/08/18 0150   HEIGHT AND WEIGHT      VITAL SIGNS     Height  1.727 m (5' 8\") 01/08/18 0233    Temp  97.5  F (36.4  C) 01/18/18 1244   Height Method  Stated 01/08/18 0233    Temp src  Oral 01/18/18 1244   " "Weight  75.8 kg (167 lb 1.6 oz) 01/18/18 0935    Resp  16 01/18/18 1244   Weight Method  Standing scale 01/13/18 0104    Pulse  79 01/17/18 2002   BSA (Calculated - sq m)  1.87 01/08/18 0233    Heart Rate  86 01/18/18 1244   BMI (Calculated)  24.49 01/08/18 0233    Pulse/Heart Rate Source  Monitor 01/18/18 1244   POSITIONING      BP  108/69 01/18/18 1244   Body Position  independently positioning 01/18/18 1004    BP Location  Left arm 01/18/18 1244   Head of Bed (HOB)  HOB lowered 01/18/18 0104    OXYGEN THERAPY     Positioning/Transfer Devices  pillows;in use 01/18/18 0104    SpO2  100 % 01/18/18 1244   Chair  Upright in chair 01/17/18 1702    O2 Device  None (Room air) 01/18/18 1244   DAILY CARE      PAIN/COMFORT     Activity Management  activity adjusted per tolerance;activity encouraged 01/18/18 1004    Patient Currently in Pain  denies 01/18/18 0418   Activity Assistance Provided  assistance, 1 person 01/18/18 1004    Preferred Pain Scale  CAPA (Clinically Aligned Pain Assessment) ([facility] Adults Only) 01/18/18 0418   Assistive Device Utilized  walker;gait belt 01/18/18 1004    Pain Location  Chest 01/15/18 1647   ECG      Pain Descriptors  Burning;Other (comment) (\"Heartburn\") 01/15/18 1647   ECG Rhythm  Sinus rhythm (bundle branch block) 01/08/18 0740    Pain Intervention(s)  MD notified (Comment) 01/15/18 1647   Ectopy  None 01/08/18 0740    Response to Interventions  Decrease in pain 01/15/18 2104   Lead Monitored  Lead II;V 1 01/08/18 0740    CLINICALLY ALIGNED PAIN ASSESSMENT (CAPA) ([facility] ADULTS ONLY)     JAVON COMA SCALE      Comfort  negligible pain 01/18/18 0418   Best Eye Response  4-->(E4) spontaneous 01/18/18 0104    Change in Pain  getting better 01/15/18 2104   Best Motor Response  6-->(M6) obeys commands 01/18/18 0104    Pain Control  partially effective 01/15/18 2104   Best Verbal Response  4-->(V4) confused 01/18/18 0104    Functioning  can do most " things, but pain gets in the way of some 01/15/18 2104   [place] Coma Scale Score  14 01/18/18 0104    Sleep  awake with occasional pain 01/15/18 2104   Assessment Qualifiers  patient not sedated/intubated 01/13/18 1648    ANALGESIA SIDE EFFECTS MONITORING     POINT OF CARE TESTING      Side Effects Monitoring: Respiratory Quality  R 01/16/18 1014   Puncture Site  fingertip 01/16/18 0153    Side Effects Monitoring: Respiratory Depth  N 01/16/18 1014   Bedside Glucose (mg/dl )   226 mg/dl 01/16/18 0153            Patient Lines/Drains/Airways Status    Active LINES/DRAINS/AIRWAYS     Name: Placement date: Placement time: Site: Days: Last dressing change:    Peripheral IV 01/10/18 Left Lower forearm 01/10/18   0122   Lower forearm   8     Wound 01/18/18 Lateral;Right Ankle Suspected pressure ulcer Scabbed area surrounded by erythema on R lateral ankle 01/18/18   1032   Ankle   less than 1     Rash Right lateral leg                     Patient Lines/Drains/Airways Status    Active PICC/CVC     None            Intake/Output Detail Report     Date Intake     Output Net    Shift P.O. I.V. IV Piggyback Total Urine Total       Day 01/17/18 0000 - 01/17/18 0659 -- -- -- -- 200 200 -200    Keysha 01/17/18 0700 - 01/17/18 1459 240 -- -- 240 300 300 -60    Noc 01/17/18 1500 - 01/17/18 2359 240 -- -- 240 250 250 -10    Day 01/18/18 0000 - 01/18/18 0659 -- -- -- -- 200 200 -200    Keysha 01/18/18 0700 - 01/18/18 1459 240 -- -- 240 -- -- 240      Last Void/BM       Most Recent Value    Urine Occurrence 1 at 01/18/2018 0930    Stool Occurrence 1 at 01/18/2018 0930      Case Management/Discharge Planning     Case Management/Discharge Planning Flowsheet     LIVING ENVIRONMENT     MH/BH CAREGIVER      Lives With  spouse 01/11/18 1423   Filed Complexity Screen Score  4 01/08/18 1215    Living Arrangements  assisted living 01/11/18 1423   ABUSE RISK SCREEN      COPING/STRESS     QUESTION TO PATIENT:  Has a member of your family or a  partner(now or in the past) intimidated, hurt, manipulated, or controlled you in any way?  patient declined to answer or is unable to answer 01/10/18 1201    Major Change/Loss/Stressor  unable to assess 01/08/18 7434   QUESTION TO PATIENT: Do you feel safe going back to the place where you are living?  patient declined to answer or is unable to answer 01/10/18 1201    EXPECTED DISCHARGE     OBSERVATION: Is there reason to believe there has been maltreatment of a vulnerable adult (ie. Physical/Sexual/Emotional abuse, self neglect, lack of adequate food, shelter, medical care, or financial exploitation)?  no 01/10/18 1201    Expected Discharge Date  -- (TCU) 01/11/18 1031   (R) MENTAL HEALTH SUICIDE RISK      FINAL RESOURCES     Are you depressed or being treated for depression?  No 01/10/18 1201    Equipment Currently Used at Home  walker, rolling 01/11/18 0613

## 2018-01-08 NOTE — PLAN OF CARE
Problem: Patient Care Overview  Goal: Plan of Care/Patient Progress Review  OT/4AB:  OT orders received and acknowledged.  Per discussion with PT, pt not appropriate for OT initiation this date due to delirium affecting pt's ability to participate in meaningful intervention.  Will reschedule.

## 2018-01-08 NOTE — ED NOTES
Pt from NH. Pt had witnessed, mechanical fall (tripped) earlier today and did hit the back of his head. Pt has bandaid covering area. No LOC. Pt with increased confusion/restlessness and agitation over past week--staff at NH reports hyperglycemia x1 week. Pt BG by EMS >500. Pt does have hx of dementia at baseline.

## 2018-01-08 NOTE — PROGRESS NOTES
Brief Neurosurgery Progress Note:  - No neurosurgical intervention  - Stable repeat CT head    Neurosurgery will sign-off at this time    The patient was discussed with Dr. Perez, neurosurgery staff and he agrees with the above.    Hans Valencia MD, PhD  Neurosurgery PGY-2      Please contact neurosurgery resident on call with questions.    Dial * * [phone], enter [phone] when prompted.

## 2018-01-08 NOTE — PLAN OF CARE
Problem: Patient Care Overview  Goal: Plan of Care/Patient Progress Review  [id number]  Pt arrived from  around 1600. Pt sleepy from ativan given pre MRI but agitated and impulsive when awake. Pt swung at staff when repositioning. Biting at mitts. nono on over IV line. Sitter at bedside. Pt has insulin gtt on algorithm 1- 1u/hr. Hourly blood sugars. 1/2NS @100ml/hr. Reg diet. Pt with enlarged prostate and trouble urinating. Bladder scan q4h and straight cath PRN.  Pt also has skin rash on R arm and shoulder- provider aware.

## 2018-01-08 NOTE — PROGRESS NOTES
[facility] [place]    Internal Medicine ICU Transfer Acceptance  Note - Gold Service      Assessment & Plan   Christopher Cuevas is a 89 year old male admitted on 1/8/2018. He has PMH of Dementia, HTN, CAD s/p CABG, GERD, BPH and recent witnessed fall who was admitted to the NICU (1/7/17) from OSH with concern for ICH on CT. Patient admitted to NICU for further evaluation. CT images suggestive of calcification vs ICH vs related to hyperglycemia. Patient transferred out of the ICU and transferred to Medicine for further care.        # RUE choreoathetosis, witnessed fall, Concern for ICH in patient with Dementia:  Pt. Presented to OSH after suffering a mechanical fall from standing (1/7/17) with reported head trauma and CT with concern for ICH. Family noted RU and possible RLE uncontrolled movement PTA. On admission to NICU CT with hyperdensity of the left putamen, diffuse cerebral volume loss and cerebral white matter changes consistent with chronic small vessel disease. Repeat CT with re demonstration of diffuse hyperdensity of the left putamen- likely remineralization, though f/u with MRI recommended. MRI attempted, but patient did not tolerate despite use of ativan.  Per discussion with Neurology, findings may be 2/2 hyperglycemia.  Unclear if patient will tolerate MRI- will need to discuss in am. Consider repeat CT if with acute changes. Upon exam, patient somnolent, though arouses to voice. He is agitated and pulls at lines. He moves UE and LE and responds to voice. No abnormal extremity movements noted on exam. Unable to assess orientation.   - Repeat BMP, check VBG  - Appreciate Neurology's assistance  - Please discuss further attempts at MRI   - Repeat head CT if with acute changes  - CBC, BMP in am   - Continue Neuro checks q4h for now   - Please keep HOB elevated   - Hold ASA per neurology recommendations  - Please keep SBP < 140    - PT/OT/SLP  - Fall precautions  - Good sleep  hygiene  - Delirium precautions  - D/c to care facility as soon as medically able given Dementia hx   - Bedside sitter for now    # Hyperglycemia, DMII     HHS:    A1c 14.1 on admission. Previously 6.9 in 7/2016. Admit with BG in mid 500's.  (AG 12-1/5/17). VBG 7.40/37/40/23. K 4.1 --> 3.6, Mg 2.1, Phos 3.8. BG low 200's on insulin gtt. S/p 2L NS and maintenance 0.45%. Ketones 1.1 (1/7/18) AG 14. Concern for new onset DM with possible paraneoplastic syndrome, though Ca down trending with IVF.   - Continue insulin gtt  - D5 0.45% NS @ 100cc/hr  - Endocrine consult      # Morbilliform rash: Per nursing, patient with erythematous rash on bilateral UE with slight extension to the back. Only new medication patient received on admission is Ativan.   - Closely monitor  - Would avoid any new meidcations   - Consider Dermatology consult     # Hypercalcemia: Ca on admission 10.9 --> 10.5--> 9.8.   - Continue IVF  - BMP in am     # Thrombocytopenia: Platelets 139 --> 111. Intermittently thrombocytopenic since at least 2013.   - Consider peripheral smear      # HTN, CAD s/p CABG: No hx on file.    PTA ASA, lisinopril, HCTZ.   - Hold BP medications tonight as   - If BP > 140 would give PRN hydralazine     # ANISHA on CKD: Cr on admission 3.30 BUN elevated to 70 BL appears  ~ 2.0. Cr down to 2.71 --> 2.25 with IVF. Suspect prerenal in setting of hypovolemia.   - Avoid nephrotoxic medications, hypotension, dehydration  - BMP in am     # BPH: PTA Flomax.   - Continue PTA medication  - Bladder scan and Straight cath for PVR > 350      Diet: Regular Diet Adult  Fluids:D5 0.45% NS @ 100 cc/hr  DVT Prophylaxis: Pneumatic Compression Devices  Code Status: DNR/DNI    Disposition Plan   Expected discharge: 2 - 3 days, recommended to prior living arrangement once Medically stable.     Entered: Aparna Babin 01/08/2018, 4:22 PM   Information in the above section will display in the discharge planner report.      The  "patient's care was discussed with the Attending Physician, Dr. Padilla.    Aparna Babin PA-C  Internal Medicine Hospitalist Service  [facility]  Pager: [phone]  Please see sticky note for cross cover information    Interval History    Attempt was made at MRI, though patient did not tolerate despite ativan prior. Upon examination, patient arouses to voice, but is noted to be pulling at IV lines and puling covers up when exam attempted. He says \"hi\" when writer introduced, but no additional meaningful conversation. He is unable to give any hx and is somnolent.        Data reviewed today: I reviewed all medications, new labs and imaging results over the last 24 hours. I personally reviewed recent imaging, daily labs, progress notes     Physical Exam   Vital Signs: Temp: 97.1  F (36.2  C) Temp src: Axillary BP: 104/58   Heart Rate: 79 Resp: 18 SpO2: 99 % O2 Device: None (Room air)    Weight: 160 lbs 11.45 oz    Physical Exam   Constitutional: Somnolent elderly male lying in bed. Arouses to voice, though no meaningful conversation and does not answer simple questions.  HEENT:   Head: Normocephalic and atraumatic.   Eyes: Conjunctivae are normal. Pupils are equal, round, and reactive to light.  Pharynx has no erythema or exudate, mucous membranes are moist  Neck:   No adenopathy, no bony tenderness  Cardiovascular: Regular rate and rhythm without murmurs or gallops  Pulmonary/Chest: Clear to auscultation bilaterally, with no wheezes or retractions. No respiratory distress.  GI: Soft with good bowel sounds.  Non-tender, non-distended, with no guarding, no rebound, no peritoneal signs.   Back:  No bony or CVA tenderness   Musculoskeletal:  No edema or clubbing   Skin: Skin is warm and dry. Erythematous morbilliform rash on bilateral UE.   Neurological: Arouses to voice. Moves bilateral UE and LE.   Psychiatric:  Mildly agitated.         Data   Data     Recent Labs  Lab 01/08/18  0934 " 01/07/18 2000   WBC 7.8 8.1   HGB 13.1* 13.5   MCV 92 88   * 139*    132*   POTASSIUM 3.6 4.1   CHLORIDE 105 96   CO2 23 22   BUN 64* 70*   CR 2.71* 3.30*   ANIONGAP 12 14   LIZ 10.5* 10.9*   * 472*   ALBUMIN  --  3.9   PROTTOTAL  --  7.4   BILITOTAL  --  0.7   ALKPHOS  --  74   ALT  --  21   AST  --  20

## 2018-01-08 NOTE — IP AVS SNAPSHOT
[address] [address]    Phone:  [phone]                                       After Visit Summary   1/8/2018    Christopher Cuevas    MRN: [id number]           After Visit Summary Signature Page     I have received my discharge instructions, and my questions have been answered. I have discussed any challenges I see with this plan with the nurse or doctor.    ..........................................................................................................................................  Patient/Patient Representative Signature      ..........................................................................................................................................  Patient Representative Print Name and Relationship to Patient    ..................................................               ................................................  Date                                            Time    ..........................................................................................................................................  Reviewed by Signature/Title    ...................................................              ..............................................  Date                                                            Time

## 2018-01-08 NOTE — IP AVS SNAPSHOT
` `           [facility]: [phone]                 INTERAGENCY TRANSFER FORM - NOTES (H&P, Discharge Summary, Consults, Procedures, Therapies)   2018                    Hospital Admission Date: 2018  ILANA RUANO   : 1928  Sex: Male        Patient PCP Information     Provider PCP Type    Juan Gongora MD General         History & Physicals      H&P by Bradley Patiño MD at 2018  3:35 AM     Author:  Bradley Patiño MD Service:  Neuro ICU Author Type:  Resident    Filed:  2018  8:46 AM Date of Service:  2018  3:35 AM Creation Time:  2018  3:13 AM    Status:  Attested :  Bradlye Patiño MD (Resident)    Cosigner:  Wallace Granger MD at 2018  2:23 PM        Attestation signed by Wallace Granger MD at 2018  2:23 PM        Attestation:  Physician Attestation   IWallace, personally examined and evaluated this patient.  I discussed the patient with the resident and care team, and agree with the assessment and plan of care as documented in the resident s note of 2018 [date].      I personally reviewed vital signs, medications, labs and imaging.    Key findings: Patient presenting with acute choreoathetosis, localized to the right side. hyperintensity on CT not likely an ICH. Will consider the possibility of this being related to a locus minoris activation with severe hyperglycemia. Planned Brain MRI. Hydration and insulin drip initiated.   Wallace Granger  Date of Service (when I saw the patient): 18                               [facility], [place]      Neurology Stroke Admission    Patient Name: Ilana Ruano  : 1928 MRN#: [id number]    STROKE DATA    Stroke Code:  Stroke code not activated.  Time patient seen:[MH1.1]  2018[MH1.2] 0230[MH1.3]  Last known normal (pt's baseline):[MH1.1]  2 weeks ago[MH1.3]       TPA treatment:[MH1.1]   Not given due to head CT consistent with bleed.[MH1.3]      Stroke Scales      National Institutes of Health Stroke Scale (at presentation)   NIHSS done at:  time patient seen      Score    Level of consciousness:  (1)   Not alert; arousable w/ minor stim to obey/answer/respond    LOC questions:  (2)   Answers neither question correctly    LOC commands:  (1)   Performs one task correctly    Best gaze:  (0)   Normal    Visual:  (0)   No visual loss    Facial palsy:  (0)   Normal symmetrical movements    Motor arm (left):  (0)   No drift    Motor arm (right):  (1)   Drift    Motor leg (left):  (0)   No drift    Motor leg (right):  (0)   No drift    Limb ataxia:  (1)   Present in one limb    Sensory:  (0)   Normal- no sensory loss    Best language:  (1)   Mild to moderate aphasia    Dysarthria:  (1)   Mild to moderate dysarthria    Extinction and inattention:  (0)   No abnormality        NIHSS Total Score:  8        ICH Score (within 6 hrs of admission/before surgery)   Done at: 0200[MH1.1] 01/08/18[MH1.4]    ICH Score Tool Patients Score   Age ? 80 years 1 point    GCS score  3-4   5-12   13-15    2 point  1 point  0 point    Hematoma volume, cm 3 ? 30 1 point    Intraventricular extension 1 point    Infratentorial location 1 point    Patient s ICH Score (0-6) = 1              Dysphagia Screen  Time of screening:[MH1.1] 01/08/2018[MH1.4] 0200  Screening results: Failed screening - strict NPO pending SLP evaluation     ASSESSMENT & PLAN BY PROBLEM     Work-up for Intracerebral Hemorrhage[MH1.1]  His initial head CT was suggestive of ICH in left putamen.  Follow up head CT has showed stability of the hyperdensity and it is likely that it represents calcification.  We will continue to treat as a bleed until we can further characterize the lesion with MRI.[MH1.3]    Acute Hemorrhagic Stroke Plan  - ICH Score: 1  - Admit to Neuro ICU  - Neurochecks  - Systolic BP Goal: < 140  - Euthermia, Euglycemia  - Head of bed  elevated  - Telemetry, EKG  - Imaging: CT, CT angio head/neck  - Bedside Glucose Monitoring  - A1c, Troponin x 3  - PT/OT/SLP  - PM&R  - Stroke Education[MH1.1]   - Holding aspirin[MH1.3]    During initial physical assessment, the plan of care[MH1.1] was discussed and developed with family.  Plan of care includes: work up for ICH[MH1.3].    Patient was admitted via[MH1.1] direct admit[MH1.3].    The patient[MH1.1] will be admitted to the Neuro Critical Care/Stroke team.[MH1.3].     Other Medical Problems:[MH1.1]  #ANISHA on CKD  Creatinine 3.3 yesterday at OSH.  Creatinine 2.18 in 2017.  We will avoid contrast.  BUN 70 suggests some of this may be prerenal.  Received 1 liter bolus at outside hospital.  Will given 100 ml/hr NS.    #Hyperglycemia  Glucose in 400s at outside hospital.  B-hydroxybutyrate elevated.  Started on insulin ggt.  Per daughters hemoglobin A1C was around 14 recently.    -Insulin ggt[MH1.3]      Fluids/Electrolytes/Nutrition  0.9% sodium chloride @[MH1.1] 100[MH1.3] mL/hr  Avoid hypotonic fluids.    Nutrition:[MH1.1] None[MH1.5]      Prophylaxis[MH1.1]            For VTE Prevention:  - pneumatic compression device[MH1.3]      Code Status[MH1.1]  DNR / DNI per conversation with daughters[MH1.3]    HPI  Christopher Cuevas is a 89 year old male with[MH1.1] h/o HTN, CAD s/p CABG, and dementia who was transferred from OSH for ICH on CT.  He presented to outside hospital after a fall during which he struck his head.  He was found to have blood glucose to 565 in the field.  He is a poor historian so his daughters helped provide the history.  They report that he has seemed more tired the past few weeks, beginning on Eduardo.  About five days ago it was noted that he seemed to have weakness of the right upper extremity.  Two days later he began to have eccentric movements of his right upper and lower extremities.  They note that he was previously fairly independent, and he lives at an Assisted Living  Facility.[MH1.3]     Pertinent Past Medical/Surgical History[MH1.1]  Past Medical History:   Diagnosis Date     Chronic kidney disease      Dementia      Dementia      Diabetes (H)      Gastroesophageal reflux disease      Hyperkalemia      Hyperlipidemia      Hypertension        No past surgical history on file.[MH1.5]    Medications:[MH1.1]   Prescriptions Prior to Admission   Medication Sig Dispense Refill Last Dose     imiquimod (ALDARA) 5 % cream Apply  topically three times a week.        mupirocin (BACTROBAN) 2 % ointment Use 2 times a day to affected area. 22 g 1[MH1.6]    .    Allergies:[MH1.1] No Known Allergies[MH1.6].    Family History:[MH1.1] No family history on file.[MH1.6].    Social History:[MH1.1]   Social History   Substance Use Topics     Smoking status: Former Smoker     Smokeless tobacco: Not on file      Comment: quit 45 years ago     Alcohol use Not on file[MH1.6]   .    ROS:[MH1.1]  Unable to perform due to mental status[MH1.3]    PHYSICAL EXAMINATION  Vital Signs:  B/P: 94/54,  T: 97.6,  P: Data Unavailable,  R: Data Unavailable    General:[MH1.1]  patient lying in bed without any acute distress[MH1.3]    HEENT:[MH1.1]  normocephalic/atraumatic, only saw[MH1.3]  Cardio:[MH1.1]  RRR[MH1.3]  Pulmonary:[MH1.1]  no respiratory distress[MH1.3]  Abdomen:[MH1.1]  bowel sounds present[MH1.3]  Extremities:[MH1.1]  no edema[MH1.3]  Skin:[MH1.1]  intact[MH1.3]     Neurologic  Mental Status:[MH1.1]  agitated, answers many questions inappropriately[MH1.3]  Cranial Nerves:[MH1.1]  visual fields intact, PERRL, EOMI with normal smooth pursuit, facial sensation intact and symmetric, facial movements symmetric, shoulder shrug strong bilaterally, tongue protrusion midline, hearing diminished[MH1.3]  Motor:[MH1.1] Strength 5/5 throughout with significant[MH1.3]   Reflexes:[MH1.1]  2+ and symmetric throughout[MH1.3]  Sensory:[MH1.1]  Intact to light touch bilaterally upper and lower[MH1.3]  Coordination:[MH1.1]   Unable participate[MH1.3]  Station/Gait:[MH1.1]  Unable to test[MH1.3]    Labs  Labs and Imaging reviewed and used in developing the plan; pertinent results included.[MH1.1]     Lab Results   Component Value Date     (H) 01/07/2018[MH1.5]       The patient was discussed with the fellow, [MH1.1] Jorge[MH1.3].  The staff is [MH1.1] Elkin[MH1.3].[MH1.1]    Bradley Patiño MD[MH1.5]  Pager:[MH1.1] [phone][MH1.3]         Revision History        User Key Date/Time User Provider Type Action    > MH1.6 1/8/2018  8:46 AM Bradley Patiño MD Resident Sign     MH1.2 1/8/2018  8:05 AM Bradley Patiño MD Resident      MH1.3 1/8/2018  8:03 AM Bradley Patiño MD Resident      MH1.4 1/8/2018  3:17 AM Bradley Patiño MD Resident      MH1.5 1/8/2018  3:14 AM Bradley Patiño MD Resident      MH1.1 1/8/2018  3:13 AM Bradley Patiño MD Resident                   Discharge Summaries     No notes of this type exist for this encounter.         Consult Notes      Consults by Shadi Quinn MD at 1/10/2018 11:43 AM     Author:  Shadi Quinn MD Service:  Dermatology Author Type:  Physician    Filed:  1/17/2018 10:11 AM Date of Service:  1/10/2018 11:43 AM Creation Time:  1/10/2018 11:39 AM    Status:  Signed :  Shadi Quinn MD (Physician)     Consult Orders:    1. Dermatology IP Consult: Patient to be seen: Routine - within 24 hours; Call back #: [phone]; concern for worsening morbiliform rash-prominent on back and upper extremities (right side); Consultant may enter orders: Yes [[id number]] ordered by Cinthia Mckeon MD at 01/10/18 1100                [facility] Inpatient Consult Dermatology Note    Impression/Plan:[NK1.1]  1. Eczematous-type rash- Morphologically appears to be 2/2 eczema craquele with possible component of nutritional deficiency. Does not appear to be due to a drug rash. He has not started on any big culprit medications.  He can still have drug rash from old medication, but again clinically does not appear to be drug rash. Rash is not morbiliform and looks more scaly like eczema. Relatively spares the left side of his body and mostly involves his extremities, which would be atypical for drug. Have elected not to do biopsy at this time, given classic appearance for eczema. Would treat with topical steroids and frequent emollients.  Start triamcinolone 0.1% ointment BID  Order emollient to use at least BID (such as vaseline/aquaphor/vanicream)  Optimize nutritional status  2. History of NMSC/?melanoma- Patient should have f/u with dermatology as outpatient. He has multiple crusted papules on the scalp, which can be seen with AKs vs SCC. Will need in-depth exam as outpatient.     F/u with dermatology as outpatient[NK1.2]    Thank you for the dermatology consultation. Please do not hesitate to contact the dermatology resident/faculty on call for any additional questions or concerns.     Kiara Zelaya[NK1.1], MD  Internal Medicine-Dermatology, PGY-2    Staffed with Dr. Quinn[NK1.2]    I have seen and examined this patient and agree with the assessment and plan as documented in the resident's note.    Shadi Quinn MD  Dermatology Attending[DM1.1]      Dermatology Problem List:  1.[NK1.1] Eczematous rash- likely eczema craquele with possible component of nutritional deficiency[NK1.2]    Date of Admission: Jan 8, 2018   Encounter Date: 1/10/2018     Reason for Consultation:   Rash    History of Present Illness:  Mr. Christopher Cuevas is a 89 year old male with PMH Dementia, HTN, CAD s/p CABG, who  who was admitted on 1/8/18 for fall and concern of ICH. Dermatology was consulted for rash that[NK1.1] was noticed[NK1.2]on the arms while in the ED on 1/7/18.[NK1.1]  History obtained from the patient's daughter due to patient's underlying dementia.  The patient's daughter states that the rash was first noticed a few days ago. Uncertain  to the extent of the rash but they see on the right upper extremity. The patient has not started any new medications, other than insulin he has received inpatient. The rash is not bothersome to the patient; non-burning, not pruritic and not painful.  He denies F/C/cough/recent infections/rhinorrhea. Daughter also states he has not had any recent infections.  Denies prior history of rash or eczema.  Daughter mentions he has had history of NMSC and possibly a melanoma that was removed many years ago.[NK1.2]    Past Medical History:   Patient Active Problem List   Diagnosis     Neoplasm of uncertain behavior of skin     Basal cell carcinoma, scalp/neck     SCC (squamous cell carcinoma), face     ICH (intracerebral hemorrhage) (H)     Past Medical History:   Diagnosis Date     Chronic kidney disease      Dementia      Dementia      Diabetes (H)      Gastroesophageal reflux disease      Hyperkalemia      Hyperlipidemia      Hypertension      No past surgical history on file.      Social History:  The patient[NK1.1] lives in assisted living facility with his wife[NK1.2]    Family History:[NK1.1]  Family history unknown.[NK1.2]     Medications:  Current Facility-Administered Medications   Medication     HYDROmorphone (PF) (DILAUDID) injection 1 mg     insulin glargine (LANTUS) injection 19 Units     insulin aspart (NovoLOG) inj (RAPID ACTING)     insulin aspart (NovoLOG) inj (RAPID ACTING)     Med Instruction - Transition from IV Insulin Infusion to Sub-Q Insulin     acetaminophen (TYLENOL) tablet 650 mg     0.9% sodium chloride infusion     triamcinolone (KENALOG) 0.1 % ointment     insulin aspart (NovoLOG) inj (RAPID ACTING)     insulin aspart (NovoLOG) inj (RAPID ACTING)     naloxone (NARCAN) injection 0.1-0.4 mg     hydrALAZINE (APRESOLINE) injection 10-20 mg     insulin 1 unit/mL in saline (NovoLIN, HumuLIN Regular) drip - ADULT IV Infusion     glucose 40 % gel 15-30 g    Or     dextrose 50 % injection 25-50 mL    Or  "    glucagon injection 1 mg     labetalol (NORMODYNE/TRANDATE) injection 20 mg     mupirocin (BACTROBAN) 2 % cream     imiquimod (ALDARA) 5 % cream 0.25 g     pravastatin (PRAVACHOL) tablet 20 mg     tamsulosin (FLOMAX) capsule 0.4 mg          No Known Allergies      Review of Systems:  -[NK1.1]As per HPI[NK1.2]      Physical exam:  Vitals: /58 (BP Location: Right arm)  Pulse 87  Temp 98  F (36.7  C) (Axillary)  Resp 18  Ht 1.727 m (5' 8\")  Wt 72.9 kg (160 lb 11.5 oz)  SpO2 97%  BMI 24.44 kg/m2  GEN:[NK1.1] NAD[NK1.2]  SKIN:[NK1.1] Total skin excluding the undergarment areas was performed. The exam included the head/face, neck, both arms, chest, back, abdomen, both legs, digits and/or nails.[NK1.2]   -[NK1.1]Lace-like scaly eczematous thin pink plaques on the lateral RLE. On the RUE he has scaly pink-red plaque with multiple linear excoriations and excoriated papules. Rash relatively spares the L side.  -Posterior R shoulder and upper back with thin eczematous scaly plaque with excoriated papules  -There are no vesicles  -L abdomen with faint pink scaly papules[NK1.2]  -[NK1.1]Crusted papules on the scalp[NK1.2]  -No other lesions of concern on areas examined.     Laboratory:  Results for orders placed or performed during the hospital encounter of 01/08/18 (from the past 24 hour(s))   Glucose by meter   Result Value Ref Range    Glucose 143 (H) 70 - 99 mg/dL   Glucose by meter   Result Value Ref Range    Glucose 205 (H) 70 - 99 mg/dL   Glucose by meter   Result Value Ref Range    Glucose 160 (H) 70 - 99 mg/dL   Glucose by meter   Result Value Ref Range    Glucose 97 70 - 99 mg/dL   Glucose by meter   Result Value Ref Range    Glucose 113 (H) 70 - 99 mg/dL   Glucose by meter   Result Value Ref Range    Glucose 115 (H) 70 - 99 mg/dL   Glucose by meter   Result Value Ref Range    Glucose 205 (H) 70 - 99 mg/dL   Glucose by meter   Result Value Ref Range    Glucose 212 (H) 70 - 99 mg/dL   Glucose by meter "   Result Value Ref Range    Glucose 173 (H) 70 - 99 mg/dL   Glucose by meter   Result Value Ref Range    Glucose 162 (H) 70 - 99 mg/dL   Glucose by meter   Result Value Ref Range    Glucose 98 70 - 99 mg/dL   Glucose by meter   Result Value Ref Range    Glucose 90 70 - 99 mg/dL   Glucose by meter   Result Value Ref Range    Glucose 125 (H) 70 - 99 mg/dL   Glucose by meter   Result Value Ref Range    Glucose 130 (H) 70 - 99 mg/dL   Glucose by meter   Result Value Ref Range    Glucose 129 (H) 70 - 99 mg/dL   Glucose by meter   Result Value Ref Range    Glucose 118 (H) 70 - 99 mg/dL   Glucose by meter   Result Value Ref Range    Glucose 97 70 - 99 mg/dL   Glucose by meter   Result Value Ref Range    Glucose 125 (H) 70 - 99 mg/dL   Glucose by meter   Result Value Ref Range    Glucose 107 (H) 70 - 99 mg/dL   Glucose by meter   Result Value Ref Range    Glucose 110 (H) 70 - 99 mg/dL   Glucose by meter   Result Value Ref Range    Glucose 130 (H) 70 - 99 mg/dL   Glucose by meter   Result Value Ref Range    Glucose 145 (H) 70 - 99 mg/dL   Glucose by meter   Result Value Ref Range    Glucose 122 (H) 70 - 99 mg/dL       [NK1.1] Kai[NK1.2] staffed the patient.    Staff Involved:  Resident([NK1.1]Kiara Zelaya[NK1.2])/Staff(as above)[NK1.1]                [NK1.2]             Revision History        User Key Date/Time User Provider Type Action    > DM1.1 1/17/2018 10:11 AM Shadi Quinn MD Physician Sign     NK1.2 1/10/2018  1:05 PM Kiara Zelaya MD Resident Sign     NK1.1 1/10/2018 11:39 AM Kiara Zelaya MD Resident             Consults by Bebe Silva MD at 1/13/2018 11:32 AM     Author:  Bebe Silva MD Service:  Urology Author Type:  Resident    Filed:  1/13/2018  7:52 PM Date of Service:  1/13/2018 11:32 AM Creation Time:  1/13/2018 11:23 AM    Status:  Attested :  Bebe Silva MD (Resident)    Cosigner:  Sriram Patterson MD at 1/15/2018 11:12 AM         Consult  "Orders:    1. Urology IP Consult: multiple obstructing right renal stones; Consultant may enter orders: Yes; Patient to be seen: Routine - within 24 hours [[id number]] ordered by Cinthia Mckeon MD at 01/13/18 0743           Attestation signed by Sriram Patterson MD at 1/15/2018 11:12 AM        I did not see the patient, but I reviewed the pertinent labs, images and medical history and exam with Dr. Silva and agree with the plan formulated.    Will have him follow up with one of our stone surgeons for treatment if it doesn't pass and ureteroscopy of the hydronephrotic left ureter.    Sriram Patterson MD  Department of Urology Staff  [facility]                                   Urology Consult    Name: Christopher Cuevas    MRN: [id number]   YOB: 1928       We were asked to see Christopher Cuevas at the request of Dr. Mckeon for evaluation and treatment of the following chief complaint.        Chief Complaint:   \"multiple obstructing renal stones\"    History is obtained from the patient and chart review           History of Present Illness:   Christopher Cuevas is a 89 year old male with h/o HTN, CAD s/p CABG, and dementia who was transferred from OSH for ICH on CT.  He presented to outside hospital after a fall during which he struck his head and concern for stroke.  Urology were consulted for incidental finding of stone on CT scan. Patient denies pain, hematuria or dysuria.[AS1.1] He thinks he passed a stone few years ago but didn't require any interventon[AS1.2].[AS1.1] No back or flank pain. No fever or chills.[AS1.2]            Past Medical History:[AS1.1]     Past Medical History:   Diagnosis Date     Chronic kidney disease      Dementia      Dementia      Diabetes (H)      Gastroesophageal reflux disease      Hyperkalemia      Hyperlipidemia      Hypertension[AS1.3]             Past Surgical History:[AS1.1]   No past surgical history on file.[AS1.3]         Social History:[AS1.1] "     Social History   Substance Use Topics     Smoking status: Former Smoker     Smokeless tobacco: Not on file      Comment: quit 45 years ago     Alcohol use Not on file[AS1.3]            Allergies:[AS1.1]   No Known Allergies[AS1.3]         Medications:[AS1.1]     Current Facility-Administered Medications   Medication     traZODone (DESYREL) tablet 100 mg     insulin aspart (NovoLOG) inj (RAPID ACTING)     insulin glargine (LANTUS) injection 20 Units     insulin aspart (NovoLOG) inj (RAPID ACTING)     insulin aspart (NovoLOG) inj (RAPID ACTING)     amLODIPine (NORVASC) tablet 5 mg     sodium chloride (OCEAN) 0.65 % nasal spray 1 spray     cetirizine (zyrTEC) tablet 10 mg     HYDROmorphone (PF) (DILAUDID) injection 1 mg     emollient (VANICREAM) cream     aspirin chewable tablet 81 mg     ondansetron (ZOFRAN) injection 4 mg     acetaminophen (TYLENOL) tablet 650 mg     triamcinolone (KENALOG) 0.1 % ointment     insulin aspart (NovoLOG) inj (RAPID ACTING)     naloxone (NARCAN) injection 0.1-0.4 mg     hydrALAZINE (APRESOLINE) injection 10-20 mg     glucose 40 % gel 15-30 g    Or     dextrose 50 % injection 25-50 mL    Or     glucagon injection 1 mg     labetalol (NORMODYNE/TRANDATE) injection 20 mg     mupirocin (BACTROBAN) 2 % cream     imiquimod (ALDARA) 5 % cream 0.25 g     pravastatin (PRAVACHOL) tablet 20 mg     tamsulosin (FLOMAX) capsule 0.4 mg[AS1.3]             Review of Systems:    ROS: 10 point ROS neg other than the symptoms noted above in the HPI           Physical Exam:   VS:  T: 98.2    HR: 87    BP: 144/78    RR: 20   GEN:  AOx3.  NAD.    CV:  RRR  LUNGS: Non-labored breathing.   BACK:  No midline or CVA tenderness.  ABD:  Soft.  NT.  ND.  No rebound or guarding.  No masses.  EXT:  Warm, well perfused.[AS1.1]  no[AS1.2] edema.[AS1.1] Erythema with signs of scratching on LLE[AS1.2]  SKIN:  Warm.  Dry.  No rashes.  NEURO:  CN grossly intact.            Data:   All laboratory data  reviewed:[AS1.1]      Recent Labs  Lab 01/13/18  0730 01/12/18  0705 01/11/18  1347 01/11/18  0806   WBC 3.7* 4.0 3.4* 3.6*   HGB 10.8* 10.8* 10.6* 10.5*   PLT 84* 82* 91* 87*       Recent Labs  Lab 01/13/18  0730 01/12/18  0705 01/11/18  0806 01/10/18  1049  01/08/18  0934   * 143 141 143  < > 140   POTASSIUM 3.7 3.6 3.9 3.7  < > 3.6   CHLORIDE 118* 116* 115* 111*  < > 105   CO2 17* 20 18* 20  < > 23   BUN 17 19 27 35*  < > 64*   CR 1.44* 1.53* 1.65* 1.88*  < > 2.71*   * 102* 218* 126*  < > 228*   LIZ 8.6 8.3* 8.3* 8.6  < > 10.5*   MAG  --   --   --   --   --  2.1   PHOS  --   --   --  2.2*  --  3.8   < > = values in this interval not displayed.    Recent Labs  Lab 01/07/18  2245   COLOR Yellow   APPEARANCE Clear   URINEGLC >1000*   URINEBILI Negative   URINEKETONE 5*   SG 1.012   URINEPH 5.5   PROTEIN 10*   NITRITE Negative   LEUKEST Negative   RBCU 7*   WBCU 2[AS1.3]       All pertinent imaging reviewed:    Results for orders placed or performed during the hospital encounter of 01/08/18   CT Head w/o Contrast    Narrative    CT HEAD W/O CONTRAST 1/8/2018 5:51 AM    Provided History: hemorrhage vs calcification on previous CT;     Comparison: 1/7/2018.    Technique: Using multidetector thin collimation helical acquisition  technique, axial, coronal and sagittal CT images from the skull base  to the vertex were obtained without intravenous contrast.     Findings:    Unchanged subtle diffuse hyperdensity of the left putamen comparison  with the right right putamen. No other area concerning for acute  hemorrhage. No mass effect or midline shift. Moderate diffuse cerebral  volume loss. The ventricles are proportionate to the cerebral sulci.  Extensive confluent areas of decreased density in the cerebral white  matter bilaterally that are consistent with sequela of chronic small  vessel ischemic disease. The basal cisterns are patent. No acute loss  of gray-white matter differentiation. Extensive calcified  plaques  along the vertebral arteries. Small lacunar infarction in the left  caudate head.    The visualized paranasal sinuses are clear. The mastoid air cells are  clear.       Impression    Impression:   1. Redemonstration of subtle asymmetric diffuse hyperdensity of the  left putamen. This is more likely to reflect mineralization. Given the  clinical history of choeroathetosis, an MRI would be helpful to  further investigate.     2. No new changes    3. Advanced parenchymal volume loss.    4. Extensive chronic white matter microvascular ischemic changes.     I have personally reviewed the examination and initial interpretation  and I agree with the findings.    MADHURI FOREMAN MD   MR Brain w/o Contrast    Addendum: 1/10/2018    Asymmetric hyperdensity in the basal ganglia on CT, especially the  putamen, when associated with T1 hyperintensity on MRI has been seen  in patients with prolonged hyperglycemia.    AKSHAT CURTIS MD      Narrative    MR BRAIN W/O CONTRAST 1/10/2018 9:54 AM    History: Eval for asymmetric L putamenal hyperdensity on CT;     Comparison:  Head CT 1/8/2008     Technique: Sagittal T1-weighted and axial fat-saturated T2-weighted,  fat-saturated T2 FLAIR, susceptibility weighted images and  diffusion-weighted images with ADC map of the brain were obtained  without intravenous contrast.    Findings: There is diffuse generalized cerebral/cerebellar volume loss  along with moderate findings of chronic small vessel ischemic disease,  primarily involving the periventricular white matter. No evidence of  acute infarct on diffusion-weighted imaging. 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    Bilateral susceptibility artifacts in the globi pallidi and putamina  and slight asymmetric T1 hyperintensity in the left basal ganglia  area. Correlated with head CT from 2 days ago and phase maps of  susceptibility weighted images, this is most likely  secondary to  asymmetric mineralization of the basal ganglia.    No ventriculomegaly. No hydrocephalus. Patent major flow voids.  Atherosclerotic calcification surrounding the vertebrobasilar  arteries.    Paranasal sinuses are clear. Mild left mastoid effusion. Bilateral  pseudophakia. Otherwise orbital structures are unremarkable.      Impression    Impression:  1. Bilateral mineralization in the basal ganglia, greater on the left.  2. Diffuse generalized cerebral/cerebellar volume loss and  accompanying moderate leukoaraiosis.  3. No new intracranial findings since 1/8/2018    I have personally reviewed the examination and initial interpretation  and I agree with the findings.    AKSHAT CURTIS MD   CT Chest Abdomen Pelvis w/o Contrast    Narrative    EXAMINATION: CT CHEST ABDOMEN PELVIS W/O CONTRAST, 1/12/2018 10:57 AM    TECHNIQUE:  Helical CT images from the thoracic inlet through the  symphysis pubis were obtained without contrast.    CONTRAST DOSE: No IV contrast was used    COMPARISON: No comparisons available    HISTORY: concern for hypercalcemia and altered mental status with  underlying malignancy;     FINDINGS:    Chest:     Respiratory motion artifact limits fine detail at the lung bases.    CABG.    Heterogeneous appearance of the thyroid. Heart size is within normal  limits. Extensive coronary calcifications/coronary stents. No  pericardial effusion. Scattered atherosclerotic calcifications of the  major cervical arterial origins, thoracic aorta and the abdominal  aorta. The pulmonary artery is not dilated. No thoracic  adenopathy.Small hiatal hernia.    Central tracheobronchial tree is patent.     Series 4, image 48: 4 mm left upper lobe nodule. Series 4, image 63:  tiny pleural-based nodule, but potentially lymphatic.    Small bilateral pleural effusions. No pneumothorax. Atelectasis.    Abdomen and pelvis:  Limited evaluation secondary to lack of IV contrast. Respiratory  motion artifact, most  pronounced in the mid and upper abdomen.    A 2.3 cm simple left hepatic cyst. Otherwise normal appearance of the  liver and gallbladder. Extensive splenic arterial calcifications. The  spleen parenchyma is unremarkable. Negative left adrenal gland.  Indeterminate right adrenal nodule measures 1.5 x 1.5 cm.   Atrophic  pancreas. Numerous prominent renal cortical and renal sinus cysts  involving both kidneys with multiple bilateral fluid density cysts. A  right interpolar lesion is a thin calcified septation. Multiple right  lower pole renal stones measuring up to 2.6 cm in sum (series 8 image  70, series 9 image 70). Non-obstructive 8 mm distal right ureteral  stone (image 500). No left hydronephrosis. Mild distal left  hydroureter. No left ureteral stone. Normal appearance of the  pancreas. The distal left ureter is dilated (series 3, image 476),  measuring up to 1.6 cm. No definite obstructing stone or mass or stone  is seen on this noncontrast exam; however, nodular intravesicular  extrusion/protrusion of the prostate extends towards the left ureteral  orifice.    Irregular lobulated calcified prostate with protruding hypertrophied  median lobe and associated intravesicular nodular protrusion/extrusion  as above. Unremarkable urinary bladder.    Sigmoid diverticula without evidence of diverticulitis. Unremarkable  appendix. No bowel obstruction or abnormal bowel wall thickening.  No  ascites. No pneumatosis, portal venous gas or free air. No  abdominopelvic lymphadenopathy.    Bones and soft tissues:  Intact median sternotomy wires. No suspicious osseous findings. Mild  multilevel degenerative changes of thoracolumbar spine.      Impression    IMPRESSION:   Limited study secondary to lack of IV contrast.   1. Indeterminate right adrenal nodule measures up to 1.5 cm. If  further evaluation is desired, and if the patient can receive  intravenous contrast, consider follow up CT following an adrenal  nodule  protocol.  2. Prostate is enlarged and irregular with median lobe hypertrophy.  Nonspecific, but most commonly secondary to BPH.   3. Multiple nonobstructing right renal stones including numerous right  lower pole stones which when combined measure up to 2.6 cm.  Non-obstructive 8 mm distal right ureteral stone.  4. At least moderate dilatation of the distal left ureter without  definitive obstructing mass or stone. Nodular left prostate and to the  left ureteral orifice and may be causing obstruction; however, CT  urogram and/or ureteroscopy could be considered for additional  evaluation.  5. Small and technically indeterminate 4 mm left upper lobe pulmonary  nodule. Given the absence of a known primary tumor, this nodule is  below the size threshold origin follow-up most the patient is  determined to be high risk in which case follow-up chest CT in 12  months could be considered.    I have personally reviewed the examination and initial interpretation  and I agree with the findings.    NILDA ZHU MD            Impression and Plan:   Impression:   Christopher Cuevas is a 89 year old male with no significant urologic hx who was found to have 8 mm distal ureteral stone on right side as well as non-obstructing stone in renal pelvis, and left distal hydroureter.  ANISHA believed to be prerenal and Cr is trending down, no right sided hydronephrosis   Left distal hydroureter with no clear evidence of obstruction   No fever, chills or leukocytosis   UA from 1/7 is negative         Plan:     - recommend[AS1.1] medical expulsive therapy with flomax[AS1.2]     -[AS1.1] obtain KUB to see if stone seen on xray, mostly for follow up[AS1.2]    -[AS1.1] will need emergent stenting of right and left side if patient develops fever, chills or sign of urinary infection[AS1.2]             This patient's exam findings, labs, and imaging discussed with urology staff surgeon [AS1.1]Weight[AS1.2], who developed the treatment plan.    Bebe  MD Rciardo  Urology Resident PGY[AS1.1]3[AS1.2]          Revision History        User Key Date/Time User Provider Type Action    > AS1.2 1/13/2018  7:52 PM Bebe Silva MD Resident Sign     AS1.3 1/13/2018 11:32 AM Bebe Silva MD Resident Share     AS1.1 1/13/2018 11:23 AM Bebe Silva MD Resident             Consults by Shalonda Jiménez RN at 1/13/2018  2:34 PM     Author:  Shalonda Jiménez RN Service:  Patient Learning Author Type:  Registered Nurse    Filed:  1/13/2018  2:34 PM Date of Service:  1/13/2018  2:34 PM Creation Time:  1/13/2018  2:33 PM    Status:  Signed :  Shalonda Jiménez RN (Registered Nurse)     Consult Orders:    1. Swallow Eval Speech Path Bedside IP Consult [[id number]] ordered by Cinthia Mckeon MD at 01/12/18 1113     2. Patient [facility] IP Consult [[id number]] ordered by Bradley Patiño MD at 01/08/18 0341                1/13/18 Per unit nurse a stroke and hemorrhage was ruled out and PLC stroke education not needed.[LS1.1]     Revision History        User Key Date/Time User Provider Type Action    > LS1.1 1/13/2018  2:34 PM Shalonda Jiménez, RN Registered Nurse Sign            Consults by Miguel Angel Avila MD at 1/10/2018 12:20 PM     Author:  Miguel Angel Avila MD Service:  Neurology Author Type:  Resident    Filed:  1/10/2018  6:53 PM Date of Service:  1/10/2018 12:20 PM Creation Time:  1/10/2018 10:14 AM    Status:  Attested :  Miguel Angel Avila MD (Resident)    Cosigner:  Bob Leach MD at 1/11/2018 12:07 PM        Attestation signed by Bob Leach MD at 1/11/2018 12:07 PM        Attestation:  Physician Attestation   I, Bob Leahc, saw this patient with the resident and agree with the resident s findings and plan of care as documented in the resident s note.      I personally reviewed vital signs, medications, labs and imaging.    Key findings: Patient is frail 89 year old man with  unilateral chorea. The most likely cause is a recrudesces of an old vascular injury that is being exacerbated by his illness. I feel that this should clear as he medically stabilizes. I would recommend hold off of treating the chorea for two weeks after medically stablizing. He should be followed outpatient to ensure that his chorea does resolve.     Bob Leach  Date of Service (when I saw the patient): 1/10/18                               [facility]: [place]  Neurology Consultation    Patient Name:  Christopher Cuevas  MRN:  [id number]    :  1928  Date of Admission:  2018  Date of Service:  January 10, 2018  Primary care provider:  Lucas Thompson      We were asked to see the patient by[AP1.1] Dr. Mckeon[JR1.1] to evaluate[AP1.1] abnormal movements.[JR1.1]    History of Present Illness:   Christopher Cuevas is a 89 year old[AP1.1] gentleman[JR1.2] with PMH of dementia,[AP1.2] HTN, CAD s/p CABG, and[JR1.2] poorly controlled[AP1.2] T2DM[JR1.2] w[AP1.1]ho was admitted on  for[JR1.2] RUE[AP1.1]\RLE[AP1.3] choreoathetosis[AP1.1], initial imaging concerning for ICH, and HHS[JR1.2].[AP1.1] The patient[JR1.1] recently developed weakness of the RUE around 1/3/18[AP1.1] and a[JR1.2]round 18 he began to have[AP1.1] abnormal[JR1.2] movements of his right upper and right lower extremities.[AP1.1] On the evening of 18 he had a fall with trauma to the head. He was brought to an OSH and HCT was suspicious for ICH prompting transfer here to [facility]. Upon admission follow up HCT showed stability and likely calcification.[AP1.2] The patient[JR1.2] was also found to have a BG of 565 in the field and 472 on admission.[AP1.2] She was evaluated by the stroke team and the abnormal movements were thought to be due to HHS.[JR1.2]    He has been very agitated[AP1.2] in the evenings[AP1.4] since admission[AP1.2] however he seems to be doing better today[AP1.4].[AP1.2] He continues to have  "RUE[AP1.3]/RLE[AP1.5] choreoathetosis.[AP1.3]     ROS: A 10-point ROS is negative except as noted above.      Past Medical/Surgical History:[AP1.1]  -CKD  -Dementia  -DM2  -Hyperkalemia  -HTN  -Hyperlipidemia  No past surgical hx on file[AP1.6]    Medications:[AP1.1]    Current Facility-Administered Medications   Medication     HYDROmorphone (PF) (DILAUDID) injection 1 mg     insulin glargine (LANTUS) injection 19 Units     insulin aspart (NovoLOG) inj (RAPID ACTING)     insulin aspart (NovoLOG) inj (RAPID ACTING)     Med Instruction - Transition from IV Insulin Infusion to Sub-Q Insulin     acetaminophen (TYLENOL) tablet 650 mg     0.9% sodium chloride infusion     triamcinolone (KENALOG) 0.1 % ointment     insulin aspart (NovoLOG) inj (RAPID ACTING)     insulin aspart (NovoLOG) inj (RAPID ACTING)     naloxone (NARCAN) injection 0.1-0.4 mg     hydrALAZINE (APRESOLINE) injection 10-20 mg     insulin 1 unit/mL in saline (NovoLIN, HumuLIN Regular) drip - ADULT IV Infusion     glucose 40 % gel 15-30 g    Or     dextrose 50 % injection 25-50 mL    Or     glucagon injection 1 mg     labetalol (NORMODYNE/TRANDATE) injection 20 mg     mupirocin (BACTROBAN) 2 % cream     imiquimod (ALDARA) 5 % cream 0.25 g     pravastatin (PRAVACHOL) tablet 20 mg     tamsulosin (FLOMAX) capsule 0.4 mg[AP1.7]       Allergies:[AP1.1]  NKDA[AP1.6]    Social History:[AP1.1]  Former smoker. Lives at an assisted care facility. Fairly independent at baseline.[AP1.6]    Family History:[AP1.1]    No family History on file[AP1.6]    Neurologic Examination:    Vitals:[AP1.1] /58 (BP Location: Right arm)  Pulse 87  Temp 98  F (36.7  C) (Axillary)  Resp 18  Ht 1.727 m (5' 8\")  Wt 72.9 kg (160 lb 11.5 oz)  SpO2 97%  BMI 24.44 kg/m2[AP1.7]  General[AP1.1]: Lying in bed and in NAD[JR1.2]  HEENT:[AP1.1] AT/NC[JR1.2]   Neurologic:     Mental Status:[AP1.1] Drowsy but responding to verbal stimuli.[JR1.2] oriented[AP1.1] to person and place but " failed to recall current month and year[AP1.8]. Speech clear and fluent,[AP1.1] able to follow commands[AP1.8].      Cranial Nerves: PERRL. EOM[AP1.1]I. Blinking to threat bilaterally.[JR1.2] Facial sensation intact/symmetric. Facial movements symmetric. Hearing intact to conversation. Palate elevation symmetric, uvula midline. No dysarthria. Tongue protrusion midline.     Motor:[AP1.1] I[AP1.8]nvoluntary[AP1.1] choreoathotic[JR1.2] movement[AP1.1]s[JR1.2] of the RUE and R[AP1.8]LE[JR1.2] which also involved grimace of the right side of the face.[AP1.8] Normal tone.[AP1.1] Moving all extremities spontaneously and antigravity.[JR1.2]     Deep Tendon Reflexes: 2+/symmetric throughout.      Sensory: Light touch sensation intact/symmetric throughout upper and lower extremities.      Coordination: FNF intact without dysmetria.     Pertinent Investigations:[AP1.1]   Na 137, K 3.5, BUN 50, Cr 2.10, GFR 30  Ca , Mg 2.1, Phos, 3.8  ALT 18, AST 22, Alk Phos 56  Glucose 145, A1C 14.1  WBC 5.9, Plt 102[AP1.9]    Head CT w/o contrast 1/08/2018:  1. Redemonstration of subtle asymmetric diffuse hyperdensity of the left putamen. This is more likely to reflect mineralization. Given the  clinical history of choeroathetosis, an MRI would be helpful to further investigate.   2. No new changes  3. Advanced parenchymal volume loss.  4. Extensive chronic white matter microvascular ischemic changes    MRI Brain w/o contrast 1/10/18:[AP1.6]  1. Bilateral mineralization in the basal ganglia, greater on the left.  2. Diffuse generalized cerebral/cerebellar volume loss and accompanying moderate leukoaraiosis.  3. No new intracranial findings since 1/8/2018[AP1.10]    Impression:  Christopher Cuevas is a 89 year old[AP1.1] gentleman[JR1.2] with PMH of dementia,[AP1.2] HTN, CAD s/p CABG, and[JR1.2] poorly controlled[AP1.2] T2DM[JR1.2] w[AP1.1]ho was admitted on 1/8 for[JR1.2] RUE[AP1.1]\RLE[AP1.3] choreoathetosis[AP1.1], initial imaging concerning  for ICH, and HHS[JR1.2].[AP1.1] Neurology was consulted for RUE/RLE choreoathetosis.  On examination the patient demonstrates i[JR1.2]nvoluntary[AP1.1] choreoathotic[JR1.2] movement[AP1.1]s[JR1.2] of the RUE and R[AP1.8]LE[JR1.2] which also involved grimace of the right side of the face.[AP1.8] Given the acute onset of these movements the differential includes HHS and stroke. Agree with stroke team as documented by Dr. Osorio that these movements are most likely due to HHS.  The movements should improve over the next few weeks with improvement in blood glucose control.[JR1.2]     Recommendations:[AP1.1]   -Continue with hyperglycemia management   -Follow up with[AP1.8] General Neurology in 1 month to reevaluate[JR1.2] choreoathetosis[AP1.8]. If movements do not improve by that time will consider medication options as an outpatient.[JR1.2]     Thank you for involving neurology in the care of Christopher Cuevas.  Please do not hesitate to call with questions/concerns.      Patient was seen and discussed with attending neurologist, [AP1.1] Bob Leach M.D[AP1.10].[AP1.1]    Patient's note, history, and part of physical examination assisted by John Manning MS3[AP1.11]. Reviewed with Dr. Margarita Avila MD  Neurology PGY2  [id number][JR1.3]           Revision History        User Key Date/Time User Provider Type Action    > JR1.2 1/10/2018  6:53 PM Miguel Angel Avila MD Resident Sign     [N/A] 1/10/2018  4:51 PM Miguel Angel Avila MD Resident Share     JR1.3 1/10/2018  4:50 PM Miguel Angel Avila MD Resident Share     JR1.1 1/10/2018  3:48 PM Miguel Angel Avila MD Resident Share     [N/A] 1/10/2018  3:21 PM [employer] Share     [N/A] 1/10/2018  3:19 PM [employer] Student Share     [N/A] 1/10/2018  3:18 PM [employer] Student Share     AP1.10 1/10/2018  3:17 PM [employer] Student Share     [N/A] 1/10/2018   2:16 PM Philaya, John Medical Student Share     [N/A] 1/10/2018  2:15 PM Philaya, John Medical Student Share     AP1.5 1/10/2018  2:14 PM Philaya, John Medical Student Share     AP1.8 1/10/2018  2:02 PM Philaya, John Medical Student      AP1.3 1/10/2018  1:25 PM Philaya, John Medical Student Share     AP1.4 1/10/2018  1:23 PM Philaya, John Medical Student Share     AP1.11 1/10/2018 11:01 AM Philaya, John Medical Student Share     AP1.9 1/10/2018 10:58 AM Philaya, John Medical Student Share     AP1.6 1/10/2018 10:46 AM Philaya, John Medical Student Share     [N/A] 1/10/2018 10:38 AM Philaya, John Medical Student Share     AP1.2 1/10/2018 10:36 AM Philaya, John Medical Student Share     AP1.7 1/10/2018 10:25 AM Philaya, John Medical Student Share     AP1.1 1/10/2018 10:14 AM Philaya, John Medical Student             Consults by Jordi Kothari APRN CNP at 1/9/2018  9:02 AM     Author:  Jordi Kothari APRN CNP Service:  Endocrinology Author Type:  Nurse Practitioner    Filed:  1/9/2018  3:09 PM Date of Service:  1/9/2018  9:02 AM Creation Time:  1/9/2018  9:01 AM    Status:  Signed :  Jordi Kothari APRN CNP (Nurse Practitioner)     Consult Orders:    1. Endocrine Diabetes Adult IP Consult: Patient to be seen: Routine within 24 hrs; Call back #: [phone]; Patient with dementia admit from OSH with possible ICH. BG in mid 500's. A1c 14.1 previousy in 5's range 1 year ago. On insulin gtt; Consultant may ... [[id number]] ordered by Aparna Babin PA-C at 01/08/18 8869                Diabetes/Hyperglycemia Management Consult    Chief Complaint glycemic management  Consult requested by: Aparna Babin PA-C  History of Present Illness Christopher Cuevas[JM1.1] is a 89 year old male with history of dementia, hypertension, CAD s/p CABG,  GERD, and a recent fall who was admitted to the NICU (1/7/2018) from OSH with concern for ICH. CT images suggestive of calcification vs ICH vs  "related to hyperglycemia.    Information was obtained via review of medical records and talking with daughter. Mr. Cuevas is cognitively impaired and agitated.  Mr. Monae was dx with Diabetes on (1/432018) during a clinic appointment. Per family was started on lantus ( not sure of dosage) on (1/4/2018). Was re-evaluated for \"involuntary right arm movements\" lantus was increased. No previous history of diabetes[JM1.2], but daughter reports likes to eat \"sugar\" foods.[JM1.3]  Called and talked with staff from [facility][JM1.2], [facility], Mr. Cuevas was started on Lantus 10 units in the morning, first dose 1/4 and blood sugars were tested daily before breakfast.   Blood sugars ranged from 431-512. Lantus was increased to 15 units starting 1/6 . Per staff, his A1C was tested at the clinic visit and was > 14%    Blood sugars on admission > 400. Was given 10 units of Regular insulin and started on IV insulin. IV insuiln ranged from 0-5 units while on IV fluids containing dextrose.   Tolerated a regular diet[JM1.3]      Recent Labs  Lab 01/09/18  0803 01/09/18  0643 01/09/18  0642 01/09/18  0517 01/09/18  0416 01/09/18  0328 01/09/18  0219  01/08/18  2022 01/08/18  0934  01/07/18 2000   GLC  --  147*  --   --   --   --   --   --  222*  --  228*  --  472*   *  --  152* 95 127* 143* 120*  < >  --   < >  --   < >  --    < > = values in this interval not displayed.      Diabetes Type:[JM1.1]   Type 2 Diabetes[JM1.2]  Diabetes Duration:[JM1.1] recently dx[JM1.2]  Usual Diabetes Regimen:[JM1.1]  Lantus 15 units[JM1.3]    Ability to [place] Prescribed Regimen:[JM1.1] no, due to cognition[JM1.3]  Diabetes Control:   Lab Results   Component Value Date    A1C 14.1 01/08/2018     Diabetes Complications:[JM1.1] unknown[JM1.3]  History of DKA:[JM1.1] no history[JM1.3]  Able to Detect Hypoglycemia:[JM1.1] no history[JM1.3]  Usual Diabetes Care Provider:[JM1.1] Dr. Juan Gongora at [facility]" "Clinic[JM1.3]  Factors Impacting Glucose Control:[JM1.1] diet[JM1.3]      Review of Systems[JM1.1]  Unable to do a review of symptoms 2/2 dementia and agitation[JM1.3]    Past medical, family and social histories are reviewed and updated.    Past Medical History[JM1.1], reviewed and updated as indicated[JM1.3]  Past Medical History:   Diagnosis Date     Chronic kidney disease      Dementia      Dementia      Diabetes (H)      Gastroesophageal reflux disease      Hyperkalemia      Hyperlipidemia      Hypertension        Family History[JM1.1]  Patient unable to give family hx[JM1.3]    Social History  Social History     Social History     Marital status:      Spouse name: N/A     Number of children: N/A     Years of education: N/A     Social History Main Topics     Smoking status: Former Smoker     Smokeless tobacco: Not on file      Comment: quit 45 years ago     Alcohol use Not on file     Drug use: Not on file     Sexual activity: Not on file     Other Topics Concern     Not on file     Social History Narrative         Physical Exam[JM1.1]  /49 (BP Location: Left arm)  Pulse 87  Temp 98.2  F (36.8  C) (Oral)  Resp 18  Ht 1.727 m (5' 8\")  Wt 72.9 kg (160 lb 11.5 oz)  SpO2 98%  BMI 24.44 kg/m2[JM1.4]    General:[JM1.1]  NAD[JM1.3].   HEENT:  PER and anicteric, non-injected, oral mucous membranes moist.   Lungs:[JM1.1] unlabored[JM1.3]  ABD:[JM1.1] soft[JM1.3]  Skin: warm and dry, no obvious lesions[JM1.1] to exposed areas[JM1.3]  MSK:[JM1.1] moving all extremities[JM1.3]  Mental status:[JM1.1] confused[JM1.3]  Psych:[JM1.1] agitated[JM1.3]    Laboratory  Recent Labs   Lab Test  01/09/18   0643  01/08/18 2022   NA  137  140   POTASSIUM  3.5  3.6   CHLORIDE  107  107   CO2  20  24   ANIONGAP  11  8   GLC  147*  222*   BUN  50*  54*   CR  2.10*  2.24*   LIZ  9.5  9.8     CBC RESULTS:   Recent Labs   Lab Test  01/09/18   0643   WBC  5.9   RBC  3.65*   HGB  11.5*   HCT  33.9*   MCV  93   MCH  31.5 "   MCHC  33.9   RDW  13.0   PLT  102*       Liver Function Studies -   Recent Labs   Lab Test  01/09/18   0643   PROTTOTAL  5.6*   ALBUMIN  3.0*   BILITOTAL  0.6   ALKPHOS  56   AST  22   ALT  18       Active Medications  Current Facility-Administered Medications   Medication     acetaminophen (TYLENOL) tablet 650 mg     naloxone (NARCAN) injection 0.1-0.4 mg     hydrALAZINE (APRESOLINE) injection 10-20 mg     insulin 1 unit/mL in saline (NovoLIN, HumuLIN Regular) drip - ADULT IV Infusion     glucose 40 % gel 15-30 g    Or     dextrose 50 % injection 25-50 mL    Or     glucagon injection 1 mg     labetalol (NORMODYNE/TRANDATE) injection 20 mg     mupirocin (BACTROBAN) 2 % cream     imiquimod (ALDARA) 5 % cream 0.25 g     pravastatin (PRAVACHOL) tablet 20 mg     tamsulosin (FLOMAX) capsule 0.4 mg     dextrose 5% and 0.45% NaCl infusion     No current outpatient prescriptions on file.       Current Diet    Active Diet Order      Regular Diet Adult      Assessment[JM1.1]:[JM1.3] Christopher Cuevas[JM1.1] is a 89 year old male with history of dementia, hypertension, CAD s/p CABG,  GERD, and a recent fall who was admitted to the NICU (1/7/2018) from OSH with concern for ICH. CT images suggestive of calcification vs ICH vs related to hyperglycemia.[JM1.2]    Type 2 diabetes: poorly controlled with A1C > 14% ( 1/7/2018)[JM1.3]  ANISHA on CKD: Cr on admission 3.30, Cr trending downward 2.71--> 2.24--> 2.10[JM1.5]    Plan[JM1.1]  Dextrose fluids were d/c this am ( had been receiving 5 gram of dextrose per hour)  -will add prandial insulin at 1 unit per 15 grams of CHO with meals and snacks ( determined by weight based)  -continue on IV insulin overnight, to determine basal insulin dose and plan to transition to sub-q insulin in the am  Will continue to follow[JM1.5]    Jordi Kothari, [phone]    Diabetes Management Team job code: [id number][JM1.1]       Revision History        User Key Date/Time User Provider Type Action    > JM1.5  "2018  3:09 PM Jordi Kothari APRN CNP Nurse Practitioner Sign     JM1.4 2018  2:54 PM Jordi Ktohari APRN CNP Nurse Practitioner      JM1.3 2018  2:44 PM Jordi Kothari APRN CNP Nurse Practitioner      JM1.2 2018  2:10 PM Jordi Kothari APRN CNP Nurse Practitioner      JM1.1 2018  9:01 AM Jordi Kothari APRN CNP Nurse Practitioner             Consults by Tatiana Teixeira MSW at 2018  3:13 PM     Author:  Tatiana Teixeira MSW Service:  Social Work Author Type:      Filed:  2018 10:05 AM Date of Service:  2018  3:13 PM Creation Time:  2018  3:13 PM    Status:  Signed :  Tatiana Teixeira MSW ()     Consult Orders:    1. Social Work IP Consult [[id number]] ordered by Bradley Patiño MD at 18 0341                Social Work: Assessment with Discharge Plan    Patient Name:  Christopher Cuevas  :  [id number]  Age:  89 year old  MRN:  [id number]  Risk/Complexity Score:  Filed Complexity Screen Score: 4  Completed assessment with:[LF1.1]  Pt's daughter-Cassandra, chart review, attended rounds[LF1.2]     Presenting Information   Reason for Referral:[LF1.1]  Stroke consult[LF1.2]  Date of Intake:  2018  Referral Source:[LF1.1]  Consult[LF1.2]  Decision Maker:[LF1.1]  Patient at baseline.[LF1.2]  Alternate Decision Maker:[LF1.1]  P[LF1.2]t's daughter Sophy states that she is listed as health care agent in pt's HCD.[LF1.3]   Health Care Directive:[LF1.1] None filed (or scanned yet?), but daughter Sophy says she has a copy with her. SW to follow up on this.[LF1.3]   Living Situation:[LF1.1]  Assisted Living:  [facility] in [place], [place] with wife.[LF1.3]  Previous Functional Status:[LF1.1]  Pt and wife receive \"level 2\" services at their FDC, which includes 1hr/day assistance with bathing, meds, escorting to meals. Pt has a walker, but does not use it.[LF1.3]   Patient and family understanding of " hospitalization:[LF1.1]  Restorative[LF1.3]   Cultural/Language/Spiritual Considerations:[LF1.1]  English-speaking. Pt's daughter reports that both pt and wife appear to have mild dementia.[LF1.3]   Adjustment to Illness:[LF1.1]  Pt unable to engage at this time, but per daughter, he's frustrated with being in the hospital. Daughter states they're taking things one day at a time and hope to have a better understanding of what's going on as the results of tests/scans come back.[LF1.3]     Physical Health  Reason for Admission:    1. Choreiform movement    2. Hyperglycemia      Services Needed/Recommended:[LF1.1]  TCU vs home with home care[LF1.3]     Mental Health/Chemical Dependency  Diagnosis:[LF1.1]  None listed in Problem List. Did not address.[LF1.3]   Support/Services in Place:[LF1.1]  NA[LF1.3]   Services Needed/Recommended:[LF1.1]  NA[LF1.3]     Support System  Significant relationship at present time:[LF1.1]  Wife[LF1.3]   Family of origin is available for support:[LF1.1]  Pt's daughters live in [place] (Sophy) and [place] (Graciela). Both are involved and supportive. Pt also has a son that Sophy reports is cognitively delayed and lives in a group home, but visits pt and his wife every Sunday.[LF1.3]    Other support available:[LF1.1]  Son-in-laws, assisted living facility[LF1.3]   Gaps in support system:[LF1.1]  None identified.[LF1.3]   Patient is caregiver to:[LF1.1]  None[LF1.3]     Provider Information   Primary Care Physician:  Lucas Thompson   [phone]   Clinic:  [facility] [address] [place] [address] / [facility] [place] *      :[LF1.1]  Unknown[LF1.3]     Financial   Income Source:[LF1.1]  Did not address.[LF1.3]   Financial Concerns:[LF1.1]  Did not address.[LF1.3]   Insurance:    Payor/Plan Subscriber Name Rel Member # Group #   UCARE - UC[employer] FOR SEN* ILANA RUANO  [id number] [facility]      [address]       Discharge Plan   Patient and family discharge  goal:[LF1.1]  Pt's daughter thinks pt will be resistant to TCU and their hope is that he'll be able to receive home care. He has been to [facility] in [place] in the past. Family was not impressed with this facility, but would be okay with pt returning if it was the only option.[LF1.3]   Provided education on discharge plan:[LF1.1]  Yes, started discussion.[LF1.3]   Barriers to discharge:[LF1.1]  Medical stability, pt currently on 1:1[LF1.3]     Discharge Recommendations   Anticipated Disposition:[LF1.1]  TCU vs home with home care[LF1.3]  Transportation Needs:[LF1.1]  TBD[LF1.3]   Name of Transportation Company and Phone:[LF1.1]  TBD[LF1.3]     Additional comments[LF1.1]   Met with pt's daughter Sophy and her  Emilie at the bedside. Introduced self and role of SW. Pt was unable to engage, as he was sleepy and disoriented. Obtained info above. Sophy discussed pt and wife's recent transition to assisted living and that it has been a good change for them. She also discussed the stress involved with the transition, as pt's son is mentally challenged and she's unsure of his understanding of pt's hospitalization and the change in his visiting routine. They're trying to navigate this and are helping with transportation for pt's son to visit each Sunday. Discussed potential d/c plans for pt. They're aware that pt may need rehab, but are hopeful he will be able to return home with home care, as they don't think he will want to go to rehab. Both Sophy and her  were pleasant a and receptive to SW. Provided SW contact info should needs arise.     JANICE Izquierdo, [facility]  ICU Float   Pager: [phone]  [email or web address]     NO LETTER[LF1.3]         Revision History        User Key Date/Time User Provider Type Action    > LF1.3 1/9/2018 10:05 AM Tatiana Teixeira MSW  Sign     LF1.2 1/9/2018  8:38 AM Tatiana Teixeira MSW       LF1.1 1/8/2018  3:13 PM Lluvia  JANICE Simpson              Consults by Ray Nunes MD at 1/8/2018  2:22 AM     Author:  Ray Nunes MD Service:  Neurosurgery Author Type:  Resident    Filed:  1/8/2018  6:06 AM Date of Service:  1/8/2018  2:22 AM Creation Time:  1/8/2018  2:21 AM    Status:  Cosign Needed :  Ray Nunes MD (Resident)    Cosign Required:  Yes             [facility]    NEUROSURGERY CONSULTATION NOTE    This consultation was requested by Dr. Jerome from the Neurocritical Care service.    Reason for Consultation: Concern for possible hemorrhage into left putamen    HPI: Patient unable to participate in the history taking - obtained from daughters and EMR.   Christopher Cuevas is an 89 year old male with a history of unspecified dementia, recently uncontrolled Type II Diabetes, hypertension, hyperlipidemia and CAD s/p CABG (on ASA), who presented via ambulance from his assisted living facility to an OSH ED at the behest of his daughters for a progressively increasing confusion over the past few weeks and report of intermittent right arm and leg movements. His daughters note that he had a fall today at his assisted living facility but they were not witness to it. He is unable to corroborate this story.     A CT scan was performed at the OSH ED and a hyperdensity was found in the left putamen. Concerning for possible hemorrhage but the presence of calcification could not be ruled out. Neurosurgery was consulted.     PAST MEDICAL HISTORY:[DF1.1]   Past Medical History:   Diagnosis Date     Chronic kidney disease      Dementia      Dementia      Diabetes (H)      Gastroesophageal reflux disease      Hyperkalemia      Hyperlipidemia      Hypertension[DF1.2]      PAST SURGICAL HISTORY:   Cataract surgery  Hernia repair  Coronary artery bypass and graft.     FAMILY HISTORY:   Mother - MI, and stroke  Father - prostate cancer    SOCIAL HISTORY:[DF1.1]   Social History    Substance Use Topics     Smoking status: Former Smoker     Smokeless tobacco: Not on file      Comment: quit 45 years ago     Alcohol use Not on file[DF1.2]   Lives in assisted living facility.     MEDICATIONS:  ASA 81mg  HCTZ  Lisinopril  Pravastatin  Tamsulosin  Trazadone    Allergies:[DF1.1]  No Known Allergies[DF1.2]    ROS: Unable to complete given patients neurologic status.     PE:[DF1.1]  Temperature 97.6  F (36.4  C), temperature source Axillary, SpO2 97 %.[DF1.2]    NEUROLOGIC:  -- Awake; Alert; oriented x 2. Delirious. Attempting to get out of bed.   -- Follows commands intermittently. Difficult to get to focus.   -- Mildly dysarthric speech  -- No apparent hemineglect.  Cranial Nerves:  --  PERRL 3-2mm bilat and brisk, extraocular movements intact  -- face symmetrical, tongue midline  -- sensory V1-V3 intact bilaterally  -- palate elevates symmetrically, uvula midline  -- Poor hearing present in both ears.     Motor:   Delt Bi Tri FE IP Quad Hamst TibAnt EHL Gastroc    C5 C6 C7 C8/T1 L2 L3 L4-S1 L4 L5 S1   R 5 5 5 5 5 5 5 5 5 5   L 5 5 5 5 5 5 5 5 5 5     Sensory:   intact to LT  Gait: Unable to test.       ASSESSMENT: Christopher Cuevas is an 89 year old male with a newly found left putaminal hyperdensity concerning for calcification or possible hemorrhage.     RECOMMENDATIONS: To be formally staffed in AM.   - No neurosurgical intervention indicated at this time   - Requires ICU level observation at this time  - Repeat head CT in 5-6 hours from the time of first acquisition  - Avoid sedating medications that would alter a neurological examination    The patient was discussed with the neurosurgery chief resident, who agrees with the above outlined plan.    Contact the neurosurgery resident on call with questions.    Dial * * *[phone]: Enter [phone] when prompted.   Ray Nunes MD, PhD  Neurosurgery PGY-3[DF1.1]                                                         Revision History        User Key Date/Time  User Provider Type Action    > DF1.2 1/8/2018  6:06 AM Ray Nunes MD Resident Sign     DF1.1 1/8/2018  2:21 AM Ray Nunes MD Resident                      Progress Notes - Physician (Notes from 01/15/18 through 01/18/18)      Progress Notes by Mehdi Rivera MSW at 1/17/2018 11:37 AM     Author:  Mehdi Rivera MSW Service:  Social Work Author Type:      Filed:  1/17/2018  4:03 PM Date of Service:  1/17/2018 11:37 AM Creation Time:  1/17/2018 11:37 AM    Status:  Signed :  Mehdi Rivera MSW ()         Social Work Services Progress Note[GJ1.1]    Hospital Day: 10[GJ1.2]  Date of Initial Social Work Evaluation:[GJ1.1]  1/8/2017[GJ1.3]  Collaborated with:[GJ1.1]  Pts daughters, medical team, TCU's.[GJ1.3]    Data:[GJ1.1]  Pt needs TCU at discharge. Pt unable to go back to [facility] due to sliding scale.[GJ1.3]    Intervention:[GJ1.1]  SW sent referrals/called the following facilities.[GJ1.3]    [facility]. P: [phone], F: [phone]. Declined due to no beds. They offered for SW to call back when pt is medically ready to see about beds.  [facility] ([facility]): Declined pt due to bed availability.[GJ1.1]  [facility] (admissions) [phone], F: [phone]: Has beds available[GJ1.4]. Sent referral.[GJ1.3]  [facility]: P: [phone], F: [phone][GJ1.4].[GJ1.3] Has beds available[GJ1.4]. Sent referral.  [facility] ([place]), P: [phone], F: [phone]. No beds available.  [facility], P: [phone], F: [phone]. Sent referral and left message.    CHERELLE left message for RN at [facility]: [phone] to see if they contract with any facilities for TCU. CHERELLE also spoke with pts daughters via phone with [facility] and Dr. Gonsalez. Discussed how pts [facility] cannot take him back with his current sliding scale for blood sugar meds. Pts daughter understand and gave permission for CHERELLE to continue to look for facilities, even if  they are in [facility].[GJ1.3]     Assessment:[GJ1.1] Pts daughters open to SW looking at several facilities, they now realize it is not feasible for pt to return to [facility] given his current needs.[GJ1.3]    Plan:    Anticipated Disposition:[GJ1.1] TCU.[GJ1.3]    Barriers to d/c plan:[GJ1.1]  Medical stability, bed availability.[GJ1.3]    Follow Up:[GJ1.1]  SW will continue to coordinate discharge plans.    JANICE Bolton, [facility]  7A   Ph: [phone], Pager: [phone][GJ1.3]               Revision History        User Key Date/Time User Provider Type Action    > GJ1.3 1/17/2018  4:03 PM Mehdi Rivera MSW  Sign     GJ1.4 1/17/2018 11:53 AM Mehdi Rivera MSW       GJ1.2 1/17/2018 11:38 AM Mehdi Rivera MSW       GJ1.1 1/17/2018 11:37 AM Mehdi Rivera MSW              Progress Notes by Lila Luque RN at 1/17/2018  3:30 PM     Author:  Lila Luque RN Service:  Care Coordinator Author Type:  Care Coordinator    Filed:  1/17/2018  3:45 PM Date of Service:  1/17/2018  3:30 PM Creation Time:  1/17/2018  3:30 PM    Status:  Signed :  Lila Luque RN (Care Coordinator)           Care Coordinator- Discharge Planning     Admission Date/Time:  1/8/2018  Attending MD:  Minor Gonsalez MD     Data  Chart reviewed, discussed with interdisciplinary team.   Patient was admitted for:   1. Choreiform movement    2. Hyperglycemia         Assessment  Full assessment completed in previous note    Writer, Nicole (7A ), and Dr. Gonsalez had conference call with pt's daughters Sophy and Graciela to discuss discharge planning. Discussed pt's diabetes regimen and that pt's [facility] cannot accommodate[CB1.1] sliding scale which pt is currently on. Per Dr. Gonsalez, endocrine will start new medication (Victoza) which may simplify pt's regimen and in pt may be able to get off sliding scale in a couple of weeks. Pt's current diabetes needs CAN be met  at aTCU and the goal would be for pt to be able to go back to [facility]. Sophy and Graciela asked about memory units and if the memory unit within the [facility] could accommodate pt's needs. Writer spoke with ELSY Jalloh at the [facility], who stated that the memory unit does not have additional nursing staff available and therefore would not be able to accommodate sliding scale insulin regimen.Sophy and Graciela verbalized understanding of this and were agreeable to more referrals being made for TCU.[CB1.2]       Plan  Anticipated Discharge Date:[CB1.1]  TBD[CB1.2]  Anticipated Discharge Plan:[CB1.1]  TCU when facility found[CB1.2]      Lila Luque RN[CB1.1]     Revision History        User Key Date/Time User Provider Type Action    > CB1.2 1/17/2018  3:45 PM Lila Luque, RN Care Coordinator Sign     CB1.1 1/17/2018  3:30 PM Lila Luque RN Care Coordinator             Progress Notes by Melody Lambert APRN CNS at 1/17/2018 10:55 AM     Author:  Melody Lambert APRN CNS Service:  Endocrinology Author Type:  Advanced Practice RN    Filed:  1/17/2018  3:20 PM Date of Service:  1/17/2018 10:55 AM Creation Time:  1/17/2018 10:55 AM    Status:  Addendum :  Melody Lambert APRN CNS (Advanced Practice RN)                              Diabetes Consult Daily  Progress Note          Assessment/Plan:                          Christopher Cuevas is a 89 year old male with recent diagnosis of type 2 diabetes, history of dementia, hypertension, CAD s/p CABG,  GERD, and a recent fall who was admitted to the MICU (1/7/2018) from OSH with concern for ICH. CT images suggestive of calcification vs ICH vs related to hyperglycemia.        Improvement in glucose stability since last evening.      Plan  - continue glargine 17 units daily am for today  - linagliptin 5 mg daily[JH1.1] given today[JH1.2]  - aspart prandial insulin decrease[JH1.1]d[JH1.2] from 1 unit per 10 grams carb to 1 per 12, give promptly at end of eating  -  "aspart 1 unit per 40 > 140 before meals and > 200 HS  - monitor glucose before meals, HS and 0200  - having good glucose control is important, but due to age and underlying dementia, target 125-250 likely appropriate    - Since PA received for Victoza, plan to start that tomorrow, with d/c of linagliptin and reduction in insulin.  Depending on glucose response tomorrow, will be able to make recommendations for continued monitoring in hospital versus safe for Victoza titration at TCU/AL.[JH1.1]  Expectation is that use of Victoza will eliminate need for aspart and hopefully glargine, too.[JH1.2]     Will continue to follow   Discussed with pt, RN, paged to primary team.                               Interval History:   The last 24 hours progress and nursing notes reviewed.  ANISHA on CKD: Cr on admission 3.30, Cr stable today 1.38  Glucose improved by evening yesterday, following increase in aspart carb coverage.  Received first dose Tradjenta at 1700.  Fasting Bg better this morning, too.  Pt reports he slept and had a good breakfast.  Asked him to report if he has any stomach upset.[JH1.1]  [facility] counts report average just over 1000 per day and rec Ensure Plus instead of Glucerna[JH1.2]      Historical  PTA pt had been recently \"diagnosed\" with diabetes and started on glargine.  Dghtr explained pt had been given label of \"pre-diabetes\" in the past.  Also reported he eats tons of sweets.   Note A1C abnormal in summer 2016-  Appears was checked when pt hospitalized at [facility].      There is question of what level of assistance patient needs to be safe on discharge: TCU versus back to his AL (wife lives there) potentially with increased services.    Multiple daily injections not realistic for patient after discharge (neither is it going well inpatient).  Renal function and desire to avoid hypoglycemia limit options for non-insulin therapies.  Victoza required a prior authorization.  Pharmacy liaison processed and quick " "response for approval, 40.00 copay.  Tradjenta is a 40.00 copay.      Recent Labs  Lab 01/17/18  0804 01/17/18  0801 01/17/18  0136 01/16/18  2117 01/16/18  1704 01/16/18  1400 01/16/18  1138 01/16/18  0646  01/15/18  0804  01/14/18  0714  01/13/18  0730  01/12/18  0705   *  --   --   --   --   --   --  180*  --  179*  --  155*  --  149*  --  102*   BGM  --  158* 159* 209* 198* 240* 464*  --   < >  --   < >  --   < >  --   < >  --    < > = values in this interval not displayed.            Review of Systems:   See interval hx          Medications:       Active Diet Order      Regular Diet Adult     Physical Exam:  Gen: NAD , up in chair  HEENT:  mucous membranes are moist, hearing impaired (wearing aid)  Resp: Unlabored  Neuro: alert, answering appropriately, not recalling yesterday's visit  /82 (BP Location: Left arm)  Pulse 98  Temp 97.6  F (36.4  C) (Oral)  Resp 16  Ht 1.727 m (5' 8\")  Wt 76.4 kg (168 lb 6.4 oz)  SpO2 97%  BMI 25.61 kg/m2           Data:     Lab Results   Component Value Date    A1C 14.1 01/08/2018                Recent Labs   Lab Test  01/17/18   0804  01/16/18   0646   NA  141  142   POTASSIUM  4.3  4.2   CHLORIDE  110*  112*   CO2  22  22   ANIONGAP  8  8   GLC  159*  180*   BUN  13  15   CR  1.38*  1.34*   LIZ  8.9  8.1*     CBC RESULTS:   Recent Labs   Lab Test  01/17/18   0755   WBC  3.6*   RBC  3.54*   HGB  11.3*   HCT  33.6*   MCV  95   MCH  31.9   MCHC  33.6   RDW  13.7   PLT  94*       Melody SAEZ -[phone]    Diabetes Management job code 0243[JH1.1]               Revision History        User Key Date/Time User Provider Type Action    > JH1.2 1/17/2018  3:20 PM Melody Lambert APRN CNS Advanced Practice RN Addend     [N/A] 1/17/2018  3:05 PM Melody Lambert APRN CNS Advanced Practice RN Incomplete Revision     JH1.1 1/17/2018 11:02 AM Melody Lambert APRN CNS Advanced Practice RN Sign            Progress Notes by Melody Ruiz at 1/17/2018  " "1:37 PM     Author:  Mleody Ruiz Service:  Nutrition Author Type:  Registered Dietitian    Filed:  1/17/2018  1:42 PM Date of Service:  1/17/2018  1:37 PM Creation Time:  1/17/2018  1:37 PM    Status:  Signed :  Melody Ruiz (Shwetha Dietitian)         CLINICAL NUTRITION SERVICES    3 day calorie count average (1/14-1/16) = 1144 calories, 48 grams (63-66% nutritional needs)  -mainly relying on oral supplements to meet his needs    Interventions:  Continue to encourage oral supplements with meals.  Consider changing oral supplements to Ensure Plus (rather than Glucerna).  With same intake - would increase intake to meeting 75-80% of needs d/t higher calorie/protein content of Ensure Plus.  Cover CHO grams with insulin as prescribed to prevent hyperglycemia.     RD will continue to follow.     Melody Ruiz MS, RD, LD  Pager [phone][JT1.1]             Revision History        User Key Date/Time User Provider Type Action    > JT1.1 1/17/2018  1:42 PM Melody Ruiz Registered Dietitian Sign            Progress Notes by Yanci Menjivar RN at 1/17/2018  1:30 PM     Author:  Fossen, Yanci, RN Service:  Surgery Author Type:  Registered Nurse    Filed:  1/17/2018  1:36 PM Date of Service:  1/17/2018  1:30 PM Creation Time:  1/17/2018  1:30 PM    Status:  Signed :  Yanci Menjivar RN (Registered Nurse)         /69 (BP Location: Left arm)  Pulse 88  Temp 97.9  F (36.6  C) (Oral)  Resp 18  Ht 1.727 m (5' 8\")  Wt 76.4 kg (168 lb 6.4 oz)  SpO2 98%  BMI 25.61 kg/m2    Alert & oriented to self and place, confused on time and situation. HTN OVSS on RA. Denies pain/nausea. Neuro's intact. No BM today, uses urinal at bedside with intermittent incontinence. Skin care performed today as ordered. Appetite is poor, offer glucerna with meds. BG's 159 & 224. Has not been spontaneous. Pleasant and cooperative with care.     Patient has one hearing aid but unfortunately the other hearing aid is " missing.[YF1.1]       Revision History        User Key Date/Time User Provider Type Action    > YF1.1 1/17/2018  1:36 PM Yanci Menjivar, RN Registered Nurse Sign            Progress Notes by Felisha Malone at 1/17/2018  1:07 PM     Author:  Felisha Malone Service:  Spiritual Health Author Type:      Filed:  1/17/2018  1:14 PM Date of Service:  1/17/2018  1:07 PM Creation Time:  1/17/2018  1:07 PM    Status:  Signed :  Felisha Malone ()         SPIRITUAL HEALTH SERVICES  SPIRITUAL ASSESSMENT Progress Note  [facility] 7A     REFERRAL SOURCE: Chart review    Pt shared that he has difficulties hearing. I provided pt with introduction to [facility].      PLAN: I will visit Ba 1X/ week as long as he remains on 7A.     Felisha Malone   Intern  Pager [phone][DE1.1]     Revision History        User Key Date/Time User Provider Type Action    > DE1.1 1/17/2018  1:14 PM Felisha Malonelain Sign            Progress Notes by Minor Gonsalez MD at 1/17/2018  8:25 AM     Author:  Minor Gonsalez MD Service:  Hospitalist Author Type:  Physician    Filed:  1/17/2018  8:39 AM Date of Service:  1/17/2018  8:25 AM Creation Time:  1/17/2018  8:25 AM    Status:  Signed :  Minor Gonsalez MD (Physician)         [facility] [place]    Internal Medicine Progress Note - Gold Service      Assessment & Plan   Christopher Cuevas is a 89 year old male with a PMH of dementia, HTN, CAD s/p CABG, BPH and GERD who was transferred to [facility] on 1/8/2018 for confusion and concern for intracranial hemorrhage.  ICH was ruled out by imaging, and the leading differential diagnosis for his confusion is uncontrolled hyperglycemia secondary to recently diagnosed Type 2 DM.      Plans for today:  Continue monitoring glucoses with plan to try to simplify BG control regimen.  Hot pad to right neck for muscle cramp  Increase emollient application to 4 times daily.  Precede BID steroid  application with wet wraps - rash care orders clarified in EPIC orders.    # RUE choreoathetosis, witnessed fall, Metabolic Encephalopathy:   Pt. Presented to OSH after suffering a mechanical fall from standing (1/7/17) with reported head trauma and CT with concern for ICH. Family noted RU and possible RLE uncontrolled movement PTA. On admission to NICU CT with hyperdensity of the left putamen, diffuse cerebral volume loss and cerebral white matter changes consistent with chronic small vessel disease. Repeat CT with re demonstration of diffuse hyperdensity of the left putamen- likely remineralization. MRI obtained that showed asymmetric hyperdensity in the basal ganglia on CT (which is likely related to prolonged hyperglycemia) Overall the findings are consistent with non ketotic hyperglycemia which can cause choreoathetoid movements.  - continue with hyperglycemia management as below  - no delirium or agitation since 1/14 - sitter discontinued on 1/15. Mental status is still at baseline today.  - fall precautions; delirium precautions continued  - trying to maintain sleep-wake cycle -continued with trazodone 50 mg  - PT/OT involved - appreciate treatment and recommendations  - continue aspirin   - may not need neurology follow up as an outpatient if blood glucose control alone allows for stability - no chorea yesterday or today - will continue to monitor   -off sitter (from 1/15); SW helping to arrange for dispo - may be appropriate for TCU versus transfer back to living facility's memory care unit with therapy (if appropriate therapy is available)     # Hyperglycemia, DMII, HHS:    Resolved HHS;   Hba1c of 14.1  - Transitioned to basal bolus regimen - but this would be difficult to administer in his current assisted living setting - so will either need simplification of his regimen or d/c to higher level of care than assisted living.  Discussed the potential need to d/c to higher level of care if unable to  simplify his regimen, with daughter, Graciela, yesterday (1/16), and she expressed understanding. Will continue discussion with family today.  - Greatly appreciate Endocrinology consultation and recommendations.  - Will need endocrinology follow up in 2-3 months for DM and adrenal nodule which was found incidentally     #Eczema  Patient with erythematous rash on bilateral UE with slight extension to the back. Dermatology was consulted- eczematous type rash  - Closely monitor - rash quite faint but with some areas of cracking skin  - started on triamcinolone 0.1% BID and emollient as per derm recs. Complete rash care instructions: Twice daily apply soaked warm wash cloths to affected areas on arms and upper back, covered by a dry towel, and allow to sit for 10 minutes.  Remove wet wash cloths and pat dry area.  Then apply triamcinolone ointment, followed by a generous amount of emollient cream. Additional applications of emollient cream alone to be applied twice more daily.    - will need dermatology follow up as an outpatient in 1-2 months   - started on cetrizine 10 mg PO BID  -  can be d/pito if rash resolves    # Hypercalcemia: Ca on admission 10.9 --> 10.5--> 9.8. Concern for hypercalcemia which can be attributed by HCTZ, along with delirium and chorea; daughter did mention that he had weight loss in 6 months. To r/o underlying malignancy sent for CT chest, abdomen and pelvis -no real evidence of malignancy. Does have multiple non obstructing stones-urology consulted and will need outpatient follow up;  Also adrenal nodule which is an incidental findings-discussed with endocrinology about that -will follow as an outpatient   - hypercalcemia resolved ; secondary to dehydration and use of thiazides   - stop IVF today - will recheck Calcium tomorrow.  - vitamin D2 level is low - started on cholecalciferol 1000 units daily     # Pancytopenia : Platelets 139 --> 111, also noted hemoglobin and WBC is dropping  -still  could be dilutional with IV fluids  -work up initiated with vitamin b12, iron panel and ferritin, LDH, haptoglobin, d-dimer, peripheral smear and retics -which is unremarkable and consistent with anemia of chronic disease.  Peripheral smear not suggestive of MDS, though this remains on the differential.  -retics is inappropriate for the level of anemia which could be related to CKD  - continue to monitor      # HTN, CAD s/p CABG: No hx on file.    PTA ASA, lisinopril, HCTZ.   -will continue to hold HCTZ and lisinopril due to dehydration;   -started on amlodipine 5mg -remained stable with 5 mg amlodipine      # ANISHA on CKD: Cr on admission 3.30 BUN elevated to 70 BL appears  ~ 2.0. Cr down to 2.10 --> 1.6 with IVF. Suspect prerenal in setting of hypovolemia.   - also has known h/o CKD with uncontrolled diabetes  -some improvement with hydration but majority is CKD  -in euvolemic state- will d/c IV fluids   -creatinine to 1.6 which is near to baseline     # BPH: PTA Flomax.     Diet: Regular Diet Adult  Snacks/Supplements Adult: Glucerna (Adult); With Meals  Fluids: IV fluids   DVT Prophylaxis: chemical prophylaxis holding off due to low platelets   Code Status: DNR/DNI    Disposition Plan   Expected discharge: 2-3 days , recommended to TCU versus memory care center once glucose control regimen is determined and mental status continues to be at baseline.     Entered: Minor Gonsalez 01/17/2018, 8:25 AM   Information in the above section will display in the discharge planner report.      The patient's care was discussed with the Bedside Nurse , and patient.     Minor Gonsalez  Internal Medicine Staff Hospitalist Service  [facility]   Please see sticky note for cross cover information    Interval History    Alert this AM without any complaints.  Feels that he is stronger than he was when he came in.  No abnormal arm movements. Does note that he has new pain in the posterior lateral aspect of his  "right neck. \"Feels like a crick in my neck\" He noticed the pain this morning after he got out of bed, and it is exacerbated by turning his head to the left.  Pain is 4/10, going to 7/10 with head turning. No radiation. No visual changes.     Last 24 hr care team notes reviewed.   ROS:  4 point ROS including Respiratory, CV, GI and , other than that noted in the HPI, is negative.     Data reviewed today: I reviewed all medications and new labs results over the last 24 hours.   Physical Exam   Vital Signs: Temp: 97.6  F (36.4  C) Temp src: Oral BP: 137/82 Pulse: 98 Heart Rate: 98 Resp: 16 SpO2: 97 % O2 Device: None (Room air)    Weight: 167 lbs 4.8 oz  General Appearance: Alert and oriented to person, place, date and situation.  Respiratory: CTAB, no wheezes   Cardiovascular: s1s2 with no murmur   GI: soft, non tender, bowel sounds noted   Skin: presence of faint, erythematous macular rash on upper back primarily, with a few areas of flaking and skin cracking.  MSK: small area of tenderness in right trapezius, with palpable muscle knot.   Neuro: AAOx1, b/l UE and LE-4/5; non focal           Data   Data     Recent Labs  Lab 01/17/18  0755 01/16/18  0646 01/15/18  0804 01/14/18  0714   WBC 3.6* 2.7* 2.6* 3.3*   HGB 11.3* 9.7* 9.6* 10.4*   MCV 95 95 95 94   PLT 94* 79* 81* 91*   NA  --  142 142 146*   POTASSIUM  --  4.2 3.8 4.1   CHLORIDE  --  112* 114* 118*   CO2  --  22 21 21   BUN  --  15 17 17   CR  --  1.34* 1.46* 1.61*   ANIONGAP  --  8 7 8   LIZ  --  8.1* 8.0* 8.5   GLC  --  180* 179* 155*[MM1.1]          Revision History        User Key Date/Time User Provider Type Action    > MM1.1 1/17/2018  8:39 AM Minor Gonsalez MD Physician Sign            Progress Notes by Soni Dai at 1/17/2018  7:47 AM     Author:  Soni Dai Service:  Nutrition Author Type:  Nutrition Associate    Filed:  1/17/2018  7:48 AM Date of Service:  1/17/2018  7:47 AM Creation Time:  1/17/2018  7:47 AM    Status:  Signed " :  Soni Dai (Nutrition Associate)         Calorie Count  Intake recorded for: 1/16 Kcals: 1229  Protein: 53g  # Meals Recorded: 100% 2 coffees, cheeseburger, pancake with butter & syrup, 50% chicken noodle soup  # Supplements Recorded: 100% 2 Glucernas[LJ1.1]        Revision History        User Key Date/Time User Provider Type Action    > LJ1.1 1/17/2018  7:48 AM Soni Dai Nutrition Associate Sign            Progress Notes by Minor Gonsalez MD at 1/16/2018  8:16 AM     Author:  Minor Gonsalez MD Service:  Internal Medicine Author Type:  Physician    Filed:  1/16/2018  5:54 PM Date of Service:  1/16/2018  8:16 AM Creation Time:  1/16/2018  8:16 AM    Status:  Signed :  Minor Gonsalez MD (Physician)         [facility]    Internal Medicine Progress Note - Gold Service      Assessment & Plan   Christopher Cuevas is a 89 year old male[MM1.1] with a PMH of dementia, HTN, CAD s/p CABG, BPH and GERD who was transferred to [facility][MM1.2] on 1/8/2018[MM1.1] for confusion and concern for intracranial hemorrhage.  ICH was ruled out by imaging, and the leading differential diagnosis for his confusion is uncontrolled hyperglycemia secondary to recently diagnosed Type 2 DM.[MM1.2]      Plans for today[MM1.1]:  Starting Tradjenta and monitoring glucoses with plan to try to simplify BG control regimen.[MM1.2]    # RUE choreoathetosis, witnessed fall,[MM1.1] Metabolic Encephalopathy:[MM1.2]   Pt. Presented to OSH after suffering a mechanical fall from standing (1/7/17) with reported head trauma and CT with concern for ICH. Family noted RU and possible RLE uncontrolled movement PTA. On admission to NICU CT with hyperdensity of the left putamen, diffuse cerebral volume loss and cerebral white matter changes consistent with chronic small vessel disease. Repeat CT with re demonstration of diffuse hyperdensity of the left putamen- likely remineralization[MM1.1].[MM1.2]  MRI obtained that showed asymmetric hyperdensity in the basal ganglia on CT (which is likely related to prolonged hyperglycemia) Overall the findings[MM1.1] are consistent with[MM1.2] non ketotic hyperglycemia which can cause choreoathetoid movements[MM1.1].[MM1.2]  - continue with hyperglycemia management as below  -[MM1.1] no delirium or agitation today. Mental status at prior baseline today.[MM1.2]  - fall precautions; delirium precautions[MM1.1] continued[MM1.2]  - trying to maintain sleep-wake cycle -continued with trazodone 50 mg  - PT/OT involved   -[MM1.1] continu[MM1.2]e aspirin   -[MM1.1] may not[MM1.2] need neurology follow up as an outpatient[MM1.1] if[MM1.2] blood glucose control[MM1.1] alone allows for stability - no chorea today - will continue to monitor[MM1.2]   -off sitter (from 1/15); SW helping to arrange for[MM1.1] dispo - worked well with PT today - may be appropriate for TCU versus transfer back to living facility's memory care unit.[MM1.2]      # Hyperglycemia, DMII[MM1.1],[MM1.2] HHS:    Resolved[MM1.1] HHS[MM1.2];   Hba1c of 14.1[MM1.1]  - T[MM1.2]ransitioned to basal bolus regimen[MM1.1] - but this would be difficult to administer in his current assisted living setting - so will either need simplification of his regimen or d/c to higher level of care than assisted living.  Discussed the potential need to d/c to higher level of care if unable to simplify his regimen, with daughter, Graciela, today, and she expressed understanding.   - Greatly appreciate[MM1.2] Endocrinology[MM1.1] consultation and recommendations.  -[MM1.2] Will need endocrinology follow up in 2-3 months for DM and adrenal nodule which was found incidentally     #Eczema[MM1.1]  Per nursing, p[MM1.2]atient with erythematous rash on bilateral UE with slight extension to the back.[MM1.1] D[MM1.2]ermatology was consulted- eczematous type rash  - Closely monitor[MM1.1] - rash quite faint today[MM1.2]  - started on triamcinolone  0.1% BID and emollient as per derm recs- also instructed nursing to put saran wrap with triamcinolone   - will need dermatology follow up as an outpatient in 1-2 months   - started on cetrizine 10 mg PO BID[MM1.1]  -[MM1.2]  can be d/pito if rash[MM1.1] resolves[MM1.2]      # Hypercalcemia: Ca on admission 10.9 --> 10.5--> 9.8. Concern for hypercalcemia which can be attributed by HCTZ, along with delirium and chorea; daughter did mention that he had weight loss in 6 months[MM1.1].[MM1.2] To r/o underlying malignancy sent for CT chest, abdomen and pelvis -no real evidence of malignancy[MM1.1].[MM1.2] Does have multiple non obstructing stones-urology consulted and will need outpatient follow up;  Also adrenal nodule which is an incidental findings-discussed with endocrinology about that -will follow as an outpatient   -[MM1.1] hypercalcemia[MM1.2] resolved ; secondary to dehydration and use of thiazides   -[MM1.1] stop IVF today - will recheck Calcium tomorrow.[MM1.2]  - vitamin D2 level is low - started on cholecalciferol 1000 units daily     # Pancytopenia : Platelets 139 --> 111, also noted hemoglobin and WBC is dropping  -still could be dilutional with IV fluids  -work up initiated with vitamin b12, iron panel and ferritin, LDH, haptoglobin, d-dimer, peripheral smear and retics -which is unremarkable and consistent with anemia of chronic disease[MM1.1].  Peripheral smear not suggestive of MDS, though this remains on the differential.[MM1.2]  -retics is inappropriate for the level of anemia which could be related to CKD[MM1.1]  - continue to monitor[MM1.2]        # HTN, CAD s/p CABG: No hx on file.    PTA ASA, lisinopril, HCTZ.   -will continue to hold HCTZ and lisinopril due to dehydration;   -started on amlodipine 5mg -remained stable with 5 mg amlodipine      # ANISHA on CKD: Cr on admission 3.30 BUN elevated to 70 BL appears  ~ 2.0. Cr down to 2.10 --> 1.6 with IVF. Suspect prerenal in setting of hypovolemia.   -  also has known h/o CKD with uncontrolled diabetes  -some improvement with hydration but majority is CKD  -in euvolemic state- will d/c IV fluids   -creatinine to 1.6 which is near to baseline     # BPH: PTA Flomax.        Diet: Regular Diet Adult  Calorie Counts  Snacks/Supplements Adult: Glucerna (Adult); With Meals  Fluids: IV fluids   DVT Prophylaxis: chemical prophylaxis holding off due to low platelets   Code Status: DNR/DNI    Disposition Plan   Expected discharge: 2-3 days , recommended to[MM1.1] TCU versus [facility] center[MM1.2] once[MM1.1] glucose control regimen is determined and mental status continues to be at baseline.[MM1.2]     Entered: Minor Gonsalez 01/16/2018, 8:16 AM   Information in the above section will display in the discharge planner report.      The patient's care was discussed with the Bedside Nurse[MM1.1] , patient[MM1.2] and daughter[MM1.1] Graciela[MM1.2]     Minor Gonsalez  Internal Medicine Staff Hospitalist Service  [facility]   Please see sticky note for cross cover information    Interval History[MM1.1]    Alert this AM without any complaints.  Feels that he is stronger than he was when he came in.  No abnormal arm movements.[MM1.2]    Last 24 hr care team notes reviewed.   ROS:  4 point ROS including Respiratory, CV, GI and , other than that noted in the HPI, is negative.     Data reviewed today: I reviewed all medications, new labs and imaging results over the last 24 hours. I personally reviewed CT head with subtle asymmetric diffuse hyperdensity   Physical Exam   Vital Signs: Temp: 97.9  F (36.6  C) Temp src: Oral BP: 136/67   Heart Rate: 70 Resp: 16 SpO2: 96 % O2 Device: None (Room air)    Weight: 163 lbs 14.4 oz  General Appearance:[MM1.1] Alert and oriented to person, place, date and situation.[MM1.2]  Respiratory:[MM1.1] CTAB, no wheezes[MM1.2]   Cardiovascular: s1s2 with no murmur   GI: soft, non tender, bowel sounds noted   Skin: presence of  maculopapular rash in back and upper extremity  Neuro: AAOx1, b/l UE and LE-4/5; non focal           Data   Data     Recent Labs  Lab 01/16/18  0646 01/15/18  0804 01/14/18  0714   WBC 2.7* 2.6* 3.3*   HGB 9.7* 9.6* 10.4*   MCV 95 95 94   PLT 79* 81* 91*    142 146*   POTASSIUM 4.2 3.8 4.1   CHLORIDE 112* 114* 118*   CO2 22 21 21   BUN 15 17 17   CR 1.34* 1.46* 1.61*   ANIONGAP 8 7 8   LIZ 8.1* 8.0* 8.5   * 179* 155*[MM1.1]          Revision History        User Key Date/Time User Provider Type Action    > MM1.2 1/16/2018  5:54 PM Minor Gonsalez MD Physician Sign     MM1.1 1/16/2018  8:16 AM Minor Gonsalez MD Physician             Progress Notes by Felisha Malone at 1/16/2018  4:03 PM     Author:  Felisha Malone Service:  Spiritual Health Author Type:      Filed:  1/16/2018  4:06 PM Date of Service:  1/16/2018  4:03 PM Creation Time:  1/16/2018  4:03 PM    Status:  Signed :  Felisha Malone ()         SPIRITUAL HEALTH SERVICES  SPIRITUAL ASSESSMENT Progress Note  [facility] ([facility] 7A     REFERRAL SOURCE: Chart review    Attempted visit. Pt was sleeping and unavailable.     PLAN: Will attemept to visit [facility] again this week and 1X/week as long as he remains on 7A.    Felisha Malone   Intern  Pager [phone][DE1.1]       Revision History        User Key Date/Time User Provider Type Action    > DE1.1 1/16/2018  4:06 PM Felisha Malonein Sign            Progress Notes by Melody Lambert APRN CNS at 1/16/2018  2:22 PM     Author:  Melody Lambert APRN CNS Service:  Endocrinology Author Type:  Advanced Practice RN    Filed:  1/16/2018  3:36 PM Date of Service:  1/16/2018  2:22 PM Creation Time:  1/16/2018  2:22 PM    Status:  Signed :  Melody Lambert APRN CNS (Advanced Practice RN)                              Diabetes Consult Daily  Progress Note          Assessment/Plan:                          Christopher Cuevas is a 89 year old male with[JH1.1]  "recent diagnosis of type 2 diabetes,[JH1.2] history of dementia, hypertension, CAD s/p CABG,  GERD, and a recent fall who was admitted to the[JH1.1] M[JH1.2]ICU (1/7/2018) from OSH with concern for ICH. CT images suggestive of calcification vs ICH vs related to hyperglycemia.    [JH1.1]    Pt's fasting glucose improved to target range, but post-prandial hyperglycemia remains problematic.  Timing of aspart has been difficult.[JH1.2]      Plan  - continue glargine 17 units daily am[JH1.1] for now[JH1.2]  - aspart prandial insulin increase from 1 unit per 15 grams carb to 1 per 10, give promptly at end of eating[JH1.1]  - start linagliptin 5 mg daily, first dose now[JH1.2]  - aspart 1 unit per 40 > 140 before meals and > 200 HS  - monitor glucose before meals, HS and 0200  - having good glucose control is important, but due to age and underlying dementia, target 125-250 likely appropriate[JH1.1]    - SW, CC, and pharmacy liaison help sought for information on how to proceed with prior authorization for Victoza when pt headed to facility  If able to determine Victoza access, using Victoza alone would be preferable to glargine plus linagliptin.[JH1.2]     Will continue to follow   Discussed with pt, RN, primary team                                   Interval History:   The last 24 hours progress and nursing notes reviewed.  ANISHA on CKD: Cr on admission 3.30, Cr steadily improving    PTA pt had been recently \"diagnosed\" with diabetes and started on glargine.  Dghtr explained pt had been given label of \"pre-diabetes\" in the past.  Also reported he eats tons of sweets.   Note A1C abnormal in summer 2016-  Appears was checked when [facility].    Pt eating meals (two pancakes this morning) and taking Glucerna.  Sometime supp with meal, sometimes as a snack.  Today bfast delayed d/t OT session.  RN estimates pt got aspart half hour after start of eating.  BG was then checked nearly immediately after end of " "meal and found to be >400.  There is question of what level of assistance patient needs to be safe on discharge: TCU versus back to his AL (wife lives there) potentially with increased services.    Multiple daily injections not realistic for patient after discharge (neither is it going well inpatient).  Renal function and desire to avoid hypoglycemia limit options for non-insulin therapies.  Victoza requires a prior authorization.  PharmD was uncertain what the process would be for pt needing PA who is discharging to TCU/AL.  Tradjenta is a 40.00 copay.      Recent Labs  Lab 01/16/18  1400 01/16/18  1138 01/16/18  0646 01/16/18  0153 01/15/18  2155 01/15/18  1754 01/15/18  1405  01/15/18  0804  01/14/18  0714  01/13/18  0730  01/12/18  0705  01/11/18  0806   GLC  --   --  180*  --   --   --   --   --  179*  --  155*  --  149*  --  102*  --  218*   * 464*  --  226* 244* 231* 283*  < >  --   < >  --   < >  --   < >  --   < >  --    < > = values in this interval not displayed.            Review of Systems:   See interval hx          Medications:       Active Diet Order      Regular Diet Adult     Physical Exam:  Gen: NAD   HEENT:  mucous membranes are moist  Resp: Unlabored  Neuro: sleeping  /69 (BP Location: Left arm)  Pulse 66  Temp 97.8  F (36.6  C) (Oral)  Resp 18  Ht 1.727 m (5' 8\")  Wt 75.9 kg (167 lb 4.8 oz)  SpO2 98%  BMI 25.44 kg/m2           Data:     Lab Results   Component Value Date    A1C 14.1 01/08/2018                  Recent Labs   Lab Test  01/16/18   0646  01/15/18   0804   NA  142  142   POTASSIUM  4.2  3.8   CHLORIDE  112*  114*   CO2  22  21   ANIONGAP  8  7   GLC  180*  179*   BUN  15  17   CR  1.34*  1.46*   LIZ  8.1*  8.0*     CBC RESULTS:   Recent Labs   Lab Test  01/16/18   0646   WBC  2.7*   RBC  3.08*   HGB  9.7*   HCT  29.1*   MCV  95   MCH  31.5   MCHC  33.3   RDW  13.8   PLT  79*     Liver Function Studies -   Recent Labs   Lab Test  01/09/18   0643   PROTTOTAL  5.6* "   ALBUMIN  3.0*   BILITOTAL  0.6   ALKPHOS  56   AST  22   ALT  18[JH1.1]     I spent a total of 35 minutes bedside and on the inpatient unit managing the glycemic care of Christopher Cuevas. Over 50% of my time on the unit was spent counseling the patient and/or coordinating care regarding options for outpatient glucose management given pt's cognitive limitations and desire to return to prior level of care.  See note for details.[JH1.2]  Melody SAEZ [facility] [phone]    Diabetes Management job code 0243[JH1.1]               Revision History        User Key Date/Time User Provider Type Action    > JH1.2 1/16/2018  3:36 PM Melody Lambert APRN CNS Advanced Practice RN Sign     JH1.1 1/16/2018  2:22 PM Melody Lambert APRN CNS Advanced Practice RN             Progress Notes by Mehdi Rivera MSW at 1/16/2018  9:25 AM     Author:  Mehdi Rivera MSW Service:  Social Work Author Type:      Filed:  1/16/2018  2:22 PM Date of Service:  1/16/2018  9:25 AM Creation Time:  1/16/2018  9:25 AM    Status:  Signed :  Mehdi Rivera MSW ()         Social Work Services Progress Note    Hospital Day: 9  Date of Initial Social Work Evaluation:  1/8/2018  Collaborated with:  Pts daughters, TCU's.    Data:  Pt needs TCU. Is now off bedside attendant.     Intervention[GJ1.1]/Assessment[GJ1.2]:  SW resent referrals to the following facilities now that pt is off bedside attendant.    1. [facility] ([facility]). P: [phone], F: [phone].[GJ1.1] Declined pt, they feel like he will need longer term rehab. They also cannot accommodate pts memory issues/current cognitive deficits.[GJ1.3]  2. [facility] ([place]). P: [phone], F: [phone].[GJ1.1] Declined due to pts memory issues. They do not feel comfortable with the level of dementia he has. They are recommending memory care TCU.[GJ1.4]  3. [facility]. P: [phone], F:  [phone].[GJ1.1] Referral sent and left message.  4. [facility] ([facility]): Referral sent.[GJ1.2]    SW also received message from pts daughter Sophy who asked that SW coordinate with pts other daughter Graciela today, P: [phone].    SW also left message for [facility] where pt lives to see if they contract with any facilities for TCU.    Plan:    Anticipated Disposition:[GJ1.1] TCU.[GJ1.2]    Barriers to d/c plan:[GJ1.1]  Medical stability.[GJ1.2]    Follow Up:[GJ1.1]  SW will continue to follow for discharge needs.    JANICE Bolton, LGSW  7A   Ph: [phone], Pager: [phone][GJ1.2]           Revision History        User Key Date/Time User Provider Type Action    > GJ1.2 1/16/2018  2:22 PM Mehdi Rivera MSW  Sign     GJ1.4 1/16/2018 11:36 AM Mehdi Rivera MSW       GJ1.3 1/16/2018 10:55 AM Mehdi Rivera MSW       GJ1.1 1/16/2018  9:25 AM Mehdi Rivera MSW              Progress Notes by Alejandra Pino MD at 1/15/2018  3:30 PM     Author:  Alejandra Pino MD Service:  Dermatology Author Type:  Physician    Filed:  1/16/2018  8:03 AM Date of Service:  1/15/2018  3:30 PM Creation Time:  1/15/2018  3:30 PM    Status:  Signed :  Alejandra Pino MD (Physician)         Brief Progress Note:    S:  Rash appears better per RN. Not bothersome to patient. No very pruritic. No burning or pain.    O:  Focused exam of lower extremities upper extremities and trunk performed:  Significant for:  -Scaly pink plaque lateral R leg with eroded papules and linear excoriations  -Pink scaly patches in lace-like pattern on the below the knees bilaterally  -Improved erythema of right upper extremity  -R abdomen with faint pink patch    A/P:  1. Eczematous rash- Morphologically appears to be 2/2 eczema craquele with possible component of nutritional deficiency. Still with no classic morphology for drug rash. Relatively spares L side of body.  For persistent plaques would step up topical steroid to a more potent one and start dilute bleach soaks. If doesn't improve in a couple days or acutely worsens, would consider biopsy.  -Change triamcinolone 0.1% ointment to Lidex 0.05% ointment BID to areas affected by the rash  -Start bleach soaks to apply after steroid ointment and keep for 2 hours (use 2 teaspoons of non-scented bleach to a gallon of water or alternatively soak towel in Dakin's solution)  -Continue Vanicream  -Optimize nutritional status     2. History of NMSC/?melanoma  -F/u with dermatology outpatient    Kiara Zelaya MD  Internal Medicine-Dermatology, PGY-2    Discussed with Dr. Pino[NK1.1]    I have discussed this patient and agree with Dr. Zelaya's documentation and plan of care. I have reviewed and amended the resident's note above. The documentation accurately reflects my diagnoses, treatment and follow-up plans.     Alejandra Pino MD  Dermatology Staff[CB1.1]       Revision History        User Key Date/Time User Provider Type Action    > CB1.1 1/16/2018  8:03 AM Alejandra Pino MD Physician Sign     NK1.1 1/15/2018  5:39 PM Kiara Zelaya MD Resident Sign            Progress Notes by Soni Dai at 1/16/2018  7:26 AM     Author:  Soni Dai Service:  Nutrition Author Type:  Nutrition Associate    Filed:  1/16/2018  7:28 AM Date of Service:  1/16/2018  7:26 AM Creation Time:  1/16/2018  7:26 AM    Status:  Signed :  Soni Dai (Nutrition Associate)         Calorie Counts  Intake recorded for: 1/15  Kcals: 1078  Protein: 46g  # Meals Recorded: 100% tomato soup, orange juice, 2 coffees, 50% ham & cheese omelet, 25% grapes, whole milk  # Supplements Recorded: 100% 2.5 Glucernas[LJ1.1]        Revision History        User Key Date/Time User Provider Type Action    > LJ1.1 1/16/2018  7:28 AM Soni Dai Nutrition Associate Sign            Progress Notes by Cinthia Mckeon MD  at 1/15/2018  3:06 PM     Author:  Cinthia Mckeon MD Service:  General Medicine Author Type:  Physician    Filed:  1/15/2018  3:54 PM Date of Service:  1/15/2018  3:06 PM Creation Time:  1/15/2018  3:06 PM    Status:  Signed :  Cinthia Mckeon MD (Physician)         [facility], [place]    Internal Medicine Progress Note - Gold Service      Assessment & Plan   Christopher Cuevas is a 89 year old male admitted on 1/8/2018. He has PMH of Dementia, HTN, CAD s/p CABG, GERD, BPH and recent witnessed fall who was admitted to the NICU (1/7/17) from OSH with concern for ICH on CT. Patient admitted to NICU for further evaluation. CT images suggestive of calcification vs ICH vs related to hyperglycemia. Patient transferred out of the ICU and transferred to medicine for further care.      Plans for today;  - patient in am sleepy after seroquel- will try off seroquel   -agitation and restless improved   -delirium is getting better  -switched to insulin basal bolus regimen   -d/c sitter today     # RUE choreoathetosis, witnessed fall, Concern for ICH in patient with Dementia:  #Altered mental status   Pt. Presented to OSH after suffering a mechanical fall from standing (1/7/17) with reported head trauma and CT with concern for ICH. Family noted RU and possible RLE uncontrolled movement PTA. On admission to NICU CT with hyperdensity of the left putamen, diffuse cerebral volume loss and cerebral white matter changes consistent with chronic small vessel disease. Repeat CT with re demonstration of diffuse hyperdensity of the left putamen- likely remineralization, though f/u with MRI recommended.   Overall the findings related to non ketotic hyperglycemia which can cause choreoathetoid movements   MRI obtained that showed asymmetric hyperdensity in the basal ganglia on CT (which is likely related to prolonged hyperglycemia)  - continue with hyperglycemia management as below  -still delirious but  improving[SS1.1] ; not agitated anymore[SS1.2]   -fall precautions; delirium precautions   -trying to maintain sleep-wake cycle -continued with trazodone 50 mg -plan to go up if not working today  -PT/OT involved   -metabolic workup so far negative  -resume aspirin   -[SS1.1]plan to d/c sitter today[SS1.2]   -will need neurology follow up as an outpatient to see how much improvement he will have just with blood glucose control  -ongoing chorea which will take time so holding off on starting medications for that   -discussed with neurology again  and will take atleast 1 month to see real improvement with symptoms despite glucose control so will need follow up to see how he does with chorea[SS1.1]                       -his agitation and restlessness is much better and chorea is also better                     -due to restless ness he was started on seroquel 12.5 mg PO daily ; I think it was more of time than meds than helped with agitation and chorea plan to observe how he does without seroquel which was d/pito today ; if worsening chorea or restlessness seroquel can  be started again                    -off sitter (from 1/15); SW helping to arrange for TCU  Needs follow up with neurology in 1 month to see how he does in terms of choreoathetosis movement[SS1.2]      # Hyperglycemia, DMII     HHS:    Resolved; suspect this is an ongoing and chronic issue  Hba1c of 14.1  Resolving and transitioned to basal bolus regimen; glargine 1[SS1.1]7[SS1.2] units[SS1.1] along with novolog 1 unit per 15 gm prandially and sliding scale[SS1.2]   Endocrinology will follow[SS1.1]     Will need endocrinology follow up in 2-3 months for DM and adrenal nodule which was found incidentally[SS1.2]         #Eczema  Per nursing, patient with erythematous rash on bilateral UE with slight extension to the back. - Closely monitor  - started on triamcinolone 0.1% BID and emollient as per derm recs- also instructed nursing to put saran wrap with  triamcinolone   -dermatology was consulted- eczematous type rash   -will need dermatology follow up as an outpatient[SS1.1] in 1-2 months[SS1.2]   -started on cetrizine 10 mg PO BID[SS1.1]  -continue for few more days and can be d/pito if rash improving[SS1.2]       # Hypercalcemia: Ca on admission 10.9 --> 10.5--> 9.8.   -resolved ; secondary to dehydration and use of thiazides   - Continue IVF  ---ionized calcium and calcium level was within normal range after hydration; pending PTH level  -vitamin D2 level is low - started on cholecalciferol 1000 units daily     Concern for hypercalcemia which can be attributed by HCTZ, along with delirium and chorea; daughter did mention that he had weight loss in 6 months   To r/o underlying malignancy sent for CT chest, abdomen and pelvis -no real evidence of malignancy   Does have multiple non obstructing stones-urology consulted[SS1.1] and will need outpatient follow up[SS1.3];  Also adrenal nodule which is an incidental findings-discussed with endocrinology about that -will follow[SS1.1] as an outpatient[SS1.3]      # Pancytopenia : Platelets 139 --> 111, also noted hemoglobin and WBC is dropping  -still could be dilutional with IV fluids  -work up initiated with vitamin b12, iron panel and ferritin, LDH, haptoglobin, d-dimer, peripheral smear and retics -which is unremarkable and consistent with anemia of chronic disease   -so far negative and no concern for hemolysis  -retics is inappropriate for the level of anemia which could be related to CKD        # HTN, CAD s/p CABG: No hx on file.    PTA ASA, lisinopril, HCTZ.   -will continue to hold HCTZ and lisinopril due to dehydration;   -started on amlodipine 5mg[SS1.1] -remained stable with 5 mg amlodipine[SS1.3]      # ANISHA on CKD: Cr on admission 3.30 BUN elevated to 70 BL appears  ~ 2.0. Cr down to 2.10 --> 1.6 with IVF. Suspect prerenal in setting of hypovolemia.   - also has known h/o CKD with uncontrolled diabetes  -some  improvement with hydration but majority is CKD  -in euvolemic state- will d/c IV fluids   -creatinine to 1.6 which is near to baseline     # BPH: PTA Flomax.        Diet: Regular Diet Adult  Calorie Counts  Snacks/Supplements Adult: Glucerna (Adult); With Meals  Fluids: IV fluids   DVT Prophylaxis: chemical prophylaxis holding off due to low platelets   Code Status: DNR/DNI    Disposition Plan   Expected discharge:[SS1.1] 2-3 days[SS1.3] , recommended to transitional care unit once off sitter and mental status is better[SS1.1] ; d/c sitter today and mental status clearing[SS1.3]      Entered: Cinthia Mckeon 01/15/2018, 3:06 PM   Information in the above section will display in the discharge planner report.      The patient's care was discussed with the Bedside Nurse and daughter     alpesh Mckeon  Internal Medicine Staff Hospitalist Service  [facility]  Pager: [phone]   Please see sticky note for cross cover information    Interval History    Improved than before ; stable and chorea improved on right side       Last 24 hr care team notes reviewed.   ROS:  4 point ROS including Respiratory, CV, GI and , other than that noted in the HPI, is negative.     Data reviewed today: I reviewed all medications, new labs and imaging results over the last 24 hours. I personally reviewed CT head with subtle asymmetric diffuse hyperdensity   Physical Exam   Vital Signs: Temp: 98.6  F (37  C) Temp src: Oral BP: 129/63 Pulse: 66 Heart Rate: 72 Resp: 16 SpO2: 98 % O2 Device: None (Room air)    Weight: 163 lbs 14.4 oz  General Appearance: Pt confused but awake and alert   Respiratory: b/l equal breath sounds with no added sounds   Cardiovascular: s1s2 with no murmur   GI: soft, non tender, bowel sounds noted   Skin: presence of maculopapular rash in back and upper extremity ; better in terms of redness in right upper extremity  Neuro: AAOx1, b/l UE and LE-4/5; non focal           Data   Data     Recent Labs  Lab  01/15/18  0804 01/14/18  0714 01/13/18  0730  01/09/18  0643   WBC 2.6* 3.3* 3.7*  < > 5.9   HGB 9.6* 10.4* 10.8*  < > 11.5*   MCV 95 94 93  < > 93   PLT 81* 91* 84*  < > 102*    146* 146*  < > 137   POTASSIUM 3.8 4.1 3.7  < > 3.5   CHLORIDE 114* 118* 118*  < > 107   CO2 21 21 17*  < > 20   BUN 17 17 17  < > 50*   CR 1.46* 1.61* 1.44*  < > 2.10*   ANIONGAP 7 8 11  < > 11   LIZ 8.0* 8.5 8.6  < > 9.5   * 155* 149*  < > 147*   ALBUMIN  --   --   --   --  3.0*   PROTTOTAL  --   --   --   --  5.6*   BILITOTAL  --   --   --   --  0.6   ALKPHOS  --   --   --   --  56   ALT  --   --   --   --  18   AST  --   --   --   --  22   < > = values in this interval not displayed.[SS1.1]       Revision History        User Key Date/Time User Provider Type Action    > SS1.3 1/15/2018  3:54 PM Cinthia Mckeon MD Physician Sign     SS1.2 1/15/2018  3:11 PM Cinthia Mckeon MD Physician      SS1.1 1/15/2018  3:06 PM Cinthia Mckeon MD Physician             Progress Notes by Jordi Kothari APRN CNP at 1/15/2018  8:30 AM     Author:  Jordi Kothari APRN CNP Service:  Endocrinology Author Type:  Nurse Practitioner    Filed:  1/15/2018  2:42 PM Date of Service:  1/15/2018  8:30 AM Creation Time:  1/15/2018  8:30 AM    Status:  Signed :  Jordi Kothari APRN CNP (Nurse Practitioner)                              Diabetes Consult Daily  Progress Note          Assessment/Plan:                          Christopher Cuevas is a 89 year old male with history of dementia, hypertension, CAD s/p CABG,  GERD, and a recent fall who was admitted to the NICU (1/7/2018) from OSH with concern for ICH. CT images suggestive of calcification vs ICH vs related to hyperglycemia.      Type 2 diabetes: poorly controlled with A1C > 14% ( 1/7/2018)  ANISHA on CKD: Cr on admission 3.30, Cr trending downward to 1.65 today      Plan  - increase lantus from  15 units to 17 units daily am   - Novolog prandial insulin from  1 unit per 15   -  "Novolog 1 unit per 40 > 140 before meals and > 200 HS  -monitor glucose before meals, HS and 0200  - having good glucose control is important, but due to age and underlying dementia, would run blood sugars higher to prevent hypoglycemia.      Will continue to follow                                    Interval History:   The last 24 hours progress and nursing notes reviewed.[JM1.1]  Blood sugars at 0125, 196 and fasting 179/172  Blood sugars elevated, suspect 2/2 timing of prandial insulin  glucose 283, is a post prandial, RN tested glucose before snack of 30 grams) 2 units was given for snack  Appetite is variable, but is averaging 50 grams of CHO with most meals[JM1.2]      Recent Labs  Lab 01/15/18  0125 01/14/18  2246 01/14/18  1700 01/14/18  1317 01/14/18  0853 01/14/18  0714 01/14/18  0200  01/13/18  0730  01/12/18  0705  01/11/18  0806  01/10/18  1049  01/09/18  0643   GLC  --   --   --   --   --  155*  --   --  149*  --  102*  --  218*  --  126*  --  147*   * 259* 314* 392* 166*  --  146*  < >  --   < >  --   < >  --   < >  --   < >  --    < > = values in this interval not displayed.            Review of Systems:   See interval hx          Medications:       Active Diet Order      Regular Diet Adult     Physical Exam:  Gen: NAD   HEENT:  mucous membranes are moist  Resp: Unlabored  Neuro:[JM1.1] sleeping[JM1.2]  /62 (BP Location: Left arm)  Pulse 66  Temp 98.7  F (37.1  C) (Oral)  Resp 16  Ht 1.727 m (5' 8\")  Wt 74.3 kg (163 lb 14.4 oz)  SpO2 97%  BMI 24.92 kg/m2           Data:     Lab Results   Component Value Date    A1C 14.1 01/08/2018              CBC RESULTS:   Recent Labs   Lab Test  01/14/18 0714   WBC  3.3*   RBC  3.32*   HGB  10.4*   HCT  31.3*   MCV  94   MCH  31.3   MCHC  33.2   RDW  13.8   PLT  91*     Recent Labs   Lab Test  01/14/18   0714  01/13/18   0730   NA  146*  146*   POTASSIUM  4.1  3.7   CHLORIDE  118*  118*   CO2  21  17*   ANIONGAP  8  11   GLC  155*  149*   BUN "  17  17   CR  1.61*  1.44*   LIZ  8.5  8.6     Liver Function Studies -   Recent Labs   Lab Test  01/09/18   0643   PROTTOTAL  5.6*   ALBUMIN  3.0*   BILITOTAL  0.6   ALKPHOS  56   AST  22   ALT  18     No results found for: TARYN Kothari [facility] [phone]  Diabetes Management job code 0243[JM1.1]               Revision History        User Key Date/Time User Provider Type Action    > JM1.2 1/15/2018  2:42 PM Jordi Kothari APRN CNP Nurse Practitioner Sign     EDIL1.1 1/15/2018  8:30 AM Jordi Kothari APRN CNP Nurse Practitioner             Progress Notes by Soni Dai at 1/15/2018  8:03 AM     Author:  Soni Dai Service:  Nutrition Author Type:  Nutrition Associate    Filed:  1/15/2018  8:04 AM Date of Service:  1/15/2018  8:03 AM Creation Time:  1/15/2018  8:03 AM    Status:  Signed :  Soni Dai (Nutrition Associate)         Calorie Counts  Intake recorded for: 1/14 Kcals: 1124  Protein: 44g  # Meals Recorded: 100% coffee, 2 fruit cups, grapes, 25% white toast, less than 25% 2 vaughn strips  # Supplements Recorded: 100% 4 Glucernas[LJ1.1]     Revision History        User Key Date/Time User Provider Type Action    > LJ1.1 1/15/2018  8:04 AM Soni Dai Nutrition Associate Sign                  Procedure Notes     No notes of this type exist for this encounter.      Progress Notes - Therapies (Notes from 01/15/18 through 01/18/18)     No notes of this type exist for this encounter.

## 2018-01-08 NOTE — PLAN OF CARE
Problem: Patient Care Overview  Goal: Plan of Care/Patient Progress Review  PT 4A:  Limited evaluation completed, treatment initiated    Discharge Planner PT   Patient plan for discharge: Return to [facility]  Current status: Increasing delirium making exam difficult.  Moving RUE/RLE more than L hemibody, unable to assess focal strengths.  Min A sit<>stand x4 reps, min A bed mobility.  Safety is currently very impaired.  Barriers to return to prior living situation: Cognition, requiring close supervision at all times from physically capable caregiver  Recommendations for discharge: TCU or return to [facility] with HHPT or temporarily increased services   Rationale for recommendations:  Need to better assess functional mobility and cognitive performance once delirium clears       Entered by: Yahaira Saucedo 01/08/2018 1:48 PM

## 2018-01-08 NOTE — PLAN OF CARE
Problem: Patient Care Overview  Goal: Plan of Care/Patient Progress Review  Discharge Planner SLP   Patient plan for discharge: patient not able to state due to confusion  Current status: Clinical dysphagia examination completed this AM per MD order. The patient was confused but pleasant for this exam. He fed himself regular textures, puree applesauce, and thin liquids by straw with slow but adequate oral phase, no significant oral residue, and no direct signs/symptoms of aspiration.  Suspect the patient's swallowing function is at baseline. However, AMS may impact safety. Recommend a regular diet and thin liquids with supervision and staff assisting with self selection of softer items. SLP will f/u for safe swallowing.  Barriers to return to prior living situation: AMS, weakness  Recommendations for discharge: TCU  Rationale for recommendations: patient may reach SLP goals prior to hospital discharge       Entered by: Gissel Shepard 01/08/2018 12:24 PM

## 2018-01-08 NOTE — H&P
[facility], [place]      Neurology Stroke Admission    Patient Name: Christopher Cuevas  : 1928 MRN#: [id number]    STROKE DATA    Stroke Code:  Stroke code not activated.  Time patient seen:  2018 0230  Last known normal (pt's baseline):  2 weeks ago       TPA treatment:  Not given due to head CT consistent with bleed.      Stroke Scales      National Institutes of Health Stroke Scale (at presentation)   NIHSS done at:  time patient seen      Score    Level of consciousness:  (1)   Not alert; arousable w/ minor stim to obey/answer/respond    LOC questions:  (2)   Answers neither question correctly    LOC commands:  (1)   Performs one task correctly    Best gaze:  (0)   Normal    Visual:  (0)   No visual loss    Facial palsy:  (0)   Normal symmetrical movements    Motor arm (left):  (0)   No drift    Motor arm (right):  (1)   Drift    Motor leg (left):  (0)   No drift    Motor leg (right):  (0)   No drift    Limb ataxia:  (1)   Present in one limb    Sensory:  (0)   Normal- no sensory loss    Best language:  (1)   Mild to moderate aphasia    Dysarthria:  (1)   Mild to moderate dysarthria    Extinction and inattention:  (0)   No abnormality        NIHSS Total Score:  8        ICH Score (within 6 hrs of admission/before surgery)   Done at: 0200 18    ICH Score Tool Patients Score   Age ? 80 years 1 point    GCS score  3-4   5-12   13-15    2 point  1 point  0 point    Hematoma volume, cm 3 ? 30 1 point    Intraventricular extension 1 point    Infratentorial location 1 point    Patient s ICH Score (0-6) = 1              Dysphagia Screen  Time of screenin2018 0200  Screening results: Failed screening - strict NPO pending SLP evaluation     ASSESSMENT & PLAN BY PROBLEM     Work-up for Intracerebral Hemorrhage  His initial head CT was suggestive of ICH in left putamen.  Follow up head CT has showed stability of the hyperdensity and it is likely that it represents  calcification.  We will continue to treat as a bleed until we can further characterize the lesion with MRI.    Acute Hemorrhagic Stroke Plan  - ICH Score: 1  - Admit to Neuro ICU  - Neurochecks  - Systolic BP Goal: < 140  - Euthermia, Euglycemia  - Head of bed elevated  - Telemetry, EKG  - Imaging: CT, CT angio head/neck  - Bedside Glucose Monitoring  - A1c, Troponin x 3  - PT/OT/SLP  - PM&R  - Stroke Education   - Holding aspirin    During initial physical assessment, the plan of care was discussed and developed with family.  Plan of care includes: work up for ICH.    Patient was admitted via direct admit.    The patient will be admitted to the Neuro Critical Care/Stroke team..     Other Medical Problems:  #ANISHA on CKD  Creatinine 3.3 yesterday at OSH.  Creatinine 2.18 in 2017.  We will avoid contrast.  BUN 70 suggests some of this may be prerenal.  Received 1 liter bolus at outside hospital.  Will given 100 ml/hr NS.    #Hyperglycemia  Glucose in 400s at outside hospital.  B-hydroxybutyrate elevated.  Started on insulin ggt.  Per daughters hemoglobin A1C was around 14 recently.    -Insulin ggt      Fluids/Electrolytes/Nutrition  0.9% sodium chloride @ 100 mL/hr  Avoid hypotonic fluids.    Nutrition: None      Prophylaxis            For VTE Prevention:  - pneumatic compression device      Code Status  DNR / DNI per conversation with daughters    HPI  Christopher Cuevas is a 89 year old male with h/o HTN, CAD s/p CABG, and dementia who was transferred from OSH for ICH on CT.  He presented to outside hospital after a fall during which he struck his head.  He was found to have blood glucose to 565 in the field.  He is a poor historian so his daughters helped provide the history.  They report that he has seemed more tired the past few weeks, beginning on Eduardo.  About five days ago it was noted that he seemed to have weakness of the right upper extremity.  Two days later he began to have eccentric movements of his right  upper and lower extremities.  They note that he was previously fairly independent, and he lives at an Assisted Living Facility.     Pertinent Past Medical/Surgical History  Past Medical History:   Diagnosis Date     Chronic kidney disease      Dementia      Dementia      Diabetes (H)      Gastroesophageal reflux disease      Hyperkalemia      Hyperlipidemia      Hypertension        No past surgical history on file.    Medications:   Prescriptions Prior to Admission   Medication Sig Dispense Refill Last Dose     imiquimod (ALDARA) 5 % cream Apply  topically three times a week.        mupirocin (BACTROBAN) 2 % ointment Use 2 times a day to affected area. 22 g 1    .    Allergies: No Known Allergies.    Family History: No family history on file..    Social History:   Social History   Substance Use Topics     Smoking status: Former Smoker     Smokeless tobacco: Not on file      Comment: quit 45 years ago     Alcohol use Not on file   .    ROS:  Unable to perform due to mental status    PHYSICAL EXAMINATION  Vital Signs:  B/P: 94/54,  T: 97.6,  P: Data Unavailable,  R: Data Unavailable    General:  patient lying in bed without any acute distress    HEENT:  normocephalic/atraumatic, only saw  Cardio:  RRR  Pulmonary:  no respiratory distress  Abdomen:  bowel sounds present  Extremities:  no edema  Skin:  intact     Neurologic  Mental Status:  agitated, answers many questions inappropriately  Cranial Nerves:  visual fields intact, PERRL, EOMI with normal smooth pursuit, facial sensation intact and symmetric, facial movements symmetric, shoulder shrug strong bilaterally, tongue protrusion midline, hearing diminished  Motor: Strength 5/5 throughout with significant   Reflexes:  2+ and symmetric throughout  Sensory:  Intact to light touch bilaterally upper and lower  Coordination:  Unable participate  Station/Gait:  Unable to test    Labs  Labs and Imaging reviewed and used in developing the plan; pertinent results included.      Lab Results   Component Value Date     (H) 01/07/2018       The patient was discussed with the fellow, Dr. Patel.  The staff is Dr. Granger.    Bradley Patiño MD  Pager: [phone]

## 2018-01-08 NOTE — ED PROVIDER NOTES
"  History     Chief Complaint   Patient presents with     Hyperglycemia     Fall     hit head, witnessed-wilbur,l, no LOC     Agitation     dementia hx, increased anxiety/restlessness and confusion with high sugars over the past week     HPI    Christopher Cuevas is a 89 year old male with a history of dementia, diabetes, skin cancer, and heart bypass who presents by EMS from assisted living to the emergency department with his daughters for evaluation of agitation and movement problem. Most information is provided by the patient's daughter's due to the patient's mental state. The patient has been recently getting care for hyperglycemia, with blood sugar levels reportedly as high as 500. A1C from 6 months ago was 7.1 and recent A1C was 14.3. He has been started on insulin and his dose was increased today. Over the past few weeks the patient has had an increase in confusion and disorientation from baseline as well as new symptoms of agitation. He has reportedly been arguing with his wife, which is very abnormal.  Days ago his daughter took him to the doctor and she felt like his right arm was weak and she had to prop it up , 2 days ago he noticed that he seemed to have uncontrolled movement of his right upper extremity and possibly right lower extremity as well.  This has continued.  Currently he complains of no headache.   He allegedly had a \"witnessed\" mechanical fall today, according to the paramedics but they could not tell us to the witness was in the witness is not here and so we have no report of any of these details.  Patient does not recall this.  He is reported to have struck the posterior aspect of his head but did not lose consciousness as far as we know but the history is quite murky and he is certainly unreliable.  He denies any fevers, headaches, vision changes, weakness, numbness, difficulty breathing, or pain.  He denies neck pain or back pain.  He denies hip pain.  He has not attempted to ambulate. He " also denies any difficulty with his movement while his arm is moving uncontrollably. His current medications 7/13/17 are lisinopril 20 mg daily, pravastatin sodium 20 mg daily, Tamsulosin Hcl 0.4 days mg daily, Hydrochlorothiazide 24 mg daily, Aspirin 81 mg daily, and Trazodone 50 mg as needed.    I reviewed his records through care everywhere and his recent laboratory evaluation and he has been having steadily worsening renal function with creatinine that was 1.86 in December above 3 a few days ago.  Blood sugars have been as high as 660.  He has had fluctuating sodiums but none of them worrisomely low      Problem List:    Patient Active Problem List    Diagnosis Date Noted     Neoplasm of uncertain behavior of skin 02/27/2013     Priority: Medium     Basal cell carcinoma, scalp/neck 02/27/2013     Priority: Medium     SCC (squamous cell carcinoma), face 02/27/2013     Priority: Medium        Past Medical History:    Past Medical History:   Diagnosis Date     Chronic kidney disease      Dementia      Dementia      Diabetes (H)      Gastroesophageal reflux disease      Hyperkalemia      Hyperlipidemia      Hypertension        Past Surgical History:    No past surgical history on file.    Family History:    No family history on file.    Social History:  Marital Status:   [2]  Social History   Substance Use Topics     Smoking status: Former Smoker     Smokeless tobacco: Not on file      Comment: quit 45 years ago     Alcohol use Not on file        Medications:      imiquimod (ALDARA) 5 % cream   mupirocin (BACTROBAN) 2 % ointment       Review of Systems  All other systems are reviewed and are negative system review is limited due to dementia and he is a quite unreliable historian    Physical Exam   BP: 131/75  Pulse: 113  Heart Rate: 136  Temp: 98.3  F (36.8  C)  Resp: 18  Weight: 86.2 kg (190 lb)  SpO2: 94 %      Physical Exam  Nursing note and vitals were reviewed.  Constitutional: Agitated 89-year-old male  thrashing about on the exam table and exhibiting choreoathetotic movements of his right upper extremity and possibly right lower extremity.  He is not oriented to to time or place.  He does recognize his daughters.  He does respond to questions and seems to answer appropriately but it is uncertain if his answers are reliable.  His speech is fluent.  HEENT: There is an abrasion on the occiput of the scalp without any hematoma and no tenderness or deformity.  No other signs of trauma.  EOMI. PERRLA.  Oropharynx shows ecchymoses on the right lateral aspect of the tongue suggestive of tongue biting which is worrisome for possible seizure.    Neck: He moves his neck freely through full pain range of motion with no apparent discomfort.  There is no visible swelling or deformity, ecchymosis, erythema.  He complains of some tenderness when I percuss over the lower cervical spine.  The remainder of his spine examination is without areas of tenderness on palpation or percussion, abnormal spinal curvature, areas of erythema, ecchymosis, swelling or deformity.  Cardiovascular: Heart rhythm is regular.  Heart rate is tachycardic.  No murmur is heard.  Pulmonary/Chest: Breathing is unlabored.  Breath sounds are clear and equal bilaterally.  No signs of chest trauma.  No areas of erythema, ecchymosis, swelling, deformity, subcutaneous emphysema, crepitus.  Abdomen: Soft, nontender, no HSM or masses rebound or guarding.  Musculoskeletal: Chorea athetotic movement of right upper extremity. Right shoulder weakness, able to lift against gravity but not with resistance.  There are no additional signs of trauma in the extremities.  I am able to move his hips and knees and ankles through a full range of motion with no discomfort.  There is no pain or tenderness with compression of the pelvis.   Neurological: Choreoathetotic movements of the right upper extremity.  Possibly the right lower extremity.  He is weak against resistance with  abduction of the right shoulder.  Detailed motor testing was not possible because of his movement disorder.  No obvious cranial nerve deficits.  Motor tone was normal except in the right upper extremity where it was difficult to test.  Sensation was not tested.  Gait was not tested.  Skin: Diffuse erythema of right forearm and hand.   Genitalia: Phallus is normal in appearance.  He was straight cathed for urine that were 200 cc of urine in his bladder.    ED Course     ED Course     Procedures               EKG Interpretation:      Interpreted by Gadiel Ko  Time reviewed: 22;03   Symptoms at time of EKG: Agitation  Rhythm: sinus tachycardia  Rate: 138  Axis: Left axis deviation  Ectopy: none  Conduction: Right bundle branch block  ST Segments/ T Waves: No ST-T wave changes  Q Waves: none  Comparison to prior: No old EKG available    Clinical Impression: Sinus tachycardia with bifascicular block    Critical Care time:  was 75 minutes for this patient excluding procedures.               Results for orders placed or performed during the hospital encounter of 01/07/18 (from the past 24 hour(s))   CBC with platelets differential   Result Value Ref Range    WBC 8.1 4.0 - 11.0 10e9/L    RBC Count 4.25 (L) 4.4 - 5.9 10e12/L    Hemoglobin 13.5 13.3 - 17.7 g/dL    Hematocrit 37.3 (L) 40.0 - 53.0 %    MCV 88 78 - 100 fl    MCH 31.8 26.5 - 33.0 pg    MCHC 36.2 31.5 - 36.5 g/dL    RDW 12.3 10.0 - 15.0 %    Platelet Count 139 (L) 150 - 450 10e9/L    Diff Method Automated Method     % Neutrophils 81.4 %    % Lymphocytes 11.9 %    % Monocytes 6.0 %    % Eosinophils 0.4 %    % Basophils 0.1 %    % Immature Granulocytes 0.2 %    Absolute Neutrophil 6.6 1.6 - 8.3 10e9/L    Absolute Lymphocytes 1.0 0.8 - 5.3 10e9/L    Absolute Monocytes 0.5 0.0 - 1.3 10e9/L    Absolute Eosinophils 0.0 0.0 - 0.7 10e9/L    Absolute Basophils 0.0 0.0 - 0.2 10e9/L    Abs Immature Granulocytes 0.0 0 - 0.4 10e9/L   Comprehensive metabolic panel   Result  Value Ref Range    Sodium 132 (L) 133 - 144 mmol/L    Potassium 4.1 3.4 - 5.3 mmol/L    Chloride 96 94 - 109 mmol/L    Carbon Dioxide 22 20 - 32 mmol/L    Anion Gap 14 3 - 14 mmol/L    Glucose 472 (H) 70 - 99 mg/dL    Urea Nitrogen 70 (H) 7 - 30 mg/dL    Creatinine 3.30 (H) 0.66 - 1.25 mg/dL    GFR Estimate 18 (L) >60 mL/min/1.7m2    GFR Estimate If Black 21 (L) >60 mL/min/1.7m2    Calcium 10.9 (H) 8.5 - 10.1 mg/dL    Bilirubin Total 0.7 0.2 - 1.3 mg/dL    Albumin 3.9 3.4 - 5.0 g/dL    Protein Total 7.4 6.8 - 8.8 g/dL    Alkaline Phosphatase 74 40 - 150 U/L    ALT 21 0 - 70 U/L    AST 20 0 - 45 U/L   Ketone Beta-Hydroxybutyrate Quantitative   Result Value Ref Range    Ketone Quantitative 1.1 (H) 0.0 - 0.6 mmol/L   Glucose by meter   Result Value Ref Range    Glucose 481 (H) 70 - 99 mg/dL   CT Head w/o Contrast   Result Value Ref Range    Radiologist flags Possible intracranial hemorrhage (AA)     Narrative    CT OF THE HEAD WITHOUT CONTRAST 1/7/2018 10:46 PM     COMPARISON: None.    HISTORY: Confusion; right upper extremity choreoathetosis.     TECHNIQUE: 5 mm thick axial CT images of the head were acquired  without IV contrast material.    FINDINGS: There is relative hyperdensity of the left putamen compared  to the right putamen that could represent asymmetric basal ganglia  calcification. However, parenchymal hemorrhage involving the left  putamen cannot be excluded. Short-term repeat evaluation head CT  recommended to document stability.     There is moderate diffuse cerebral volume loss. There are extensive  confluent areas of decreased density in the cerebral white matter  bilaterally that are consistent with sequela of chronic small vessel  ischemic disease. The ventricles and basal cisterns are within normal  limits in configuration given the degree of cerebral volume loss.   There is no midline shift. There are no extra-axial fluid collections.    No intracranial mass or recent infarct.    The visualized  paranasal sinuses are well-aerated. There is no  mastoiditis. There are no fractures of the visualized bones.      Impression    IMPRESSION:   1. Hyperdensity of the left putamen as discussed above. Recent  intracranial hemorrhage cannot be excluded. Short-term repeat head CT  recommended to document stability.  2. Diffuse cerebral volume loss and cerebral white matter changes  consistent with chronic small vessel ischemic disease.     [Critical Result: Possible intracranial hemorrhage]    Finding was identified on 1/7/2018 10:49 PM.     Gadiel Ko was contacted by Dr. Billings on 1/7/2018 10:51 PM and  verbalized understanding of the critical result.     Radiation dose for this scan was reduced using automated exposure  control, adjustment of the mA and/or kV according to patient size, or  iterative reconstruction technique.      PITER BILLINGS MD   Blood gas venous   Result Value Ref Range    Ph Venous 7.35 7.32 - 7.43 pH    PCO2 Venous 45 40 - 50 mm Hg    PO2 Venous 19 (L) 25 - 47 mm Hg    Bicarbonate Venous 25 21 - 28 mmol/L    Base Deficit Venous 1.2 mmol/L   CT Cervical Spine w/o Contrast    Narrative    CT CERVICAL SPINE WITHOUT CONTRAST   1/8/2018 12:04 AM     HISTORY: Fall. Hit head.    COMPARISON: None.    TECHNIQUE: Without intravenous contrast, helical sections were  acquired through the cervical spine from the skull base through the  bottom of the T4 vertebral body. Coronal and sagittal reconstructions  were generated. Radiation dose for this scan was reduced using  automated exposure control, adjustment of the mA and/or kV according  to the patient's size, or iterative reconstruction technique.    FINDINGS: No visualized acute fracture of the cervical spine. Normal  alignment of the cervical spine. Mild to moderate degenerative changes  throughout the cervical spine. No prevertebral soft tissue swelling.  Atherosclerotic calcification in bilateral carotid arteries.      Impression    IMPRESSION: No  visualized acute fracture or malalignment of the  cervical spine.       Medications   0.9% sodium chloride BOLUS (0 mLs Intravenous ED Infusing on Admission/transfer 1/8/18 0029)     Followed by   0.9% sodium chloride infusion (1,000 mLs Intravenous Not Given 1/7/18 8183)   LORazepam (ATIVAN) injection 0.5 mg (0.5 mg Intravenous Given 1/7/18 2156)   insulin regular (HumuLIN R/NovoLIN R) injection 10 Units (10 Units Subcutaneous Given 1/7/18 2225)       20:56 PM Patient assessed. Course of care outlined.    Assessments & Plan (with Medical Decision Making)     89-year-old male brought in from assisted living because of alteration in behavior and abnormal movements as well as recent increased blood sugars and a history of a reported fall.  On examination I do not see evidence of significant rheumatic consequences from the fall.  Most significant problem appears to be the movement disorder affecting the right upper and lower extremities.  On unenhanced CT there is hyperdensity in the left putamen consistent with his clinical syndrome and suggestive of hemorrhage in this part of his brain.  By report of the daughters this may have occurred about 2 days ago.  In addition he has had diabetes that has been extremely poorly controlled as well as steadily rising creatinine over the past several weeks and months.  I suspect the rise in creatinine is in part related to dehydration from the out-of-control diabetes.  He is not in DKA.  Blood pH and bicarb are normal.  Ketones are slightly elevated.  He received fluid hydration with a liter of crystalloid and 10 units of subcutaneous regular insulin in the emergency department.     He will require specialty care from neurology and others.  I spoke with Dr. Jerome in neuro critical care at [facility] who agreed to accept him in transfer.  His heart rate improved significantly with hydration in the emergency department and his behavior improved with a dose of lorazepam  0.5 mg IV.    I discussed all these findings with the patient and his daughters.  They are in agreement with the plan for transfer to the [place] for multidisciplinary and neuro intensive care.    I have reviewed the nursing notes.         Discharge Medication List as of 1/8/2018 12:37 AM          Final diagnoses:   Hemorrhage in putamen (H)   Type 2 diabetes mellitus with hyperglycemia, with long-term current use of insulin (H)   Acute kidney injury (H)   Fall, initial encounter   Laceration of tongue, initial encounter     This document serves as a record of the services and decisions personally performed and made by Gadiel Ko MD. It was created on HIS/HER behalf by Denis Shaikh, a trained medical scribe. The creation of this document is based the provider's statements to the medical scribe.  Denis Shaikh 8:56 PM 1/7/2018    Provider:   The information in this document, created by the medical scribe for me, accurately reflects the services I personally performed and the decisions made by me. I have reviewed and approved this document for accuracy prior to leaving the patient care area.  Gadiel Ko MD 8:56 PM 1/7/2018 1/7/2018   [facility] EMERGENCY DEPARTMENT     Gadiel Ko MD  01/08/18 0039

## 2018-01-08 NOTE — PROGRESS NOTES
"   01/08/18 1403   Quick Adds   Type of Visit Initial PT Evaluation   Living Environment   Lives With spouse   Living Arrangements assisted living   Home Accessibility no concerns   Living Environment Comment Pt unable to provide all info, no family present to confirm.   Self-Care   Usual Activity Tolerance good   Current Activity Tolerance moderate   Equipment Currently Used at Home walker, rolling   Activity/Exercise/Self-Care Comment Pt unable to provide all info, no family present to confirm.   Functional Level Prior   Ambulation 1-->assistive equipment   Transferring 1-->assistive equipment   Toileting 0-->independent   Bathing 0-->independent  (\"sometimes\" has help)   Dressing 0-->independent   Fall history within last six months yes   Number of times patient has fallen within last six months 1  (fall hitting head 2 weeks ago)   Prior Functional Level Comment Pt unreliable historian, need to confirm above info   General Information   Onset of Illness/Injury or Date of Surgery - Date 01/08/17   Referring Physician Bradley Patiño MD   Patient/Family Goals Statement \"I want to leave now!\"   Pertinent History of Current Problem (include personal factors and/or comorbidities that impact the POC) 88yo M admitted from OSH found to have ICH of L putamen.  PMH:  HTN, CAD s/p CABG, and dementia. He presented to outside hospital after a fall during which he struck his head.  He was found to have blood glucose to 565 in the field.     Precautions/Limitations fall precautions   Cognitive Status Examination   Orientation person;place   Level of Consciousness alert;agitated;combative   Follows Commands and Answers Questions 50% of the time;unable to follow multi-step instructions   Personal Safety and Judgment impaired;at risk behaviors demonstrated;impulsive   Memory impaired   Cognitive Comment Delirious.  Pt becoming increasingly agitated during evaluation, requiring x2 caregiver to calm and provide safe " environment   Pain Assessment   Patient Currently in Pain No   Integumentary/Edema   Integumentary/Edema no deficits were identifed   Posture    Posture Forward head position;Protracted shoulders   Range of Motion (ROM)   ROM Comment WFL all extremities gross functional movements   Strength   Strength Comments unable to assess formally.  moves RUE/RLE more spontaneously and greater volition than L hemibody.  Able to strongly resist therapist facilitation techniques therefore with adequate functional strength all extremities   Bed Mobility   Bed Mobility Comments CGA-min A   Transfer Skills   Transfer Comments min A   Gait   Gait Comments Not safe to assess   Balance   Balance Comments Impaired needs CGA in standing, trunks way and variable NATASHA   Sensory Examination   Sensory Perception Comments unable to assess   General Therapy Interventions   Planned Therapy Interventions balance training;bed mobility training;gait training;neuromuscular re-education;transfer training;strengthening;home program guidelines;progressive activity/exercise   Clinical Impression   Criteria for Skilled Therapeutic Intervention yes, treatment indicated   PT Diagnosis impaired functional mobility   Influenced by the following impairments AMS, impaired balance   Functional limitations due to impairments decreased (I) bed mobility, transfers, gait   Clinical Presentation Evolving/Changing   Clinical Presentation Rationale Requiring 1:1 supervision for safety 2/2 delirium.  Ongoing brain imaging workup   Clinical Decision Making (Complexity) Moderate complexity   Therapy Frequency` 5 times/week   Predicted Duration of Therapy Intervention (days/wks) 1/15/2018   Anticipated Discharge Disposition Transitional Care Facility;Home with Home Therapy   Risk & Benefits of therapy have been explained Yes   Patient, Family & other staff in agreement with plan of care Yes   Clinical Impression Comments Pt will benefit from skilled PT to increase safety  "and (I) with functional mobility.  Delirium will play big role in ability to participate in meaningful therapy, will monitor closely.  Pending functional performance once mental status improves, may benefit fro TCU stay before return to [facility] AM-PAC  \"6 Clicks\" V.2 Basic Mobility Inpatient Short Form   1. Turning from your back to your side while in a flat bed without using bedrails? 3 - A Little   2. Moving from lying on your back to sitting on the side of a flat bed without using bedrails? 3 - A Little   3. Moving to and from a bed to a chair (including a wheelchair)? 3 - A Little   4. Standing up from a chair using your arms (e.g., wheelchair, or bedside chair)? 3 - A Little   5. To walk in hospital room? 2 - A Lot   6. Climbing 3-5 steps with a railing? 1 - Total   Basic Mobility Raw Score (Score out of 24.Lower scores equate to lower levels of function) 15   Total Evaluation Time   Total Evaluation Time (Minutes) 8     "

## 2018-01-08 NOTE — IP AVS SNAPSHOT
"    [address] [facility]: [phone]                                              INTERAGENCY TRANSFER FORM - PHYSICIAN ORDERS   2018                    Hospital Admission Date: 2018  ILANA RUANO   : 1928  Sex: Male        Attending Provider: Minor Gonsalez MD     Allergies:  No Known Allergies    Infection:  None   Service:  NEURO ICU    Ht:  1.727 m (5' 8\")   Wt:  75.8 kg (167 lb 1.6 oz)   Admission Wt:  72.9 kg (160 lb 11.5 oz)    BMI:  25.41 kg/m 2   BSA:  1.91 m 2            Patient PCP Information     Provider PCP Type    Juan Gongora MD General      ED Clinical Impression     Diagnosis Description Comment Added By Time Added    Choreiform movement [R25.8] Choreiform movement [R25.8]  Wallace Granger MD 2018  2:49 PM    Hyperglycemia [R73.9] Hyperglycemia [R73.9]  Wallace Granger MD 2018  2:49 PM      Hospital Problems as of 2018              Priority Class Noted POA    ICH (intracerebral hemorrhage) (H) Medium  2018 Yes      Non-Hospital Problems as of 2018              Priority Class Noted    Neoplasm of uncertain behavior of skin Medium  2013    Basal cell carcinoma, scalp/neck Medium  2013    SCC (squamous cell carcinoma), face Medium  2013      Code Status History     Date Active Date Inactive Code Status Order ID Comments User Context    This patient has a current code status but no historical code status.         Medication Review      UNREVIEWED medications. Ask your doctor about these medications        Dose / Directions Comments    ASPIRIN PO        Dose:  81 mg   Take 81 mg by mouth daily   Refills:  0        HYDROCHLOROTHIAZIDE PO        Dose:  25 mg   Take 25 mg by mouth daily   Refills:  0        imiquimod 5 % cream   Commonly known as:  ALDARA        Apply  topically three times a week.   Refills:  0        LISINOPRIL PO        Dose:  20 mg   Take 20 mg by mouth daily   Refills:  0        mupirocin 2 % " ointment   Commonly known as:  BACTROBAN   Used for:  Impetigo        Use 2 times a day to affected area.   Quantity:  22 g   Refills:  1        PRAVACHOL PO        Dose:  20 mg   Take 20 mg by mouth every evening   Refills:  0        TAMSULOSIN HCL PO        Dose:  0.4 mg   Take 0.4 mg by mouth daily   Refills:  0        TRAZODONE HCL PO        Dose:  25-50 mg   Take 25-50 mg by mouth At Bedtime   Refills:  0                Your next 10 appointments already scheduled     Feb 16, 2018  8:30 AM CST   (Arrive by 8:15 AM)   New Renal Calculi with Tatiana Bill MD   [facility] ([facility])    [address]  [address] [facility] [facility] [address] [phone]

## 2018-01-08 NOTE — CONSULTS
"Social Work: Assessment with Discharge Plan    Patient Name:  Christopher Cuevas  :  1928  Age:  89 year old  MRN:  [id number]  Risk/Complexity Score:  Filed Complexity Screen Score: 4  Completed assessment with:  Pt's daughter-Cassandra, chart review, attended rounds     Presenting Information   Reason for Referral:  Stroke consult  Date of Intake:  2018  Referral Source:  Consult  Decision Maker:  Patient at baseline.  Alternate Decision Maker:  Pt's daughter Sophy states that she is listed as health care agent in pt's HCD.   Health Care Directive: None filed (or scanned yet?), but daughter Sophy says she has a copy with her. SW to follow up on this.   Living Situation:  Assisted Living:  [facility] in [place], [place] with wife.  Previous Functional Status:  Pt and wife receive \"level 2\" services at their [facility], which includes 1hr/day assistance with bathing, meds, escorting to meals. Pt has a walker, but does not use it.   Patient and family understanding of hospitalization:  Restorative   Cultural/Language/Spiritual Considerations:  English-speaking. Pt's daughter reports that both pt and wife appear to have mild dementia.   Adjustment to Illness:  Pt unable to engage at this time, but per daughter, he's frustrated with being in the hospital. Daughter states they're taking things one day at a time and hope to have a better understanding of what's going on as the results of tests/scans come back.     Physical Health  Reason for Admission:    1. Choreiform movement    2. Hyperglycemia      Services Needed/Recommended:  TCU vs home with home care     Mental Health/Chemical Dependency  Diagnosis:  None listed in Problem List. Did not address.   Support/Services in Place:  NA   Services Needed/Recommended:  NA     Support System  Significant relationship at present time:  Wife   Family of origin is available for support:  Pt's daughters live in [place] (Sophy) and [place] (Graciela). " Both are involved and supportive. Pt also has a son that Sophy reports is cognitively delayed and lives in a group home, but visits pt and his wife every Sunday.    Other support available:  Son-in-laws, assisted living facility   Gaps in support system:  None identified.   Patient is caregiver to:  None     Provider Information   Primary Care Physician:  Lucas Thompson   [phone]   Clinic:  [facility] [address] [place] [address] / [facility] *      :  Unknown     Financial   Income Source:  Did not address.   Financial Concerns:  Did not address.   Insurance:    Payor/Plan Subscriber Name Rel Member # Group #   [employer] - [employer]OWS  [id number] [facility] [place] [address]       Discharge Plan   Patient and family discharge goal:  Pt's daughter thinks pt will be resistant to TCU and their hope is that he'll be able to receive home care. He has been to [facility] in [place] in the past. Family was not impressed with this facility, but would be okay with pt returning if it was the only option.   Provided education on discharge plan:  Yes, started discussion.   Barriers to discharge:  Medical stability, pt currently on 1:1     Discharge Recommendations   Anticipated Disposition:  TCU vs home with home care  Transportation Needs:  TBD   Name of Transportation Company and Phone:  TBD     Additional comments   Met with pt's daughter Sophy and her  Emilie at the bedside. Introduced self and role of SW. Pt was unable to engage, as he was sleepy and disoriented. Obtained info above. Sophy discussed pt and wife's recent transition to assisted living and that it has been a good change for them. She also discussed the stress involved with the transition, as pt's son is mentally challenged and she's unsure of his understanding of pt's hospitalization and the change in his visiting routine. They're trying to navigate this and are helping with transportation for pt's son  to visit each Sunday. Discussed potential d/c plans for pt. They're aware that pt may need rehab, but are hopeful he will be able to return home with home care, as they don't think he will want to go to rehab. Both Sophy and her  were pleasant a and receptive to SW. Provided SW contact info should needs arise.     JANCIE Izquierdo, [facility]  ICU Float   Pager: [phone]  [email or web address]     NO LETTER

## 2018-01-08 NOTE — PLAN OF CARE
Problem: Patient Care Overview  Goal: Plan of Care/Patient Progress Review  Pt admitted via EMS from [facility] for neuro monitoring s/p possible bleed/malformation on head CT and neuro changes.   Checking neuros q 1 hour per  Neurology resident.   IAN fully neurological status due to pt ability to cooperate, very hard of hearing and without hearing aides, and mostly only oriented to himself. Moving all extremities, RUE and RLE involuntarily move almost continuously while awake, move infrequently when asleep, has followed commands in all extremiites: lifted both legs off the bed, squeezed bilateral hands, will stick out his tongue midline and smile- no droop noted.   Obtained head CT after pt calmed with 0.5mg IV Ativan. Results pending.   BP stable, HR sinus 90s to sinus tach 120s, afebrile, see vitals flow sheet for details.   No resp distress, clear lungs, O2 sats stable on room air.   NPO overnight, occasional hiccups noted, passing flatus, smear BM when brief changed.   Incontinent of urine but has voided in urinal x2 as well. Bladder scan is less than 10ml and no distension of bladder noted.   Pt's daughters saw pt after admission and were updated by myself, Felipe Neurosurgery MD, and Ashish neurology MD. Family will be back to see patient today after getting some sleep.  Morning labs still need to be drawn- pt was agitated and thrashing when lab was available overnight- lab notified of need to draw.  Pt updated on all cares throughout the shift.  Possible MRI today- pt cooperation will be a challenge.  Please see charting and flowsheets for detailed information.

## 2018-01-09 ENCOUNTER — APPOINTMENT (OUTPATIENT)
Dept: SPEECH THERAPY | Facility: CLINIC | Age: 83
DRG: 637 | End: 2018-01-09
Attending: PSYCHIATRY & NEUROLOGY
Payer: COMMERCIAL

## 2018-01-09 LAB
ALBUMIN SERPL-MCNC: 3 G/DL (ref 3.4–5)
ALP SERPL-CCNC: 56 U/L (ref 40–150)
ALT SERPL W P-5'-P-CCNC: 18 U/L (ref 0–70)
ANION GAP SERPL CALCULATED.3IONS-SCNC: 11 MMOL/L (ref 3–14)
AST SERPL W P-5'-P-CCNC: 22 U/L (ref 0–45)
BACTERIA SPEC CULT: NO GROWTH
BASOPHILS # BLD AUTO: 0 10E9/L (ref 0–0.2)
BASOPHILS NFR BLD AUTO: 0.3 %
BILIRUB SERPL-MCNC: 0.6 MG/DL (ref 0.2–1.3)
BUN SERPL-MCNC: 50 MG/DL (ref 7–30)
CALCIUM SERPL-MCNC: 9.5 MG/DL (ref 8.5–10.1)
CHLORIDE SERPL-SCNC: 107 MMOL/L (ref 94–109)
CO2 SERPL-SCNC: 20 MMOL/L (ref 20–32)
COPATH REPORT: NORMAL
CREAT SERPL-MCNC: 2.1 MG/DL (ref 0.66–1.25)
CRP SERPL-MCNC: 8.3 MG/L (ref 0–8)
DIFFERENTIAL METHOD BLD: NORMAL
EOSINOPHIL # BLD AUTO: 0.1 10E9/L (ref 0–0.7)
EOSINOPHIL NFR BLD AUTO: 1 %
ERYTHROCYTE [DISTWIDTH] IN BLOOD BY AUTOMATED COUNT: 13 % (ref 10–15)
GFR SERPL CREATININE-BSD FRML MDRD: 30 ML/MIN/1.7M2
GLUCOSE BLDC GLUCOMTR-MCNC: 113 MG/DL (ref 70–99)
GLUCOSE BLDC GLUCOMTR-MCNC: 115 MG/DL (ref 70–99)
GLUCOSE BLDC GLUCOMTR-MCNC: 120 MG/DL (ref 70–99)
GLUCOSE BLDC GLUCOMTR-MCNC: 127 MG/DL (ref 70–99)
GLUCOSE BLDC GLUCOMTR-MCNC: 136 MG/DL (ref 70–99)
GLUCOSE BLDC GLUCOMTR-MCNC: 143 MG/DL (ref 70–99)
GLUCOSE BLDC GLUCOMTR-MCNC: 143 MG/DL (ref 70–99)
GLUCOSE BLDC GLUCOMTR-MCNC: 152 MG/DL (ref 70–99)
GLUCOSE BLDC GLUCOMTR-MCNC: 160 MG/DL (ref 70–99)
GLUCOSE BLDC GLUCOMTR-MCNC: 162 MG/DL (ref 70–99)
GLUCOSE BLDC GLUCOMTR-MCNC: 173 MG/DL (ref 70–99)
GLUCOSE BLDC GLUCOMTR-MCNC: 180 MG/DL (ref 70–99)
GLUCOSE BLDC GLUCOMTR-MCNC: 194 MG/DL (ref 70–99)
GLUCOSE BLDC GLUCOMTR-MCNC: 205 MG/DL (ref 70–99)
GLUCOSE BLDC GLUCOMTR-MCNC: 205 MG/DL (ref 70–99)
GLUCOSE BLDC GLUCOMTR-MCNC: 212 MG/DL (ref 70–99)
GLUCOSE BLDC GLUCOMTR-MCNC: 220 MG/DL (ref 70–99)
GLUCOSE BLDC GLUCOMTR-MCNC: 257 MG/DL (ref 70–99)
GLUCOSE BLDC GLUCOMTR-MCNC: 90 MG/DL (ref 70–99)
GLUCOSE BLDC GLUCOMTR-MCNC: 95 MG/DL (ref 70–99)
GLUCOSE BLDC GLUCOMTR-MCNC: 97 MG/DL (ref 70–99)
GLUCOSE BLDC GLUCOMTR-MCNC: 98 MG/DL (ref 70–99)
GLUCOSE SERPL-MCNC: 147 MG/DL (ref 70–99)
HCT VFR BLD AUTO: 33.9 % (ref 40–53)
HGB BLD-MCNC: 11.5 G/DL (ref 13.3–17.7)
IMM GRANULOCYTES # BLD: 0 10E9/L (ref 0–0.4)
IMM GRANULOCYTES NFR BLD: 0.2 %
LYMPHOCYTES # BLD AUTO: 1.1 10E9/L (ref 0.8–5.3)
LYMPHOCYTES NFR BLD AUTO: 18.3 %
Lab: NORMAL
MCH RBC QN AUTO: 31.5 PG (ref 26.5–33)
MCHC RBC AUTO-ENTMCNC: 33.9 G/DL (ref 31.5–36.5)
MCV RBC AUTO: 93 FL (ref 78–100)
MONOCYTES # BLD AUTO: 0.6 10E9/L (ref 0–1.3)
MONOCYTES NFR BLD AUTO: 9.7 %
NEUTROPHILS # BLD AUTO: 4.1 10E9/L (ref 1.6–8.3)
NEUTROPHILS NFR BLD AUTO: 70.5 %
PLATELET # BLD AUTO: 102 10E9/L (ref 150–450)
POTASSIUM SERPL-SCNC: 3.5 MMOL/L (ref 3.4–5.3)
PROT SERPL-MCNC: 5.6 G/DL (ref 6.8–8.8)
RBC # BLD AUTO: 3.65 10E12/L (ref 4.4–5.9)
RETICS # AUTO: 65.5 10E9/L (ref 25–95)
RETICS/RBC NFR AUTO: 1.8 % (ref 0.5–2)
SODIUM SERPL-SCNC: 137 MMOL/L (ref 133–144)
SPECIMEN SOURCE: NORMAL
TSH SERPL DL<=0.005 MIU/L-ACNC: 1.99 MU/L (ref 0.4–4)
WBC # BLD AUTO: 5.9 10E9/L (ref 4–11)

## 2018-01-09 PROCEDURE — 80053 COMPREHEN METABOLIC PANEL: CPT | Performed by: PHYSICIAN ASSISTANT

## 2018-01-09 PROCEDURE — 25000125 ZZHC RX 250: Performed by: STUDENT IN AN ORGANIZED HEALTH CARE EDUCATION/TRAINING PROGRAM

## 2018-01-09 PROCEDURE — 25000131 ZZH RX MED GY IP 250 OP 636 PS 637: Performed by: NURSE PRACTITIONER

## 2018-01-09 PROCEDURE — 40000225 ZZH STATISTIC SLP WARD VISIT

## 2018-01-09 PROCEDURE — 85004 AUTOMATED DIFF WBC COUNT: CPT | Performed by: PHYSICIAN ASSISTANT

## 2018-01-09 PROCEDURE — 92526 ORAL FUNCTION THERAPY: CPT | Mod: GN

## 2018-01-09 PROCEDURE — 36415 COLL VENOUS BLD VENIPUNCTURE: CPT | Performed by: PHYSICIAN ASSISTANT

## 2018-01-09 PROCEDURE — 85045 AUTOMATED RETICULOCYTE COUNT: CPT | Performed by: PHYSICIAN ASSISTANT

## 2018-01-09 PROCEDURE — 25000132 ZZH RX MED GY IP 250 OP 250 PS 637: Performed by: STUDENT IN AN ORGANIZED HEALTH CARE EDUCATION/TRAINING PROGRAM

## 2018-01-09 PROCEDURE — 84443 ASSAY THYROID STIM HORMONE: CPT | Performed by: PHYSICIAN ASSISTANT

## 2018-01-09 PROCEDURE — 25000132 ZZH RX MED GY IP 250 OP 250 PS 637: Performed by: PHYSICIAN ASSISTANT

## 2018-01-09 PROCEDURE — 25000132 ZZH RX MED GY IP 250 OP 250 PS 637: Performed by: INTERNAL MEDICINE

## 2018-01-09 PROCEDURE — 25000128 H RX IP 250 OP 636: Performed by: INTERNAL MEDICINE

## 2018-01-09 PROCEDURE — 00000146 ZZHCL STATISTIC GLUCOSE BY METER IP

## 2018-01-09 PROCEDURE — 40000141 ZZH STATISTIC PERIPHERAL IV START W/O US GUIDANCE

## 2018-01-09 PROCEDURE — 12000024 ZZH R&B TRANSPLANT CRITICAL

## 2018-01-09 PROCEDURE — 99233 SBSQ HOSP IP/OBS HIGH 50: CPT | Performed by: INTERNAL MEDICINE

## 2018-01-09 PROCEDURE — 85027 COMPLETE CBC AUTOMATED: CPT | Performed by: PHYSICIAN ASSISTANT

## 2018-01-09 PROCEDURE — 86140 C-REACTIVE PROTEIN: CPT | Performed by: PHYSICIAN ASSISTANT

## 2018-01-09 PROCEDURE — 25800025 ZZH RX 258: Performed by: PHYSICIAN ASSISTANT

## 2018-01-09 PROCEDURE — 40000611 ZZHCL STATISTIC MORPHOLOGY W/INTERP HEMEPATH TC 85060: Performed by: INTERNAL MEDICINE

## 2018-01-09 RX ORDER — SODIUM CHLORIDE 9 MG/ML
INJECTION, SOLUTION INTRAVENOUS CONTINUOUS
Status: DISCONTINUED | OUTPATIENT
Start: 2018-01-09 | End: 2018-01-11

## 2018-01-09 RX ORDER — TRIAMCINOLONE ACETONIDE 1 MG/G
OINTMENT TOPICAL 2 TIMES DAILY
Status: DISCONTINUED | OUTPATIENT
Start: 2018-01-09 | End: 2018-01-15

## 2018-01-09 RX ORDER — ACETAMINOPHEN 325 MG/1
650 TABLET ORAL EVERY 6 HOURS PRN
Status: DISCONTINUED | OUTPATIENT
Start: 2018-01-09 | End: 2018-01-18 | Stop reason: HOSPADM

## 2018-01-09 RX ADMIN — TAMSULOSIN HYDROCHLORIDE 0.4 MG: 0.4 CAPSULE ORAL at 08:04

## 2018-01-09 RX ADMIN — MUPIROCIN: 2 CREAM TOPICAL at 11:15

## 2018-01-09 RX ADMIN — TRIAMCINOLONE ACETONIDE: 1 OINTMENT TOPICAL at 19:51

## 2018-01-09 RX ADMIN — HUMAN INSULIN 5 UNITS/HR: 100 INJECTION, SOLUTION SUBCUTANEOUS at 19:49

## 2018-01-09 RX ADMIN — INSULIN ASPART 3 UNITS: 100 INJECTION, SOLUTION INTRAVENOUS; SUBCUTANEOUS at 17:27

## 2018-01-09 RX ADMIN — PRAVASTATIN SODIUM 20 MG: 20 TABLET ORAL at 19:49

## 2018-01-09 RX ADMIN — HUMAN INSULIN 1 UNITS/HR: 100 INJECTION, SOLUTION SUBCUTANEOUS at 06:48

## 2018-01-09 RX ADMIN — HUMAN INSULIN 1 UNITS/HR: 100 INJECTION, SOLUTION SUBCUTANEOUS at 00:21

## 2018-01-09 RX ADMIN — SODIUM CHLORIDE: 9 INJECTION, SOLUTION INTRAVENOUS at 22:50

## 2018-01-09 RX ADMIN — SODIUM CHLORIDE 500 ML: 9 INJECTION, SOLUTION INTRAVENOUS at 16:31

## 2018-01-09 RX ADMIN — MUPIROCIN: 2 CREAM TOPICAL at 19:54

## 2018-01-09 RX ADMIN — SODIUM CHLORIDE: 9 INJECTION, SOLUTION INTRAVENOUS at 09:23

## 2018-01-09 RX ADMIN — DEXTROSE AND SODIUM CHLORIDE: 5; 450 INJECTION, SOLUTION INTRAVENOUS at 06:05

## 2018-01-09 NOTE — PROGRESS NOTES
Writer observed infiltration of left PIV this morning at start of shift. Infusion was subsequently stopped and PIV was removed per vascular. New PIV was placed and insulin gtt restarted in rotated site. Insulin is not specified as vesicant nor irritant - pharmacy was notified of this and asked for recommendations. Stevie, pharmacist, gave instruction to apply cold compress up to 4x daily and continue with hourly BG checks. Will continue to monitor.

## 2018-01-09 NOTE — PLAN OF CARE
Problem: Patient Care Overview  Goal: Plan of Care/Patient Progress Review  Discharge Planner SLP   Patient plan for discharge: patient unable to state  Current status: The patient is presenting with baseline oropharyngeal swallowing skills. Recommend a regular diet and thin liquids. Caregivers to encourage safety precautions: self selection of softer foods, small bites/sips, slow rate of feeding.  Barriers to return to prior living situation: none per SLP  Recommendations for discharge: defer to PT and MD  Rationale for recommendations: baseline chewing/swallowing skills & diet level       Entered by: Gissel Shepard 01/09/2018 2:42 PM       Speech Language Therapy Discharge Summary    Reason for therapy discharge:    All goals and outcomes met, no further needs identified.    Progress towards therapy goal(s). See goals on Care Plan in [facility] electronic health record for goal details.  Goals met    Therapy recommendation(s):    No further therapy is recommended.

## 2018-01-09 NOTE — PLAN OF CARE
Problem: Patient Care Overview  Goal: Plan of Care/Patient Progress Review  Outcome: No Change  Patient is disoriented to time, place and situation. He remains restless and requires frequent re-directing. Bedside attendant present throughout entire shift. AVSS on RA. Denies pain and nausea. Tolerating regular diet with good appetite. BG ranging 143-257; insulin gtt 1.5 - 5 U/hr with NS at 75 mL/hr. PIV sites rotated this AM (see previous note). Unable to measure accurate UOP d/t intermittent incontinent episodes. No BM this shift. During rounds Cinthia Mckeon MD stated MRI would need to be under sedation d/t patient's inability to lay still. Unclear when this will be. Will continue with plan of care.

## 2018-01-09 NOTE — PLAN OF CARE
Problem: Patient Care Overview  Goal: Plan of Care/Patient Progress Review  OT 7A: HOLD, per chart review and interdisciplinary collaboration, pt not appropriate for 2 disciplines due to agitation. Will hold OT at this time and initiate if/when appropriate.

## 2018-01-09 NOTE — PROGRESS NOTES
"Social Work Services Progress Note    Hospital Day: 2  Date of Initial Social Work Evaluation:  1/8/2017  Collaborated with:  Pts daughter Sophy, chart review.    Data:  PT rec TCU at discharge. Pt has a 1:1 and is confused. Pt resides in an [facility] with his wife.    Intervention:  SW called pts akhil Beckett, P: [phone]. Introduced self and role and gave her my contact info. Pts daughter is hopeful pt will \"snap out of it\" and be able to return home. She realizes that pt may need TCU. SW informed her I would remain involved and keep looking at the recommendations and will make referrals if needed.    Assessment: Pts daughter pleasant, understanding of needs at discharge and possible TCU stay.    Plan:    Anticipated Disposition: TCU or back to [facility] with home services.     Barriers to d/c plan:  Medical stability.    Follow Up:  SW to remain involved for placement and support.    JANICE Bolton, [address]   Ph: [phone], Pager: [phone]        "

## 2018-01-09 NOTE — PROGRESS NOTES
[facility], [place]    Internal Medicine Progress Note - Gold Service      Assessment & Plan   Christopher Cuevas is a 89 year old male admitted on 1/8/2018. He has PMH of Dementia, HTN, CAD s/p CABG, GERD, BPH and recent witnessed fall who was admitted to the NICU (1/7/17) from OSH with concern for ICH on CT. Patient admitted to NICU for further evaluation. CT images suggestive of calcification vs ICH vs related to hyperglycemia. Patient transferred out of the ICU and transferred to Medicine for further care.      Plans for today;  -pt still agitated  -after discussing with neurology will hold off on MRI for now   -continue with supportive nursing cares and delirium precautions       # RUE choreoathetosis, witnessed fall, Concern for ICH in patient with Dementia:  Pt. Presented to OSH after suffering a mechanical fall from standing (1/7/17) with reported head trauma and CT with concern for ICH. Family noted RU and possible RLE uncontrolled movement PTA. On admission to NICU CT with hyperdensity of the left putamen, diffuse cerebral volume loss and cerebral white matter changes consistent with chronic small vessel disease. Repeat CT with re demonstration of diffuse hyperdensity of the left putamen- likely remineralization, though f/u with MRI recommended. MRI attempted, but patient did not tolerate despite use of ativan.  Per discussion with Neurology, findings may be 2/2 hyperglycemia.      Overall the findings related to non ketotic hyperglycemia which can cause choreoathetoid movements     - continue with hyperglycemia management as below  -still delirious;   -fall precautions; delirium precautions   -trying to maintain sleep-wake cycle   -PT/OT involved   -metabolic workup so far negative  -will hold off on Aspirin for now and trying to maintain SBP<140  -continue with bedside sitter and will need TCU       # Hyperglycemia, DMII     HHS:    A1c 14.1 on admission. Previously 6.9 in 7/2016.  Admit with BG in mid 500's.  (AG 12-1/5/17). VBG 7.40/37/40/23. K 4.1 --> 3.6, Mg 2.1, Phos 3.8. BG low 200's on insulin gtt. S/p 2L NS and maintenance 0.45%. Ketones 1.1 (1/7/18) AG 14. Concern for new onset DM with possible paraneoplastic syndrome, though Ca down trending with IVF.   - Continue insulin gtt  - changed to NS at 75ml/hr and if continues to eat orally then d/c fluids   - Endocrine consult       # Morbilliform rash:   #Drug reaction:  Per nursing, patient with erythematous rash on bilateral UE with slight extension to the back. Only new medication patient received on admission is Ativan.   - Closely monitor  - started on triamcinolone 0.1% BID    # Hypercalcemia: Ca on admission 10.9 --> 10.5--> 9.8.   - Continue IVF  -still high level of calcium corrected to 10.3, continue to monitor   -will send other work up like vitamin D level, PTH level , ionized calcium and phosphorus for am      # Thrombocytopenia: Platelets 139 --> 111. Intermittently thrombocytopenic since at least 2013.   - likely dilution ; will continue to monitor which is pending       # HTN, CAD s/p CABG: No hx on file.    PTA ASA, lisinopril, HCTZ.   - Hold BP medications tonight as   - If BP > 140 would give PRN hydralazine      # ANISHA on CKD: Cr on admission 3.30 BUN elevated to 70 BL appears  ~ 2.0. Cr down to 2.10 --> 2.25 with IVF. Suspect prerenal in setting of hypovolemia.   - also has known h/o CKD with uncontrolled diabetes     # BPH: PTA Flomax.   - Continue PTA medication  - Bladder scan and Straight cath for PVR > 350       Diet: Regular Diet Adult  Fluids: IV fluids   DVT Prophylaxis: Low Risk/Ambulatory with no VTE prophylaxis indicated  Code Status: DNR/DNI    Disposition Plan   Expected discharge: 4 - 7 days, recommended to transitional care unit once off sitter and mental status is better      Entered: Cinthia Mckeon 01/09/2018, 1:44 PM   Information in the above section will display in the discharge  planner report.      The patient's care was discussed with the Bedside Nurse and daughter     Cinthia Mckeon  Internal Medicine Staff Hospitalist Service  [facility]  Pager: [phone]   Please see sticky note for cross cover information    Interval History   Pt was confused over night and had to call code 21 overnight; not as agitated in the morning but still confused and disoriented and needing sitter  Discussed with daughter that he is getting worse since [place] in regards to his weakness and concern was he is having more jerking movement of his right extremities more noticed when he is ambulating   He had a fall and had CT head with redemonstration of subtle assymetry of the left putamen    Data reviewed today: I reviewed all medications, new labs and imaging results over the last 24 hours. I personally reviewed CT head with subtle asymmetric diffuse hyperdensity   Physical Exam   Vital Signs: Temp: 98.2  F (36.8  C) Temp src: Oral BP: 113/49 Pulse: 87 Heart Rate: 97 Resp: 18 SpO2: 98 % O2 Device: None (Room air)    Weight: 160 lbs 11.45 oz  General Appearance: Pt confused but awake and alert   Respiratory: b/l equal breath sounds with no added sounds   Cardiovascular: s1s2 with no murmur   GI: soft, non tender, bowel sounds noted   Skin: presence of morbiliform rash in back and upper extremity   Neuro: AAOx1, b/l UE and LE-4/5; non focal           Data   Data     Recent Labs  Lab 01/09/18  0643 01/08/18 2022 01/08/18  0934 01/07/18 2000   WBC 5.9  --  7.8 8.1   HGB 11.5*  --  13.1* 13.5   MCV 93  --  92 88   *  --  111* 139*    140 140 132*   POTASSIUM 3.5 3.6 3.6 4.1   CHLORIDE 107 107 105 96   CO2 20 24 23 22   BUN 50* 54* 64* 70*   CR 2.10* 2.24* 2.71* 3.30*   ANIONGAP 11 8 12 14   LIZ 9.5 9.8 10.5* 10.9*   * 222* 228* 472*   ALBUMIN 3.0*  --   --  3.9   PROTTOTAL 5.6*  --   --  7.4   BILITOTAL 0.6  --   --  0.7   ALKPHOS 56  --   --  74   ALT 18  --   --  21   AST 22   --   --  20

## 2018-01-09 NOTE — PROVIDER NOTIFICATION
MD paged on day shift regarding patients low urine output of 300 mL. Pt bladder scanned this shift for 50 mL. MD placed order for 500 mL NS bolus and to increase patients MIVF from 75 mL/hr to 100 mL/hr.

## 2018-01-09 NOTE — PLAN OF CARE
Problem: Patient Care Overview  Goal: Plan of Care/Patient Progress Review  [id number]  VSS. Sleeping peacefully. Wakes up only to pee. Gets agitated when he needs to void. Has been up a few times to void in urinal. Pt is impulsive and tries to get out of bed, pulls at iv lines, will swing at staff when repositioning. PIV in R arm. D5 1/2NS @100ml/hr. Insulin gtt on algorithm 2 hourly blood sugar checks- 1u/hr. Sitter at bedside.

## 2018-01-09 NOTE — PLAN OF CARE
Problem: Fall Risk (Adult)  Goal: Identify Related Risk Factors and Signs and Symptoms  Related risk factors and signs and symptoms are identified upon initiation of Human Response Clinical Practice Guideline (CPG).   Outcome: No Change  AVSS. [id number]. Checking hourly. Voided in the urinal x 1. PVR 0. Incontinent of urine x 2. Overnight the pt would be asleep and wake up agitated. Sitter would be able to reorient him quickly, would help him use the urinal and/or change his brief then he would go back to sleep. At 0515 his BS was 95 and his Insulin drip was shut off. At about 0515 pt was asleep and when he woke up agitated made an abrupt movement of this arm, pulling out his IV. He was bleeding out of the IV cannula as the clamp was on the part of the IV that was pulled out. Pressure pt on pt's arm and IV needed to be removed. Pt was even more agitated that he was not being allowed to get up to void while we were tending to his bleeding arm. Christopher was told to go in his pad if he could not wait a few minutes and he did not like hearing that either. When I took my hand off his arm to grab gauze and tape, he took at swing at the sitter so a code 21 was called. Pt had to be held down will old PIV was being dressed and while he was getting changed. He was then talked to about not hitting and kicking staff. He then became calm. BS recheck was 152 so Insulin drip was restarted.

## 2018-01-09 NOTE — PROGRESS NOTES
Care Coordinator Progress Note     Admission Date/Time:  1/8/2018  Attending MD:  Cinthia Mckeon MD     Data  Chart reviewed, discussed with interdisciplinary team.   Patient was admitted for:    Choreiform movement  Hyperglycemia.    Concerns with insurance coverage for discharge needs: None.  Current Living Situation: Patient lives in an assisted living facility.  Support System: Supportive and Involved  Services Involved: assisted living cares  Transportation: Family or Friend will provide  Barriers to Discharge: physical impairment and medical clearance       Assessment  Pt is a 89 year old male with PMH significant for dementia, HTN, CAD s/p CABG, GERD, BPH and recent witnessed fall who was admitted for concern of ICH. Per chart review, pt resides at [facility] (phone: [phone]) with his spouse. Spoke with ELSY Jalloh at [facility], to discuss pt's level of assist prior to admission and what [facility] could accommodate. Per Yeimi, pt gets assist with bathing and medications. Pt should have an escort to/from meals but refuses some cares. Yeimi stated that pt's wife also has cognitive deficits and memory impairment. Pt has a walker for ambulation. Yeimi stated that staff can accommodate residents who need assist of 1 for cares and assist of 2 with transfers. If pt would need insulin on discharge, [facility] cannot do sliding scale. Residents are able to have home care in the [facility] and Yeimi stated that the [facility] typically uses [facility] (phone: [phone]).Will continue to follow plan of care and update [facility] as pt is clinically closer to discharge to see if facility can safely manage pt's needs.      Plan  Anticipated Discharge Date:  3-4 days  Anticipated Discharge Plan:  TCU vs [facility] with possible home care    Lila Luque, ELSY

## 2018-01-09 NOTE — CONSULTS
"Diabetes/Hyperglycemia Management Consult    Chief Complaint glycemic management  Consult requested by: Aparna Babin PA-C  History of Present Illness Christopher Cuevas is a 89 year old male with history of dementia, hypertension, CAD s/p CABG,  GERD, and a recent fall who was admitted to the NICU (1/7/2018) from OSH with concern for ICH. CT images suggestive of calcification vs ICH vs related to hyperglycemia.    Information was obtained via review of medical records and talking with daughter. Mr. Cuevas is cognitively impaired and agitated.  Mr. Monae was dx with Diabetes on (1/432018) during a clinic appointment. Per family was started on lantus ( not sure of dosage) on (1/4/2018). Was re-evaluated for \"involuntary right arm movements\" lantus was increased. No previous history of diabetes, but daughter reports likes to eat \"sugar\" foods.  Called and talked with staff from [facility], Mr. Cuevas was started on Lantus 10 units in the morning, first dose 1/4 and blood sugars were tested daily before breakfast.   Blood sugars ranged from 431-512. Lantus was increased to 15 units starting 1/6 . Per staff, his A1C was tested at the clinic visit and was > 14%    Blood sugars on admission > 400. Was given 10 units of Regular insulin and started on IV insulin. IV insuiln ranged from 0-5 units while on IV fluids containing dextrose.   Tolerated a regular diet      Recent Labs  Lab 01/09/18  0803 01/09/18  0643 01/09/18  0642 01/09/18  0517 01/09/18  0416 01/09/18  0328 01/09/18  0219  01/08/18  2022  01/08/18  0934  01/07/18 2000   GLC  --  147*  --   --   --   --   --   --  222*  --  228*  --  472*   *  --  152* 95 127* 143* 120*  < >  --   < >  --   < >  --    < > = values in this interval not displayed.      Diabetes Type:   Type 2 Diabetes  Diabetes Duration: recently dx  Usual Diabetes Regimen:  Lantus 15 units    Ability to [place] Prescribed Regimen: no, due to cognition  Diabetes " "Control:   Lab Results   Component Value Date    A1C 14.1 01/08/2018     Diabetes Complications: unknown  History of DKA: no history  Able to Detect Hypoglycemia: no history  Usual Diabetes Care Provider: [facility] Juan [facility]ora at [facility]  Factors Impacting Glucose Control: diet      Review of Systems  Unable to do a review of symptoms 2/2 dementia and agitation    Past medical, family and social histories are reviewed and updated.    Past Medical History, reviewed and updated as indicated  Past Medical History:   Diagnosis Date     Chronic kidney disease      Dementia      Dementia      Diabetes (H)      Gastroesophageal reflux disease      Hyperkalemia      Hyperlipidemia      Hypertension        Family History  Patient unable to give family hx    Social History  Social History     Social History     Marital status:      Spouse name: N/A     Number of children: N/A     Years of education: N/A     Social History Main Topics     Smoking status: Former Smoker     Smokeless tobacco: Not on file      Comment: quit 45 years ago     Alcohol use Not on file     Drug use: Not on file     Sexual activity: Not on file     Other Topics Concern     Not on file     Social History Narrative         Physical Exam  /49 (BP Location: Left arm)  Pulse 87  Temp 98.2  F (36.8  C) (Oral)  Resp 18  Ht 1.727 m (5' 8\")  Wt 72.9 kg (160 lb 11.5 oz)  SpO2 98%  BMI 24.44 kg/m2    General:  NAD.   HEENT:  PER and anicteric, non-injected, oral mucous membranes moist.   Lungs: unlabored  ABD: soft  Skin: warm and dry, no obvious lesions to exposed areas  MSK: moving all extremities  Mental status: confused  Psych: agitated    Laboratory  Recent Labs   Lab Test  01/09/18   0643  01/08/18 2022   NA  137  140   POTASSIUM  3.5  3.6   CHLORIDE  107  107   CO2  20  24   ANIONGAP  11  8   GLC  147*  222*   BUN  50*  54*   CR  2.10*  2.24*   LIZ  9.5  9.8     CBC RESULTS:   Recent Labs   Lab Test  01/09/18   0643   WBC  " 5.9   RBC  3.65*   HGB  11.5*   HCT  33.9*   MCV  93   MCH  31.5   MCHC  33.9   RDW  13.0   PLT  102*       Liver Function Studies -   Recent Labs   Lab Test  01/09/18   0643   PROTTOTAL  5.6*   ALBUMIN  3.0*   BILITOTAL  0.6   ALKPHOS  56   AST  22   ALT  18       Active Medications  Current Facility-Administered Medications   Medication     acetaminophen (TYLENOL) tablet 650 mg     naloxone (NARCAN) injection 0.1-0.4 mg     hydrALAZINE (APRESOLINE) injection 10-20 mg     insulin 1 unit/mL in saline (NovoLIN, HumuLIN Regular) drip - ADULT IV Infusion     glucose 40 % gel 15-30 g    Or     dextrose 50 % injection 25-50 mL    Or     glucagon injection 1 mg     labetalol (NORMODYNE/TRANDATE) injection 20 mg     mupirocin (BACTROBAN) 2 % cream     imiquimod (ALDARA) 5 % cream 0.25 g     pravastatin (PRAVACHOL) tablet 20 mg     tamsulosin (FLOMAX) capsule 0.4 mg     dextrose 5% and 0.45% NaCl infusion     No current outpatient prescriptions on file.       Current Diet    Active Diet Order      Regular Diet Adult      Assessment: Christopher Cuevas is a 89 year old male with history of dementia, hypertension, CAD s/p CABG,  GERD, and a recent fall who was admitted to the NICU (1/7/2018) from OSH with concern for ICH. CT images suggestive of calcification vs ICH vs related to hyperglycemia.    Type 2 diabetes: poorly controlled with A1C > 14% ( 1/7/2018)  ANISHA on CKD: Cr on admission 3.30, Cr trending downward 2.71--> 2.24--> 2.10    Plan  Dextrose fluids were d/c this am ( had been receiving 5 gram of dextrose per hour)  -will add prandial insulin at 1 unit per 15 grams of CHO with meals and snacks ( determined by weight based)  -continue on IV insulin overnight, to determine basal insulin dose and plan to transition to sub-q insulin in the am  Will continue to follow    Jordi Kothari, [phone]    Diabetes Management Team job code: [id number]

## 2018-01-09 NOTE — TREATMENT PLAN
Called to replace old IV by ELSY Sawyer. Pt's current IV was red with clear s/s of infiltration with Insulin. Left arm is enlarged due to infiltration which was brought to Rn's attention after new IV placed. It was advised to RN to talk with Pharmacy to see what might need to be done proactively to treat large infiltration of Insuline.  RN agreed.

## 2018-01-09 NOTE — PROGRESS NOTES
Stroke Progress Note    The patient was not seen or examined today, as the stroke team was awaiting MRI results to rule in or rule out anterior cerebral hemorrhage. Upon discussion with the primary team, the patient remains very agitated, and already failed a trial of MRI with conscious sedation. He would likely require sedation to obtain the MRI.    The differential for his CT findings of left putaminal hyperintensity does include intracerebral hemorrhage, but this is much less likely based on the imaging. Rather, his choreoathetoid movements and imaging findings are consistent with unilateral putaminal diffusion restriction secondary to nonketotic hyperglycemia. It is well described in case reports in the literature, that in the setting of nonketotic hyperglycemia, such as this patient, patients can have unilateral putaminal diffusion restriction as well as acute neurologic symptoms presenting like stroke.     At this time, an MRI thought necessary. However, if the patient fails to improve with correction of his blood sugars with time, could consider an MRI or repeat head CT in one month for comparison.    Regarding his agitation, this is most consistent with a hyperactive delirium. Patients with dementia, often become acutely delirious while inpatient or otherwise. At this time, would recommend supportive nursing cares, delirium precautions, promoting sleep wake cycles.     The stroke team will sign off at this time.   Patient discussed with attending neurologist, Dr. Reese.     Dahiana Osorio PGY2

## 2018-01-10 ENCOUNTER — APPOINTMENT (OUTPATIENT)
Dept: PHYSICAL THERAPY | Facility: CLINIC | Age: 83
DRG: 637 | End: 2018-01-10
Attending: PSYCHIATRY & NEUROLOGY
Payer: COMMERCIAL

## 2018-01-10 ENCOUNTER — APPOINTMENT (OUTPATIENT)
Dept: MRI IMAGING | Facility: CLINIC | Age: 83
DRG: 637 | End: 2018-01-10
Attending: STUDENT IN AN ORGANIZED HEALTH CARE EDUCATION/TRAINING PROGRAM
Payer: COMMERCIAL

## 2018-01-10 LAB
ANION GAP SERPL CALCULATED.3IONS-SCNC: 12 MMOL/L (ref 3–14)
BUN SERPL-MCNC: 35 MG/DL (ref 7–30)
CA-I SERPL ISE-MCNC: 4.8 MG/DL (ref 4.4–5.2)
CALCIUM SERPL-MCNC: 8.6 MG/DL (ref 8.5–10.1)
CHLORIDE SERPL-SCNC: 111 MMOL/L (ref 94–109)
CO2 SERPL-SCNC: 20 MMOL/L (ref 20–32)
CREAT SERPL-MCNC: 1.88 MG/DL (ref 0.66–1.25)
GFR SERPL CREATININE-BSD FRML MDRD: 34 ML/MIN/1.7M2
GLUCOSE BLDC GLUCOMTR-MCNC: 107 MG/DL (ref 70–99)
GLUCOSE BLDC GLUCOMTR-MCNC: 110 MG/DL (ref 70–99)
GLUCOSE BLDC GLUCOMTR-MCNC: 118 MG/DL (ref 70–99)
GLUCOSE BLDC GLUCOMTR-MCNC: 122 MG/DL (ref 70–99)
GLUCOSE BLDC GLUCOMTR-MCNC: 125 MG/DL (ref 70–99)
GLUCOSE BLDC GLUCOMTR-MCNC: 125 MG/DL (ref 70–99)
GLUCOSE BLDC GLUCOMTR-MCNC: 129 MG/DL (ref 70–99)
GLUCOSE BLDC GLUCOMTR-MCNC: 130 MG/DL (ref 70–99)
GLUCOSE BLDC GLUCOMTR-MCNC: 130 MG/DL (ref 70–99)
GLUCOSE BLDC GLUCOMTR-MCNC: 145 MG/DL (ref 70–99)
GLUCOSE BLDC GLUCOMTR-MCNC: 174 MG/DL (ref 70–99)
GLUCOSE BLDC GLUCOMTR-MCNC: 177 MG/DL (ref 70–99)
GLUCOSE BLDC GLUCOMTR-MCNC: 303 MG/DL (ref 70–99)
GLUCOSE BLDC GLUCOMTR-MCNC: 97 MG/DL (ref 70–99)
GLUCOSE SERPL-MCNC: 126 MG/DL (ref 70–99)
PHOSPHATE SERPL-MCNC: 2.2 MG/DL (ref 2.5–4.5)
POTASSIUM SERPL-SCNC: 3.7 MMOL/L (ref 3.4–5.3)
SODIUM SERPL-SCNC: 143 MMOL/L (ref 133–144)

## 2018-01-10 PROCEDURE — 25000128 H RX IP 250 OP 636: Performed by: INTERNAL MEDICINE

## 2018-01-10 PROCEDURE — 00000146 ZZHCL STATISTIC GLUCOSE BY METER IP

## 2018-01-10 PROCEDURE — 82542 COL CHROMOTOGRAPHY QUAL/QUAN: CPT | Performed by: INTERNAL MEDICINE

## 2018-01-10 PROCEDURE — 12000024 ZZH R&B TRANSPLANT CRITICAL

## 2018-01-10 PROCEDURE — 25000132 ZZH RX MED GY IP 250 OP 250 PS 637: Performed by: INTERNAL MEDICINE

## 2018-01-10 PROCEDURE — 25000132 ZZH RX MED GY IP 250 OP 250 PS 637: Performed by: STUDENT IN AN ORGANIZED HEALTH CARE EDUCATION/TRAINING PROGRAM

## 2018-01-10 PROCEDURE — 25000132 ZZH RX MED GY IP 250 OP 250 PS 637: Performed by: PHYSICIAN ASSISTANT

## 2018-01-10 PROCEDURE — 36415 COLL VENOUS BLD VENIPUNCTURE: CPT | Performed by: INTERNAL MEDICINE

## 2018-01-10 PROCEDURE — 97530 THERAPEUTIC ACTIVITIES: CPT | Mod: GP | Performed by: PHYSICAL THERAPIST

## 2018-01-10 PROCEDURE — 25000131 ZZH RX MED GY IP 250 OP 636 PS 637: Performed by: NURSE PRACTITIONER

## 2018-01-10 PROCEDURE — 82330 ASSAY OF CALCIUM: CPT | Performed by: INTERNAL MEDICINE

## 2018-01-10 PROCEDURE — 70551 MRI BRAIN STEM W/O DYE: CPT

## 2018-01-10 PROCEDURE — 84100 ASSAY OF PHOSPHORUS: CPT | Performed by: INTERNAL MEDICINE

## 2018-01-10 PROCEDURE — 99233 SBSQ HOSP IP/OBS HIGH 50: CPT | Performed by: INTERNAL MEDICINE

## 2018-01-10 PROCEDURE — 97116 GAIT TRAINING THERAPY: CPT | Mod: GP | Performed by: PHYSICAL THERAPIST

## 2018-01-10 PROCEDURE — 82306 VITAMIN D 25 HYDROXY: CPT | Performed by: INTERNAL MEDICINE

## 2018-01-10 PROCEDURE — 80048 BASIC METABOLIC PNL TOTAL CA: CPT | Performed by: INTERNAL MEDICINE

## 2018-01-10 PROCEDURE — 40000556 ZZH STATISTIC PERIPHERAL IV START W US GUIDANCE

## 2018-01-10 PROCEDURE — 40000193 ZZH STATISTIC PT WARD VISIT: Performed by: PHYSICAL THERAPIST

## 2018-01-10 RX ORDER — LORAZEPAM 2 MG/ML
1 INJECTION INTRAMUSCULAR ONCE
Status: COMPLETED | OUTPATIENT
Start: 2018-01-10 | End: 2018-01-10

## 2018-01-10 RX ORDER — ONDANSETRON 2 MG/ML
4 INJECTION INTRAMUSCULAR; INTRAVENOUS EVERY 6 HOURS PRN
Status: DISCONTINUED | OUTPATIENT
Start: 2018-01-10 | End: 2018-01-18 | Stop reason: HOSPADM

## 2018-01-10 RX ORDER — EMOLLIENT BASE
CREAM (GRAM) TOPICAL 3 TIMES DAILY
Status: DISCONTINUED | OUTPATIENT
Start: 2018-01-10 | End: 2018-01-17

## 2018-01-10 RX ORDER — HYDROMORPHONE HYDROCHLORIDE 1 MG/ML
1 INJECTION, SOLUTION INTRAMUSCULAR; INTRAVENOUS; SUBCUTANEOUS ONCE
Status: DISCONTINUED | OUTPATIENT
Start: 2018-01-10 | End: 2018-01-18 | Stop reason: HOSPADM

## 2018-01-10 RX ORDER — ASPIRIN 81 MG/1
81 TABLET, CHEWABLE ORAL DAILY
Status: DISCONTINUED | OUTPATIENT
Start: 2018-01-10 | End: 2018-01-18 | Stop reason: HOSPADM

## 2018-01-10 RX ADMIN — INSULIN ASPART 4 UNITS: 100 INJECTION, SOLUTION INTRAVENOUS; SUBCUTANEOUS at 18:15

## 2018-01-10 RX ADMIN — ACETAMINOPHEN 650 MG: 325 TABLET ORAL at 07:19

## 2018-01-10 RX ADMIN — TRIAMCINOLONE ACETONIDE: 1 OINTMENT TOPICAL at 19:33

## 2018-01-10 RX ADMIN — TAMSULOSIN HYDROCHLORIDE 0.4 MG: 0.4 CAPSULE ORAL at 08:43

## 2018-01-10 RX ADMIN — INSULIN GLARGINE 19 UNITS: 100 INJECTION, SOLUTION SUBCUTANEOUS at 10:16

## 2018-01-10 RX ADMIN — ASPIRIN 81 MG CHEWABLE TABLET 81 MG: 81 TABLET CHEWABLE at 17:45

## 2018-01-10 RX ADMIN — PRAVASTATIN SODIUM 20 MG: 20 TABLET ORAL at 19:33

## 2018-01-10 RX ADMIN — SODIUM CHLORIDE: 9 INJECTION, SOLUTION INTRAVENOUS at 10:24

## 2018-01-10 RX ADMIN — SODIUM CHLORIDE: 9 INJECTION, SOLUTION INTRAVENOUS at 21:02

## 2018-01-10 RX ADMIN — TRIAMCINOLONE ACETONIDE: 1 OINTMENT TOPICAL at 08:39

## 2018-01-10 RX ADMIN — MUPIROCIN: 2 CREAM TOPICAL at 19:33

## 2018-01-10 RX ADMIN — INSULIN ASPART 3 UNITS: 100 INJECTION, SOLUTION INTRAVENOUS; SUBCUTANEOUS at 11:37

## 2018-01-10 RX ADMIN — IMIQUIMOD 0.25 G: 12.5 CREAM TOPICAL at 11:37

## 2018-01-10 RX ADMIN — LORAZEPAM 1 MG: 2 INJECTION INTRAMUSCULAR; INTRAVENOUS at 09:11

## 2018-01-10 RX ADMIN — MUPIROCIN: 2 CREAM TOPICAL at 08:39

## 2018-01-10 RX ADMIN — Medication: at 19:34

## 2018-01-10 RX ADMIN — Medication: at 17:45

## 2018-01-10 RX ADMIN — ACETAMINOPHEN 650 MG: 325 TABLET ORAL at 01:26

## 2018-01-10 ASSESSMENT — ACTIVITIES OF DAILY LIVING (ADL)
COGNITION: 1 - ATTENTION OR MEMORY DEFICITS
WHICH_OF_THE_ABOVE_FUNCTIONAL_RISKS_HAD_A_RECENT_ONSET_OR_CHANGE?: COGNITION;FALL HISTORY
RETIRED_COMMUNICATION: 2-->DIFFICULTY UNDERSTANDING (NOT RELATED TO LANGUAGE BARRIER)
SWALLOWING: 0-->SWALLOWS FOODS/LIQUIDS WITHOUT DIFFICULTY
RETIRED_EATING: 0-->INDEPENDENT

## 2018-01-10 ASSESSMENT — VISUAL ACUITY
OU: GLASSES
OU: GLASSES

## 2018-01-10 NOTE — CONSULTS
[facility] Inpatient Consult Dermatology Note    Impression/Plan:  1. Eczematous-type rash- Morphologically appears to be 2/2 eczema craquele with possible component of nutritional deficiency. Does not appear to be due to a drug rash. He has not started on any big culprit medications. He can still have drug rash from old medication, but again clinically does not appear to be drug rash. Rash is not morbiliform and looks more scaly like eczema. Relatively spares the left side of his body and mostly involves his extremities, which would be atypical for drug. Have elected not to do biopsy at this time, given classic appearance for eczema. Would treat with topical steroids and frequent emollients.  Start triamcinolone 0.1% ointment BID  Order emollient to use at least BID (such as vaseline/aquaphor/vanicream)  Optimize nutritional status  2. History of NMSC/?melanoma- Patient should have f/u with dermatology as outpatient. He has multiple crusted papules on the scalp, which can be seen with AKs vs SCC. Will need in-depth exam as outpatient.     F/u with dermatology as outpatient    Thank you for the dermatology consultation. Please do not hesitate to contact the dermatology resident/faculty on call for any additional questions or concerns.     Kiara Zelaya MD  Internal Medicine-Dermatology, PGY-2    Staffed with Dr. Quinn    I have seen and examined this patient and agree with the assessment and plan as documented in the resident's note.    Shadi Quinn MD  Dermatology Attending      Dermatology Problem List:  1. Eczematous rash- likely eczema craquele with possible component of nutritional deficiency    Date of Admission: Jan 8, 2018   Encounter Date: 1/10/2018     Reason for Consultation:   Rash    History of Present Illness:  Mr. Christopher Cuevas is a 89 year old male with PMH Dementia, HTN, CAD s/p CABG, who  who was admitted on 1/8/18 for fall and concern of ICH. Dermatology was  consulted for rash that was noticedon the arms while in the ED on 1/7/18.  History obtained from the patient's daughter due to patient's underlying dementia.  The patient's daughter states that the rash was first noticed a few days ago. Uncertain to the extent of the rash but they see on the right upper extremity. The patient has not started any new medications, other than insulin he has received inpatient. The rash is not bothersome to the patient; non-burning, not pruritic and not painful.  He denies F/C/cough/recent infections/rhinorrhea. Daughter also states he has not had any recent infections.  Denies prior history of rash or eczema.  Daughter mentions he has had history of NMSC and possibly a melanoma that was removed many years ago.    Past Medical History:   Patient Active Problem List   Diagnosis     Neoplasm of uncertain behavior of skin     Basal cell carcinoma, scalp/neck     SCC (squamous cell carcinoma), face     ICH (intracerebral hemorrhage) (H)     Past Medical History:   Diagnosis Date     Chronic kidney disease      Dementia      Dementia      Diabetes (H)      Gastroesophageal reflux disease      Hyperkalemia      Hyperlipidemia      Hypertension      No past surgical history on file.      Social History:  The patient lives in assisted living facility with his wife    Family History:  Family history unknown.     Medications:  Current Facility-Administered Medications   Medication     HYDROmorphone (PF) (DILAUDID) injection 1 mg     insulin glargine (LANTUS) injection 19 Units     insulin aspart (NovoLOG) inj (RAPID ACTING)     insulin aspart (NovoLOG) inj (RAPID ACTING)     Med Instruction - Transition from IV Insulin Infusion to Sub-Q Insulin     acetaminophen (TYLENOL) tablet 650 mg     0.9% sodium chloride infusion     triamcinolone (KENALOG) 0.1 % ointment     insulin aspart (NovoLOG) inj (RAPID ACTING)     insulin aspart (NovoLOG) inj (RAPID ACTING)     naloxone (NARCAN) injection 0.1-0.4  "mg     hydrALAZINE (APRESOLINE) injection 10-20 mg     insulin 1 unit/mL in saline (NovoLIN, HumuLIN Regular) drip - ADULT IV Infusion     glucose 40 % gel 15-30 g    Or     dextrose 50 % injection 25-50 mL    Or     glucagon injection 1 mg     labetalol (NORMODYNE/TRANDATE) injection 20 mg     mupirocin (BACTROBAN) 2 % cream     imiquimod (ALDARA) 5 % cream 0.25 g     pravastatin (PRAVACHOL) tablet 20 mg     tamsulosin (FLOMAX) capsule 0.4 mg          No Known Allergies      Review of Systems:  -As per HPI      Physical exam:  Vitals: /58 (BP Location: Right arm)  Pulse 87  Temp 98  F (36.7  C) (Axillary)  Resp 18  Ht 1.727 m (5' 8\")  Wt 72.9 kg (160 lb 11.5 oz)  SpO2 97%  BMI 24.44 kg/m2  GEN: NAD  SKIN: Total skin excluding the undergarment areas was performed. The exam included the head/face, neck, both arms, chest, back, abdomen, both legs, digits and/or nails.   -Lace-like scaly eczematous thin pink plaques on the lateral RLE. On the RUE he has scaly pink-red plaque with multiple linear excoriations and excoriated papules. Rash relatively spares the L side.  -Posterior R shoulder and upper back with thin eczematous scaly plaque with excoriated papules  -There are no vesicles  -L abdomen with faint pink scaly papules  -Crusted papules on the scalp  -No other lesions of concern on areas examined.     Laboratory:  Results for orders placed or performed during the hospital encounter of 01/08/18 (from the past 24 hour(s))   Glucose by meter   Result Value Ref Range    Glucose 143 (H) 70 - 99 mg/dL   Glucose by meter   Result Value Ref Range    Glucose 205 (H) 70 - 99 mg/dL   Glucose by meter   Result Value Ref Range    Glucose 160 (H) 70 - 99 mg/dL   Glucose by meter   Result Value Ref Range    Glucose 97 70 - 99 mg/dL   Glucose by meter   Result Value Ref Range    Glucose 113 (H) 70 - 99 mg/dL   Glucose by meter   Result Value Ref Range    Glucose 115 (H) 70 - 99 mg/dL   Glucose by meter   Result " Value Ref Range    Glucose 205 (H) 70 - 99 mg/dL   Glucose by meter   Result Value Ref Range    Glucose 212 (H) 70 - 99 mg/dL   Glucose by meter   Result Value Ref Range    Glucose 173 (H) 70 - 99 mg/dL   Glucose by meter   Result Value Ref Range    Glucose 162 (H) 70 - 99 mg/dL   Glucose by meter   Result Value Ref Range    Glucose 98 70 - 99 mg/dL   Glucose by meter   Result Value Ref Range    Glucose 90 70 - 99 mg/dL   Glucose by meter   Result Value Ref Range    Glucose 125 (H) 70 - 99 mg/dL   Glucose by meter   Result Value Ref Range    Glucose 130 (H) 70 - 99 mg/dL   Glucose by meter   Result Value Ref Range    Glucose 129 (H) 70 - 99 mg/dL   Glucose by meter   Result Value Ref Range    Glucose 118 (H) 70 - 99 mg/dL   Glucose by meter   Result Value Ref Range    Glucose 97 70 - 99 mg/dL   Glucose by meter   Result Value Ref Range    Glucose 125 (H) 70 - 99 mg/dL   Glucose by meter   Result Value Ref Range    Glucose 107 (H) 70 - 99 mg/dL   Glucose by meter   Result Value Ref Range    Glucose 110 (H) 70 - 99 mg/dL   Glucose by meter   Result Value Ref Range    Glucose 130 (H) 70 - 99 mg/dL   Glucose by meter   Result Value Ref Range    Glucose 145 (H) 70 - 99 mg/dL   Glucose by meter   Result Value Ref Range    Glucose 122 (H) 70 - 99 mg/dL       Dr. Quinn staffed the patient.    Staff Involved:  Resident(Kiara Zelaya)/Staff(as above)

## 2018-01-10 NOTE — CONSULTS
[facility]: [facility]  Neurology Consultation    Patient Name:  Christopher Cuevas  MRN:  [id number]    :  [id number]  Date of Admission:  2018  Date of Service:  January 10, 2018  Primary care provider:  Lucas Thompson      We were asked to see the patient by Dr. Mckeon to evaluate abnormal movements.    History of Present Illness:   Christopher Cuevas is a 89 year old gentleman with PMH of dementia, HTN, CAD s/p CABG, and poorly controlled T2DM who was admitted on  for RUE\RLE choreoathetosis, initial imaging concerning for ICH, and HHS. The patient recently developed weakness of the RUE around 1/3/18 and around 18 he began to have abnormal movements of his right upper and right lower extremities. On the evening of 18 he had a fall with trauma to the head. He was brought to an OSH and HCT was suspicious for ICH prompting transfer here to [facility]. Upon admission follow up HCT showed stability and likely calcification. The patient was also found to have a BG of 565 in the field and 472 on admission. She was evaluated by the stroke team and the abnormal movements were thought to be due to HHS.    He has been very agitated in the evenings since admission however he seems to be doing better today. He continues to have RUE/RLE choreoathetosis.     ROS: A 10-point ROS is negative except as noted above.      Past Medical/Surgical History:  -CKD  -Dementia  -DM2  -Hyperkalemia  -HTN  -Hyperlipidemia  No past surgical hx on file    Medications:    Current Facility-Administered Medications   Medication     HYDROmorphone (PF) (DILAUDID) injection 1 mg     insulin glargine (LANTUS) injection 19 Units     insulin aspart (NovoLOG) inj (RAPID ACTING)     insulin aspart (NovoLOG) inj (RAPID ACTING)     Med Instruction - Transition from IV Insulin Infusion to Sub-Q Insulin     acetaminophen (TYLENOL) tablet 650 mg     0.9% sodium chloride infusion     triamcinolone (KENALOG) 0.1 % ointment      "insulin aspart (NovoLOG) inj (RAPID ACTING)     insulin aspart (NovoLOG) inj (RAPID ACTING)     naloxone (NARCAN) injection 0.1-0.4 mg     hydrALAZINE (APRESOLINE) injection 10-20 mg     insulin 1 unit/mL in saline (NovoLIN, HumuLIN Regular) drip - ADULT IV Infusion     glucose 40 % gel 15-30 g    Or     dextrose 50 % injection 25-50 mL    Or     glucagon injection 1 mg     labetalol (NORMODYNE/TRANDATE) injection 20 mg     mupirocin (BACTROBAN) 2 % cream     imiquimod (ALDARA) 5 % cream 0.25 g     pravastatin (PRAVACHOL) tablet 20 mg     tamsulosin (FLOMAX) capsule 0.4 mg       Allergies:  NKDA    Social History:  Former smoker. Lives at an assisted care facility. Fairly independent at baseline.    Family History:    No family History on file    Neurologic Examination:    Vitals: /58 (BP Location: Right arm)  Pulse 87  Temp 98  F (36.7  C) (Axillary)  Resp 18  Ht 1.727 m (5' 8\")  Wt 72.9 kg (160 lb 11.5 oz)  SpO2 97%  BMI 24.44 kg/m2  General: Lying in bed and in NAD  HEENT: AT/NC   Neurologic:     Mental Status: Drowsy but responding to verbal stimuli. oriented to person and place but failed to recall current month and year. Speech clear and fluent, able to follow commands.      Cranial Nerves: PERRL. EOMI. Blinking to threat bilaterally. Facial sensation intact/symmetric. Facial movements symmetric. Hearing intact to conversation. Palate elevation symmetric, uvula midline. No dysarthria. Tongue protrusion midline.     Motor: Involuntary choreoathotic movements of the RUE and RLE which also involved grimace of the right side of the face. Normal tone. Moving all extremities spontaneously and antigravity.     Deep Tendon Reflexes: 2+/symmetric throughout.      Sensory: Light touch sensation intact/symmetric throughout upper and lower extremities.      Coordination: FNF intact without dysmetria.     Pertinent Investigations:   Na 137, K 3.5, BUN 50, Cr 2.10, GFR 30  Ca , Mg 2.1, Phos, 3.8  ALT 18, AST " 22, Alk Phos 56  Glucose 145, A1C 14.1  WBC 5.9, Plt 102    Head CT w/o contrast 1/08/2018:  1. Redemonstration of subtle asymmetric diffuse hyperdensity of the left putamen. This is more likely to reflect mineralization. Given the  clinical history of choeroathetosis, an MRI would be helpful to further investigate.   2. No new changes  3. Advanced parenchymal volume loss.  4. Extensive chronic white matter microvascular ischemic changes    MRI Brain w/o contrast 1/10/18:  1. Bilateral mineralization in the basal ganglia, greater on the left.  2. Diffuse generalized cerebral/cerebellar volume loss and accompanying moderate leukoaraiosis.  3. No new intracranial findings since 1/8/2018    Impression:  Christopher Cuevas is a 89 year old gentleman with PMH of dementia, HTN, CAD s/p CABG, and poorly controlled T2DM who was admitted on 1/8 for RUE\RLE choreoathetosis, initial imaging concerning for ICH, and HHS. Neurology was consulted for RUE/RLE choreoathetosis.  On examination the patient demonstrates involuntary choreoathotic movements of the RUE and RLE which also involved grimace of the right side of the face. Given the acute onset of these movements the differential includes HHS and stroke. Agree with stroke team as documented by Dr. Osorio that these movements are most likely due to HHS.  The movements should improve over the next few weeks with improvement in blood glucose control.     Recommendations:   -Continue with hyperglycemia management   -Follow up with General Neurology in 1 month to reevaluate choreoathetosis. If movements do not improve by that time will consider medication options as an outpatient.     Thank you for involving neurology in the care of Christopher Cuevas.  Please do not hesitate to call with questions/concerns.      Patient was seen and discussed with attending neurologist, Dr. Bob Leach M.D.    Patient's note, history, and part of physical examination assisted by John Manning MS3.  Reviewed with Dr. Margarita Avila MD  Neurology PGY2  [id number]

## 2018-01-10 NOTE — PROGRESS NOTES
[facility], [place]    Internal Medicine Progress Note - Gold Service      Assessment & Plan   Christopher Cuevas is a 89 year old male admitted on 1/8/2018. He has PMH of Dementia, HTN, CAD s/p CABG, GERD, BPH and recent witnessed fall who was admitted to the NICU (1/7/17) from OSH with concern for ICH on CT. Patient admitted to NICU for further evaluation. CT images suggestive of calcification vs ICH vs related to hyperglycemia. Patient transferred out of the ICU and transferred to medicine for further care.      Plans for today;  -agitation slightly improved today  -transition from insulin drip to basal bolus regimen  -plan for MRI brain today and formal neurology consult    -continue with supportive nursing cares and delirium precautions       # RUE choreoathetosis, witnessed fall, Concern for ICH in patient with Dementia:  Pt. Presented to OSH after suffering a mechanical fall from standing (1/7/17) with reported head trauma and CT with concern for ICH. Family noted RU and possible RLE uncontrolled movement PTA. On admission to NICU CT with hyperdensity of the left putamen, diffuse cerebral volume loss and cerebral white matter changes consistent with chronic small vessel disease. Repeat CT with re demonstration of diffuse hyperdensity of the left putamen- likely remineralization, though f/u with MRI recommended. MRI attempted, but patient did not tolerate despite use of ativan.  Per discussion with Neurology, findings may be 2/2 hyperglycemia.      Overall the findings related to non ketotic hyperglycemia which can cause choreoathetoid movements     - continue with hyperglycemia management as below  -still delirious but improving   -fall precautions; delirium precautions   -trying to maintain sleep-wake cycle   -PT/OT involved   -metabolic workup so far negative  -resume aspirin   -continue with bedside sitter and will need TCU  -will need neurology follow up as an outpatient to see how  much improvement he will have just with blood glucose control       # Hyperglycemia, DMII     HHS:    A1c 14.1 on admission. Previously 6.9 in 7/2016. Admit with BG in mid 500's.  (AG 12-1/5/17). VBG 7.40/37/40/23. K 4.1 --> 3.6, Mg 2.1, Phos 3.8. BG low 200's on insulin gtt. S/p 2L NS and maintenance 0.45%. Ketones 1.1 (1/7/18) AG 14.   -transitioned to lantus and insulin with meals       #Eczema  Per nursing, patient with erythematous rash on bilateral UE with slight extension to the back. - Closely monitor  - started on triamcinolone 0.1% BID and emollient as per derm recs  -dermatology was consulted   -will need dermatology follow up as an outpatient     # Hypercalcemia: Ca on admission 10.9 --> 10.5--> 9.8.   - Continue IVF  -still high level of calcium corrected to 10.3, continue to monitor   -will send other work up like vitamin D level, PTH level , ionized calcium and phosphorus for am   -ionized calcium and calcium level was within normal range after hydration; pending PTH level     # Thrombocytopenia: Platelets 139 --> 111. Intermittently thrombocytopenic since at least 2013.   - likely dilution ; will continue to monitor       # HTN, CAD s/p CABG: No hx on file.    PTA ASA, lisinopril, HCTZ.   - If BP > 140 would give PRN hydralazine      # ANISHA on CKD: Cr on admission 3.30 BUN elevated to 70 BL appears  ~ 2.0. Cr down to 2.10 --> 2.25 with IVF. Suspect prerenal in setting of hypovolemia.   - also has known h/o CKD with uncontrolled diabetes  -some improvement with hydration but majority is CKD     # BPH: PTA Flomax.   - Continue PTA medication  - Bladder scan and Straight cath for PVR > 350       Diet: Regular Diet Adult  Fluids: IV fluids   DVT Prophylaxis: Low Risk/Ambulatory with no VTE prophylaxis indicated  Code Status: DNR/DNI    Disposition Plan   Expected discharge: 4 - 7 days, recommended to transitional care unit once off sitter and mental status is better      Entered: Cinthia Mckeon  01/10/2018, 1:30 PM   Information in the above section will display in the discharge planner report.      The patient's care was discussed with the Bedside Nurse and daughter     Cinthia Mckeon  Internal Medicine Staff Hospitalist Service  [facility]  Pager: [phone]   Please see sticky note for cross cover information    Interval History   Still confused overnight but more calmer in morning and has been resting in the morning;    Last 24 hr care team notes reviewed.   ROS:  4 point ROS including Respiratory, CV, GI and , other than that noted in the HPI, is negative.     Data reviewed today: I reviewed all medications, new labs and imaging results over the last 24 hours. I personally reviewed CT head with subtle asymmetric diffuse hyperdensity   Physical Exam   Vital Signs: Temp: 98  F (36.7  C) Temp src: Axillary BP: 130/58   Heart Rate: 78 Resp: 18 SpO2: 97 % O2 Device: None (Room air)    Weight: 160 lbs 11.45 oz  General Appearance: Pt confused but awake and alert   Respiratory: b/l equal breath sounds with no added sounds   Cardiovascular: s1s2 with no murmur   GI: soft, non tender, bowel sounds noted   Skin: presence of maculopapular rash in back and upper extremity   Neuro: AAOx1, b/l UE and LE-4/5; non focal           Data   Data     Recent Labs  Lab 01/09/18  0643 01/08/18 2022 01/08/18  0934 01/07/18 2000   WBC 5.9  --  7.8 8.1   HGB 11.5*  --  13.1* 13.5   MCV 93  --  92 88   *  --  111* 139*    140 140 132*   POTASSIUM 3.5 3.6 3.6 4.1   CHLORIDE 107 107 105 96   CO2 20 24 23 22   BUN 50* 54* 64* 70*   CR 2.10* 2.24* 2.71* 3.30*   ANIONGAP 11 8 12 14   LIZ 9.5 9.8 10.5* 10.9*   * 222* 228* 472*   ALBUMIN 3.0*  --   --  3.9   PROTTOTAL 5.6*  --   --  7.4   BILITOTAL 0.6  --   --  0.7   ALKPHOS 56  --   --  74   ALT 18  --   --  21   AST 22  --   --  20

## 2018-01-10 NOTE — PLAN OF CARE
Problem: Patient Care Overview  Goal: Plan of Care/Patient Progress Review  Outcome: No Change  Afebrile, VSS on RA, Pt disoriented to time and is agitated at times. Tylenol given for neck/shoulder pain. NS @ 100 ml/hr through PIV. BG , pt on algorithm 1. Pt pulled out PIV, new IV placed. Pt has some occasional jerking/involuntary movements in R upper and lower extremities. No neuro changes noted. Pt on a regular diet. Rash on R forearm. Pt voided 200 cc and was incontinent once. May have MRI.

## 2018-01-10 NOTE — PROGRESS NOTES
Diabetes Consult Daily  Progress Note          Assessment/Plan:    Christopher Cuevas is a 89 year old male with history of dementia, hypertension, CAD s/p CABG,  GERD, and a recent fall who was admitted to the NICU (1/7/2018) from OSH with concern for ICH. CT images suggestive of calcification vs ICH vs related to hyperglycemia.     Type 2 diabetes: poorly controlled with A1C > 14% ( 1/7/2018)  ANISHA on CKD: Cr on admission 3.30, Cr trending downward 2.71--> 2.24--> 2.10     Plan  - lantus 19 units  -d/c IV insulin 2 hours after sub-q insulin is given  - prandial insulin at 1 unit per 15 grams of CHO with meals and snacks  Novolog 1 unit per 40 > 140 before meals and > 200 HS  -monitor glucose before meals, HS and 0200    Will continue to follow                         Plan discussed with patient, family, and  bedside RN           Interval History:   The last 24 hours progress and nursing notes reviewed.    Transition off IV insulin, calculated ~ 1 unit per hour x 24 hours = 24 units with 20% decrease = 19 units basal insulin, meal and sliding scale.  Will need to determine a safe plan for discharge ( less risk for hypoglycemia due to dementia)  Current appetite is great, denies nausea and or vomiting  Discussed with daughter the plan of care and goal for discharge. All questions were answered.. Daughter reports the family is removing all of the sweets from his room at the assisted living      Recent Labs  Lab 01/10/18  0807 01/10/18  0704 01/10/18  0556 01/10/18  0503 01/10/18  0358 01/10/18  0308  01/09/18  0643  01/08/18  2022  01/08/18  0934  01/07/18 2000   GLC  --   --   --   --   --   --   --  147*  --  222*  --  228*  --  472*   * 110* 107* 125* 97 118*  < >  --   < >  --   < >  --   < >  --    < > = values in this interval not displayed.            Review of Systems:   See interval hx          Medications:       Active Diet Order      Regular Diet Adult     Physical Exam:  Gen: Alert,  "resting in bed, in NAD   HEENT:  mucous membranes are moist  Resp: Unlabored  Ext: moving all extremities  Neuro:oriented person ( place and time not assessed) communicating clearly, cognitively impaired  /58 (BP Location: Right arm)  Pulse 87  Temp 98  F (36.7  C) (Axillary)  Resp 18  Ht 1.727 m (5' 8\")  Wt 72.9 kg (160 lb 11.5 oz)  SpO2 97%  BMI 24.44 kg/m2           Data:     Lab Results   Component Value Date    A1C 14.1 01/08/2018              CBC RESULTS:   Recent Labs   Lab Test  01/09/18   0643   WBC  5.9   RBC  3.65*   HGB  11.5*   HCT  33.9*   MCV  93   MCH  31.5   MCHC  33.9   RDW  13.0   PLT  102*     Recent Labs   Lab Test  01/09/18 0643  01/08/18 2022   NA  137  140   POTASSIUM  3.5  3.6   CHLORIDE  107  107   CO2  20  24   ANIONGAP  11  8   GLC  147*  222*   BUN  50*  54*   CR  2.10*  2.24*   LIZ  9.5  9.8     Liver Function Studies -   Recent Labs   Lab Test  01/09/18 0643   PROTTOTAL  5.6*   ALBUMIN  3.0*   BILITOTAL  0.6   ALKPHOS  56   AST  22   ALT  18     No results found for: INR      I spent a total of 35 minutes bedside and on the inpatient unit managing the glycemic care of  Christopher Lizarraga 50% of my time on the unit was spent counseling the patient and patient family     Jordi Kothari -[phone]  Diabetes Management job code [id number]            "

## 2018-01-10 NOTE — PLAN OF CARE
Problem: Patient Care Overview  Goal: Plan of Care/Patient Progress Review  Outcome: No Change  0367-2756: Afebrile. OVSS on RA. Pt disoriented to time and situation this shift. Pt continues to be restless and anxious, bedside attendant present this shift due to the fact that the patient is pulling at his IV. Pt denies pain. Pt on regular diet, pt was able to eat about 25% of his dinner, no complaints of nausea. BS checks every hour due to patient being on the insulin drip. BS checks have been between 90s-210s, pt has been off the insulin drip for 3 hours this shift due to the fact that the patients sugars have been WNL. Pts blood sugar increased following eating dinner, pt was given 3 units of carb coverage. PIV R Hand infusing with NS at 100 mL/hr and insulin drip, IV does have some bleeding and minimal leaking around the site (no change from day shift), Vascular refused to place new IV on day shift. Pt has an urgency to void every 1-2 hours and gets anxious when he has to pee, every time pt has urinated it has been in small amounts  mL each time. Pt was bladder scanned at the beginning of the shift for 50 mL (read provider notification note). Pt was bladder scanned a second time around 2000 following an episode of urination to assess for retention and was bladder scanned for 55 mL. Pt has had around 400 mL urine output this shift, will continue to monitor urine output throughout the night. No BM this shift, per pt report he is passing flatus. Pt up with assist x2. Seizure pads added to patients bed due to the fact that pt is on seizure precautions. Call light in reach. Will continue to monitor and follow plan of care.

## 2018-01-10 NOTE — PLAN OF CARE
Problem: Patient Care Overview  Goal: Plan of Care/Patient Progress Review  Discharge Planner PT   Patient plan for discharge: Not discussed  Current status: Pt tx supine<>sit at EOB with min A. Pt tx sit<>stand with FWW with CGA and VCs. Pt amb ~100 ft with FWW and min A. Pt impulsive and requiring max verbal/tactile cues for safety.  Barriers to return to prior living situation: Impaired strength, motor planning, cognition, stability.  Recommendations for discharge: TCU  Rationale for recommendations: Progress strength, stability and safety with functional mobility prior to return to retirement.       Entered by: Samantha Esparza 01/10/2018 2:35 PM

## 2018-01-11 ENCOUNTER — APPOINTMENT (OUTPATIENT)
Dept: OCCUPATIONAL THERAPY | Facility: CLINIC | Age: 83
DRG: 637 | End: 2018-01-11
Attending: STUDENT IN AN ORGANIZED HEALTH CARE EDUCATION/TRAINING PROGRAM
Payer: COMMERCIAL

## 2018-01-11 LAB
ANION GAP SERPL CALCULATED.3IONS-SCNC: 8 MMOL/L (ref 3–14)
BASOPHILS # BLD AUTO: 0 10E9/L (ref 0–0.2)
BASOPHILS NFR BLD AUTO: 0.3 %
BUN SERPL-MCNC: 27 MG/DL (ref 7–30)
CA-I SERPL ISE-MCNC: 4.7 MG/DL (ref 4.4–5.2)
CALCIUM SERPL-MCNC: 8.3 MG/DL (ref 8.5–10.1)
CHLORIDE SERPL-SCNC: 115 MMOL/L (ref 94–109)
CO2 SERPL-SCNC: 18 MMOL/L (ref 20–32)
CREAT SERPL-MCNC: 1.65 MG/DL (ref 0.66–1.25)
D DIMER PPP FEU-MCNC: 1.4 UG/ML FEU (ref 0–0.5)
DIFFERENTIAL METHOD BLD: ABNORMAL
EOSINOPHIL # BLD AUTO: 0.1 10E9/L (ref 0–0.7)
EOSINOPHIL NFR BLD AUTO: 1.7 %
ERYTHROCYTE [DISTWIDTH] IN BLOOD BY AUTOMATED COUNT: 12.9 % (ref 10–15)
ERYTHROCYTE [DISTWIDTH] IN BLOOD BY AUTOMATED COUNT: 13.1 % (ref 10–15)
FERRITIN SERPL-MCNC: 614 NG/ML (ref 26–388)
GFR SERPL CREATININE-BSD FRML MDRD: 39 ML/MIN/1.7M2
GLUCOSE BLDC GLUCOMTR-MCNC: 140 MG/DL (ref 70–99)
GLUCOSE BLDC GLUCOMTR-MCNC: 155 MG/DL (ref 70–99)
GLUCOSE BLDC GLUCOMTR-MCNC: 166 MG/DL (ref 70–99)
GLUCOSE BLDC GLUCOMTR-MCNC: 192 MG/DL (ref 70–99)
GLUCOSE BLDC GLUCOMTR-MCNC: 198 MG/DL (ref 70–99)
GLUCOSE SERPL-MCNC: 218 MG/DL (ref 70–99)
HCT VFR BLD AUTO: 30.9 % (ref 40–53)
HCT VFR BLD AUTO: 30.9 % (ref 40–53)
HGB BLD-MCNC: 10.5 G/DL (ref 13.3–17.7)
HGB BLD-MCNC: 10.6 G/DL (ref 13.3–17.7)
IMM GRANULOCYTES # BLD: 0 10E9/L (ref 0–0.4)
IMM GRANULOCYTES NFR BLD: 0 %
IRON SATN MFR SERPL: 28 % (ref 15–46)
IRON SERPL-MCNC: 50 UG/DL (ref 35–180)
LDH SERPL L TO P-CCNC: 206 U/L (ref 85–227)
LYMPHOCYTES # BLD AUTO: 0.7 10E9/L (ref 0.8–5.3)
LYMPHOCYTES NFR BLD AUTO: 19.5 %
MCH RBC QN AUTO: 31.3 PG (ref 26.5–33)
MCH RBC QN AUTO: 31.9 PG (ref 26.5–33)
MCHC RBC AUTO-ENTMCNC: 34 G/DL (ref 31.5–36.5)
MCHC RBC AUTO-ENTMCNC: 34.3 G/DL (ref 31.5–36.5)
MCV RBC AUTO: 92 FL (ref 78–100)
MCV RBC AUTO: 93 FL (ref 78–100)
MONOCYTES # BLD AUTO: 0.2 10E9/L (ref 0–1.3)
MONOCYTES NFR BLD AUTO: 5.8 %
NEUTROPHILS # BLD AUTO: 2.5 10E9/L (ref 1.6–8.3)
NEUTROPHILS NFR BLD AUTO: 72.7 %
NRBC # BLD AUTO: 0 10*3/UL
NRBC BLD AUTO-RTO: 0 /100
PLATELET # BLD AUTO: 87 10E9/L (ref 150–450)
PLATELET # BLD AUTO: 91 10E9/L (ref 150–450)
POTASSIUM SERPL-SCNC: 3.9 MMOL/L (ref 3.4–5.3)
RBC # BLD AUTO: 3.32 10E12/L (ref 4.4–5.9)
RBC # BLD AUTO: 3.35 10E12/L (ref 4.4–5.9)
RETICS # AUTO: 63.4 10E9/L (ref 25–95)
RETICS/RBC NFR AUTO: 1.9 % (ref 0.5–2)
SODIUM SERPL-SCNC: 141 MMOL/L (ref 133–144)
TIBC SERPL-MCNC: 178 UG/DL (ref 240–430)
VIT B12 SERPL-MCNC: 502 PG/ML (ref 193–986)
WBC # BLD AUTO: 3.4 10E9/L (ref 4–11)
WBC # BLD AUTO: 3.6 10E9/L (ref 4–11)

## 2018-01-11 PROCEDURE — 82330 ASSAY OF CALCIUM: CPT | Performed by: INTERNAL MEDICINE

## 2018-01-11 PROCEDURE — 25000132 ZZH RX MED GY IP 250 OP 250 PS 637: Performed by: INTERNAL MEDICINE

## 2018-01-11 PROCEDURE — 99233 SBSQ HOSP IP/OBS HIGH 50: CPT | Performed by: INTERNAL MEDICINE

## 2018-01-11 PROCEDURE — 25000128 H RX IP 250 OP 636: Performed by: INTERNAL MEDICINE

## 2018-01-11 PROCEDURE — 00000146 ZZHCL STATISTIC GLUCOSE BY METER IP

## 2018-01-11 PROCEDURE — 40000611 ZZHCL STATISTIC MORPHOLOGY W/INTERP HEMEPATH TC 85060: Performed by: INTERNAL MEDICINE

## 2018-01-11 PROCEDURE — 97535 SELF CARE MNGMENT TRAINING: CPT | Mod: GO

## 2018-01-11 PROCEDURE — 85025 COMPLETE CBC W/AUTO DIFF WBC: CPT | Performed by: INTERNAL MEDICINE

## 2018-01-11 PROCEDURE — 97165 OT EVAL LOW COMPLEX 30 MIN: CPT | Mod: GO

## 2018-01-11 PROCEDURE — 82728 ASSAY OF FERRITIN: CPT | Performed by: INTERNAL MEDICINE

## 2018-01-11 PROCEDURE — 83615 LACTATE (LD) (LDH) ENZYME: CPT | Performed by: INTERNAL MEDICINE

## 2018-01-11 PROCEDURE — 82607 VITAMIN B-12: CPT | Performed by: INTERNAL MEDICINE

## 2018-01-11 PROCEDURE — 25000132 ZZH RX MED GY IP 250 OP 250 PS 637: Performed by: STUDENT IN AN ORGANIZED HEALTH CARE EDUCATION/TRAINING PROGRAM

## 2018-01-11 PROCEDURE — 85379 FIBRIN DEGRADATION QUANT: CPT | Performed by: INTERNAL MEDICINE

## 2018-01-11 PROCEDURE — 36415 COLL VENOUS BLD VENIPUNCTURE: CPT | Performed by: INTERNAL MEDICINE

## 2018-01-11 PROCEDURE — 25000128 H RX IP 250 OP 636: Performed by: STUDENT IN AN ORGANIZED HEALTH CARE EDUCATION/TRAINING PROGRAM

## 2018-01-11 PROCEDURE — 97530 THERAPEUTIC ACTIVITIES: CPT | Mod: GO

## 2018-01-11 PROCEDURE — 85045 AUTOMATED RETICULOCYTE COUNT: CPT | Performed by: INTERNAL MEDICINE

## 2018-01-11 PROCEDURE — 40000133 ZZH STATISTIC OT WARD VISIT

## 2018-01-11 PROCEDURE — 83540 ASSAY OF IRON: CPT | Performed by: INTERNAL MEDICINE

## 2018-01-11 PROCEDURE — 83550 IRON BINDING TEST: CPT | Performed by: INTERNAL MEDICINE

## 2018-01-11 PROCEDURE — 80048 BASIC METABOLIC PNL TOTAL CA: CPT | Performed by: INTERNAL MEDICINE

## 2018-01-11 PROCEDURE — 25000132 ZZH RX MED GY IP 250 OP 250 PS 637: Performed by: PHYSICIAN ASSISTANT

## 2018-01-11 PROCEDURE — 83010 ASSAY OF HAPTOGLOBIN QUANT: CPT | Performed by: INTERNAL MEDICINE

## 2018-01-11 PROCEDURE — 85027 COMPLETE CBC AUTOMATED: CPT | Performed by: INTERNAL MEDICINE

## 2018-01-11 PROCEDURE — 12000024 ZZH R&B TRANSPLANT CRITICAL

## 2018-01-11 RX ORDER — DIPHENHYDRAMINE HCL 25 MG
25 CAPSULE ORAL 3 TIMES DAILY PRN
Status: DISCONTINUED | OUTPATIENT
Start: 2018-01-11 | End: 2018-01-11

## 2018-01-11 RX ORDER — CETIRIZINE HYDROCHLORIDE 10 MG/1
10 TABLET ORAL 2 TIMES DAILY
Status: DISCONTINUED | OUTPATIENT
Start: 2018-01-11 | End: 2018-01-18 | Stop reason: HOSPADM

## 2018-01-11 RX ORDER — DIPHENHYDRAMINE HCL 25 MG
25 CAPSULE ORAL ONCE
Status: COMPLETED | OUTPATIENT
Start: 2018-01-11 | End: 2018-01-11

## 2018-01-11 RX ORDER — LANOLIN ALCOHOL/MO/W.PET/CERES
3 CREAM (GRAM) TOPICAL AT BEDTIME
Status: DISCONTINUED | OUTPATIENT
Start: 2018-01-11 | End: 2018-01-12

## 2018-01-11 RX ADMIN — TRIAMCINOLONE ACETONIDE: 1 OINTMENT TOPICAL at 08:46

## 2018-01-11 RX ADMIN — DIPHENHYDRAMINE HYDROCHLORIDE 25 MG: 25 CAPSULE ORAL at 02:26

## 2018-01-11 RX ADMIN — TAMSULOSIN HYDROCHLORIDE 0.4 MG: 0.4 CAPSULE ORAL at 08:46

## 2018-01-11 RX ADMIN — ACETAMINOPHEN 650 MG: 325 TABLET ORAL at 00:01

## 2018-01-11 RX ADMIN — Medication: at 13:11

## 2018-01-11 RX ADMIN — CETIRIZINE HYDROCHLORIDE 10 MG: 10 TABLET, FILM COATED ORAL at 11:07

## 2018-01-11 RX ADMIN — HYDRALAZINE HYDROCHLORIDE 20 MG: 20 INJECTION INTRAMUSCULAR; INTRAVENOUS at 09:39

## 2018-01-11 RX ADMIN — SODIUM CHLORIDE: 9 INJECTION, SOLUTION INTRAVENOUS at 09:43

## 2018-01-11 RX ADMIN — Medication: at 19:04

## 2018-01-11 RX ADMIN — CETIRIZINE HYDROCHLORIDE 10 MG: 10 TABLET, FILM COATED ORAL at 19:05

## 2018-01-11 RX ADMIN — MELATONIN TAB 3 MG 3 MG: 3 TAB at 21:31

## 2018-01-11 RX ADMIN — DIPHENHYDRAMINE HYDROCHLORIDE 25 MG: 25 CAPSULE ORAL at 19:04

## 2018-01-11 RX ADMIN — INSULIN ASPART 5 UNITS: 100 INJECTION, SOLUTION INTRAVENOUS; SUBCUTANEOUS at 13:13

## 2018-01-11 RX ADMIN — ASPIRIN 81 MG CHEWABLE TABLET 81 MG: 81 TABLET CHEWABLE at 08:46

## 2018-01-11 RX ADMIN — MUPIROCIN: 2 CREAM TOPICAL at 19:05

## 2018-01-11 RX ADMIN — INSULIN GLARGINE 19 UNITS: 100 INJECTION, SOLUTION SUBCUTANEOUS at 08:53

## 2018-01-11 RX ADMIN — MUPIROCIN: 2 CREAM TOPICAL at 08:46

## 2018-01-11 RX ADMIN — TRIAMCINOLONE ACETONIDE: 1 OINTMENT TOPICAL at 19:05

## 2018-01-11 RX ADMIN — Medication: at 08:48

## 2018-01-11 RX ADMIN — DIPHENHYDRAMINE HYDROCHLORIDE 25 MG: 25 CAPSULE ORAL at 08:46

## 2018-01-11 RX ADMIN — PRAVASTATIN SODIUM 20 MG: 20 TABLET ORAL at 19:05

## 2018-01-11 NOTE — PLAN OF CARE
Problem: Patient Care Overview  Goal: Plan of Care/Patient Progress Review  Outcome: No Change  2218-9011: VSS on RA. Blood sugars 303 and 198. C/o pain in right upper and lower extremities d/t skin rash. Benadryl PO given once per on call physician order. Patient seemed to have relief for a couple hours but is now restless again. Denies nausea. Tolerating a regular diet. PIV infusing NS at 75mL/hr. Voiding on demand. No BM overnight. Able to ambulate to and from restroom with assist x1 and walker. Attendant at bedside. Would not recommend D/Cing attendant at this time as patient is very restless and moves his legs in and out of bed a lot.

## 2018-01-11 NOTE — PROGRESS NOTES
"CLINICAL NUTRITION SERVICES - ASSESSMENT NOTE     Nutrition Prescription    RECOMMENDATIONS FOR MDs/PROVIDERS TO ORDER:  None at this time     Malnutrition Status:    Unable to assess: this writer was unable to visit with patient     Recommendations already ordered by Registered Dietitian (RD):  Ensure plus with meals     Future/Additional Recommendations:  1. Oral intake of meals and nutritional supplements     REASON FOR ASSESSMENT  Christopher Cuevas is a/an 89 year old male assessed by the dietitian for Provider Order - concern protein energy malnutrition due to acute illness    NUTRITION HISTORY  Patient was soundly sleeping when this writer attempted to visit. Per care attendant at bedside patient has been eating well. She needs to assist him with his utensils but otherwise he is doing well with po intake. This AM he had oatmeal, juice and milk     CURRENT NUTRITION ORDERS  Diet: Regular  Intake/Tolerance: > 75% of TID meals     LABS  Labs reviewed  (1/10): phos 2.2 mg/dL (L)    MEDICATIONS  Medications reviewed    ANTHROPOMETRICS  Height: 172.7 cm (5' 8\")  Most Recent Weight: 72.9 kg (160 lb 11.5 oz)    IBW: 69.2 kg  BMI: Normal BMI  Weight History:   Wt Readings from Last 10 Encounters:   01/08/18 72.9 kg (160 lb 11.5 oz)   01/07/18 86.2 kg (190 lb)   Dosing Weight: 73 kg (admit wt)    ASSESSED NUTRITION NEEDS  Estimated Energy Needs: 9429-7592 kcals/day (25 - 30 kcals/kg)  Justification: Maintenance  Estimated Protein Needs: 73-88 grams protein/day (1 - 1.2 grams of pro/kg)  Justification: Maintenance   Estimated Fluid Needs: 1 mL/kcal  Justification: Maintenance    PHYSICAL FINDINGS  See malnutrition section below.    MALNUTRITION  % Intake: Unable to assess  % Weight Loss: Unable to assess  Subcutaneous Fat Loss: Unable to assess  Muscle Loss: Unable to assess  Fluid Accumulation/Edema: trace per chart review  Malnutrition Diagnosis: Unable to assess: this writer was unable to visit with patient     NUTRITION " DIAGNOSIS  Predicted inadequate nutrient intake calorie/protein related to needs assistance with utensils to take oral intake, potential interruptions to diet order      INTERVENTIONS  Implementation  1. Nutrition Education: Unable to complete due to patient sleeping    2. Medical food supplement therapy   - ensure plus with meals     Goals  Patient to consume % of nutritionally adequate meal trays TID, or the equivalent with supplements/snacks.     Monitoring/Evaluation  Progress toward goals will be monitored and evaluated per protocol.    Lourdes Gonsalez MS/RD/LD/CNSC  [address]A RD Pager: [phone]

## 2018-01-11 NOTE — PROGRESS NOTES
[facility], [place]    Internal Medicine Progress Note - Gold Service      Assessment & Plan   Christopher Cuevas is a 89 year old male admitted on 1/8/2018. He has PMH of Dementia, HTN, CAD s/p CABG, GERD, BPH and recent witnessed fall who was admitted to the NICU (1/7/17) from OSH with concern for ICH on CT. Patient admitted to NICU for further evaluation. CT images suggestive of calcification vs ICH vs related to hyperglycemia. Patient transferred out of the ICU and transferred to medicine for further care.      Plans for today;  -agitation slightly improved today  -transition from insulin drip to basal bolus regimen- will still needs adjustment of the insulin regimen   -continue with supportive nursing cares and delirium precautions   -still delirious and needing sitter  -started on melatonin and cetrizine 10 mg PO BID with PRN benadryl   -triamcinolone ointment and emollient cream continue       # RUE choreoathetosis, witnessed fall, Concern for ICH in patient with Dementia:  #Altered mental status   Pt. Presented to OSH after suffering a mechanical fall from standing (1/7/17) with reported head trauma and CT with concern for ICH. Family noted RU and possible RLE uncontrolled movement PTA. On admission to NICU CT with hyperdensity of the left putamen, diffuse cerebral volume loss and cerebral white matter changes consistent with chronic small vessel disease. Repeat CT with re demonstration of diffuse hyperdensity of the left putamen- likely remineralization, though f/u with MRI recommended.   Overall the findings related to non ketotic hyperglycemia which can cause choreoathetoid movements   MRI obtained that showed asymmetric hyperdensity in the basal ganglia on CT (which is likely related to prolonged hyperglycemia)    - continue with hyperglycemia management as below  -still delirious but improving   -fall precautions; delirium precautions   -trying to maintain sleep-wake cycle  -started on melatonin   -PT/OT involved   -metabolic workup so far negative  -resume aspirin   -continue with bedside sitter and will need TCU  -will need neurology follow up as an outpatient to see how much improvement he will have just with blood glucose control       # Hyperglycemia, DMII     HHS:    A1c 14.1 on admission. Previously 6.9 in 7/2016. Admit with BG in mid 500's.  (AG 12-1/5/17). VBG 7.40/37/40/23. K 4.1 --> 3.6, Mg 2.1, Phos 3.8. BG low 200's on insulin gtt. S/p 2L NS and maintenance 0.45%. Ketones 1.1 (1/7/18) AG 14.   -transitioned to lantus and insulin with meals   -will need to increase lantus (discussed with endo and will make adjustments)      #Eczema  Per nursing, patient with erythematous rash on bilateral UE with slight extension to the back. - Closely monitor  - started on triamcinolone 0.1% BID and emollient as per derm recs  -dermatology was consulted   -will need dermatology follow up as an outpatient  -started on cetrizine 10 mg PO BID      # Hypercalcemia: Ca on admission 10.9 --> 10.5--> 9.8.   -resolved   - Continue IVF  -still high level of calcium corrected to 10.3, continue to monitor   --ionized calcium and calcium level was within normal range after hydration; pending PTH level and vitamin D level      # Pancytopenia : Platelets 139 --> 111, also noted hemoglobin and WBC is dropping  -still could be dilutional with IV fluids  -work up initiated with vitamin b12, iron panel and ferritin, LDH, haptoglobin, d-dimer, peripheral smear and retics         # HTN, CAD s/p CABG: No hx on file.    PTA ASA, lisinopril, HCTZ.   -will continue to hold HCTZ and lisinopril due to dehydration;      # ANISHA on CKD: Cr on admission 3.30 BUN elevated to 70 BL appears  ~ 2.0. Cr down to 2.10 --> 1.6 with IVF. Suspect prerenal in setting of hypovolemia.   - also has known h/o CKD with uncontrolled diabetes  -some improvement with hydration but majority is CKD  -in euvolemic state- will d/c IV  fluids      # BPH: PTA Flomax.        Diet: Regular Diet Adult  Snacks/Supplements Adult: Ensure Plus (Adult); With Meals  Fluids: IV fluids   DVT Prophylaxis: Low Risk/Ambulatory with no VTE prophylaxis indicated  Code Status: DNR/DNI    Disposition Plan   Expected discharge: 4 - 7 days, recommended to transitional care unit once off sitter and mental status is better      Entered: Cinthia Mckeon 01/11/2018, 12:44 PM   Information in the above section will display in the discharge planner report.      The patient's care was discussed with the Bedside Nurse and daughter     Cinthia Mckeon  Internal Medicine Staff Hospitalist Service  [facility]  Pager: [phone]   Please see sticky note for cross cover information    Interval History   Still confused overnight but more calmer in morning and has been resting in the morning; on going rash in upper extremity and restlessness and still needs sitter     Last 24 hr care team notes reviewed.   ROS:  4 point ROS including Respiratory, CV, GI and , other than that noted in the HPI, is negative.     Data reviewed today: I reviewed all medications, new labs and imaging results over the last 24 hours. I personally reviewed CT head with subtle asymmetric diffuse hyperdensity   Physical Exam   Vital Signs: Temp: 98.3  F (36.8  C) Temp src: Oral BP: 127/65   Heart Rate: 97 Resp: 18 SpO2: 99 % O2 Device: None (Room air)    Weight: 160 lbs 11.45 oz  General Appearance: Pt confused but awake and alert   Respiratory: b/l equal breath sounds with no added sounds   Cardiovascular: s1s2 with no murmur   GI: soft, non tender, bowel sounds noted   Skin: presence of maculopapular rash in back and upper extremity ; worsening redness erythema in right upper extremity   Neuro: AAOx1, b/l UE and LE-4/5; non focal           Data   Data     Recent Labs  Lab 01/11/18  0806 01/10/18  1049 01/09/18  0643  01/08/18  0934 01/07/18 2000   WBC 3.6*  --  5.9  --  7.8 8.1   HGB 10.5*   --  11.5*  --  13.1* 13.5   MCV 92  --  93  --  92 88   PLT 87*  --  102*  --  111* 139*    143 137  < > 140 132*   POTASSIUM 3.9 3.7 3.5  < > 3.6 4.1   CHLORIDE 115* 111* 107  < > 105 96   CO2 18* 20 20  < > 23 22   BUN 27 35* 50*  < > 64* 70*   CR 1.65* 1.88* 2.10*  < > 2.71* 3.30*   ANIONGAP 8 12 11  < > 12 14   LIZ 8.3* 8.6 9.5  < > 10.5* 10.9*   * 126* 147*  < > 228* 472*   ALBUMIN  --   --  3.0*  --   --  3.9   PROTTOTAL  --   --  5.6*  --   --  7.4   BILITOTAL  --   --  0.6  --   --  0.7   ALKPHOS  --   --  56  --   --  74   ALT  --   --  18  --   --  21   AST  --   --  22  --   --  20   < > = values in this interval not displayed.

## 2018-01-11 NOTE — PROGRESS NOTES
"   01/11/18 1422   Quick Adds   Type of Visit Initial Occupational Therapy Evaluation   Living Environment   Lives With spouse   Living Arrangements assisted living   Living Environment Comment Background information obtained from chart, as pt unable to provide.    Self-Care   Equipment Currently Used at Home walker, rolling   Functional Level Prior   Ambulation 0-->independent   Transferring 0-->independent   Toileting 0-->independent   Bathing 3-->assistive equipment and person   Dressing 0-->independent   Eating 0-->independent   Cognition 1 - attention or memory deficits   Prior Functional Level Comment pt and wife with h/o dementia. Unclear as to what pt's PLOF was. No family members present. Per , \"Pt and wife receive \"level 2\" services at their detention, which includes 1hr/day assistance with bathing, meds, escorting to meals. Pt has a walker, but does not use it. \"   General Information   Onset of Illness/Injury or Date of Surgery - Date 01/08/18   Referring Physician Gadiel Ko MD   Patient/Family Goals Statement Not stated.    Additional Occupational Profile Info/Pertinent History of Current Problem Christopher Cuevas is a 89 year old male admitted on 1/8/2018. He has PMH of Dementia, HTN, CAD s/p CABG, GERD, BPH and recent witnessed fall who was admitted to the NICU (1/7/17) from OSH with concern for ICH on CT. Patient admitted to NICU for further evaluation. CT images suggestive of calcification vs ICH vs related to hyperglycemia. Patient transferred out of the ICU and transferred to medicine for further care.    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    Cognitive Status Examination   Orientation person   Level of Consciousness confused   Able to Follow Commands moderate impairment   Personal Safety (Cognitive) severe impairment;at risk " "behaviors demonstrated;decreased awareness, need for assist;decreased awareness, need for safety;decreased insight to deficits;impulsive;one on one supervision required for safety   Memory impaired   Attention Distractible during evaluation   Cognitive Comment Did not know he was in the hospital. After reorientation to place and situation, pt was able to state he was in the hospital because \"I may have bumped my head.\" Has dementia at baseline, per chart.    Sensory Examination   Sensory Comments unknown.    Pain Assessment   Patient Currently in Pain No   Range of Motion (ROM)   ROM Comment B UE appears WNL.   Strength   Strength Comments At least 3/5, was unable to formally assess 2/2 cognition.    Hand Strength   Hand Strength Comments Fair .    Transfer Skill: Sit to Stand   Level of [place]: Sit/Stand minimum assist (75% patients effort)  (CGA/min A + max v/c )   Physical Assist/Nonphysical Assist: Sit/Stand 1 person assist   Toilet Transfer   Toilet Transfer Comments Requiring step by step v/c for safety with FWW and use of grab bar/safe technique for transfer. Sit to stand with CGA/min A from toilet, max v/c for technique.    Lower Body Dressing   Level of [place]: Dress Lower Body (doff socks sitting on toilet, v/c and SBA, don Max A)   Instrumental Activities of Daily Living (IADL)   IADL Comments It appears that pt has meals provided + meds managed at [facility]. Unclear regarding assist with other IADLs.    Activities of Daily Living Analysis   Impairments Contributing to Impaired Activities of Daily Living balance impaired;cognition impaired;coordination impaired;strength decreased   General Therapy Interventions   Planned Therapy Interventions ADL retraining;bed mobility training;cognition;strengthening;transfer training;home program guidelines;progressive activity/exercise   Clinical Impression   Criteria for Skilled Therapeutic Interventions Met yes, treatment indicated   OT Diagnosis Decreased " "ADL I   Influenced by the following impairments cognition, deconditioning, balance   Assessment of Occupational Performance 3-5 Performance Deficits   Identified Performance Deficits functional mobility, functional transfers, dressing, toileting, g/h   Clinical Decision Making (Complexity) Low complexity   Therapy Frequency 5 times/wk   Predicted Duration of Therapy Intervention (days/wks) 1 week   Anticipated Discharge Disposition Transitional Care Facility   Risks and Benefits of Treatment have been explained. Yes   Patient, Family & other staff in agreement with plan of care Yes   Clinical Impression Comments Pt to benefit from skilled OT to address above deficits and facilitate return to highest level of ADL I. See daily flowsheet for details regarding tx provided.    [facility] AM-PAC  \"6 Clicks\" Daily Activity Inpatient Short Form   1. Putting on and taking off regular lower body clothing? 2 - A Lot   2. Bathing (including washing, rinsing, drying)? 2 - A Lot   3. Toileting, which includes using toilet, bedpan or urinal? 2 - A Lot   4. Putting on and taking off regular upper body clothing? 2 - A Lot   5. Taking care of personal grooming such as brushing teeth? 2 - A Lot   6. Eating meals? 2 - A Lot   Daily Activity Raw Score (Score out of 24.Lower scores equate to lower levels of function) 12   Total Evaluation Time   Total Evaluation Time (Minutes) 5     "

## 2018-01-11 NOTE — PLAN OF CARE
Problem: Patient Care Overview  Goal: Plan of Care/Patient Progress Review  PT 7A: CANCEL; pt sleeping upon two attempts this date. Will reschedule.

## 2018-01-11 NOTE — PLAN OF CARE
Problem: Patient Care Overview  Goal: Plan of Care/Patient Progress Review  Discharge Planner OT   Patient plan for discharge: unknown.  Current status: Pt oriented to self only. Pt max A to don socks, was able to remove while sitting on toilet with v/c and close SBA. Pt impulsive and with choreoathetosis of R UE/LE interfering in tx. Pt completed sit to stands from bed and toilet with CGA/min A and max v/c for safe t/f technique and hand placement. Pt able to ambulate to/from BR, stood at sink x ~5 minutes x 2 with CGA. Pt with poor safety awareness, requiring frequent direction for safe FWW use. Pt incontinent, sat on toilet with max v/c for technique, mod A to pull down brief. Change brief with max A. Able to brush teeth and wash hands with step by step v/c. Difficulty coordinating movements bringing toothbrush to mouth. Returned to bed, sitter present.   Barriers to return to prior living situation: Cognition, deconditioning, decreased safety awareness and appears below baseline for ADLs (information limited to what is in the chart as family not present)  Recommendations for discharge: TCU  Rationale for recommendations: To promote strengthening, functional endurance, and return to ADL I.        Entered by: Agnieszka Dockery 01/11/2018 2:36 PM     OT 7A

## 2018-01-11 NOTE — PROGRESS NOTES
"                     Diabetes Consult Daily  Progress Note          Assessment/Plan:    Christopher Cuevas is a 89 year old male with history of dementia, hypertension, CAD s/p CABG,  GERD, and a recent fall who was admitted to the NICU (1/7/2018) from OSH with concern for ICH. CT images suggestive of calcification vs ICH vs related to hyperglycemia.      Type 2 diabetes: poorly controlled with A1C > 14% ( 1/7/2018)  ANISHA on CKD: Cr on admission 3.30, Cr trending downward to 1.65 today      Plan  - increase lantus from  19 units to 23 units daily am ( gave additional 4 units today)  - increased prandial insulin from  1 unit per 15 to 1 unit per 10  grams of CHO with meals and snacks ( starting at lunch)  -  D/c Novolog 1 unit per 40 > 140 before meals and > 200 HS  -Change to Novolog 1 unit per 35 > 140 before meals and > 200 HS  -monitor glucose before meals, HS and 0200  - having good glucose control is important, but due to age and underlying dementia, would run blood sugars higher to prevent hypoglycemia.      Will continue to follow                                   .           Interval History:   The last 24 hours progress and nursing notes reviewed.  Transitioned off IV insulin yesterday, blood sugars are moderately controlled  glucose at ~ 0200 198 and fasting 218, increased basal rate by 20%    Patient was sleeping talked with sitter in the room. Mr. Cuevas is having difficulty with feeding himself due to the involuntary right hand movements. He was also complaining of having to \"think\" to swallow.  Appetite  reported as good      Recent Labs  Lab 01/11/18  0806 01/11/18  0229 01/10/18  2122 01/10/18  1603 01/10/18  1152 01/10/18  1100 01/10/18  1049 01/10/18  1003  01/09/18  0643  01/08/18  2022  01/08/18  0934  01/07/18 2000   *  --   --   --   --   --  126*  --   --  147*  --  222*  --  228*  --  472*   BGM  --  198* 303* 177* 174* 122*  --  145*  < >  --   < >  --   < >  --   < >  --    < > = values " "in this interval not displayed.            Review of Systems:   See interval hx          Medications:       Active Diet Order      Regular Diet Adult     Physical Exam:  Gen:  NAD   HEENT:  mucous membranes are moist  Resp: Unlabored  Neuro: sleeping  /65 (BP Location: Right arm)  Pulse 87  Temp 98.3  F (36.8  C) (Oral)  Resp 18  Ht 1.727 m (5' 8\")  Wt 72.9 kg (160 lb 11.5 oz)  SpO2 99%  BMI 24.44 kg/m2           Data:     Lab Results   Component Value Date    A1C 14.1 01/08/2018              CBC RESULTS:   Recent Labs   Lab Test  01/11/18   0806   WBC  3.6*   RBC  3.35*   HGB  10.5*   HCT  30.9*   MCV  92   MCH  31.3   MCHC  34.0   RDW  12.9   PLT  87*     Recent Labs   Lab Test  01/11/18   0806  01/10/18   1049   NA  141  143   POTASSIUM  3.9  3.7   CHLORIDE  115*  111*   CO2  18*  20   ANIONGAP  8  12   GLC  218*  126*   BUN  27  35*   CR  1.65*  1.88*   LIZ  8.3*  8.6     Liver Function Studies -   Recent Labs   Lab Test  01/09/18   0643   PROTTOTAL  5.6*   ALBUMIN  3.0*   BILITOTAL  0.6   ALKPHOS  56   AST  22   ALT  18     No results found for: INR    Matiasjacobroberto Kothari -[id number]  Diabetes Management job code [id number]            "

## 2018-01-11 NOTE — PLAN OF CARE
Problem: Patient Care Overview  Goal: Plan of Care/Patient Progress Review  Outcome: No Change  Afebrile; hypertensive (max 166/87) treated with PRN IV Hydralazine x1 with good result. Other vital signs stable on RA. Denies pain but c/o itching on right arm and leg. Rash present - Zyrtec, PRN benadryl and Kenalog, vanicream, and bactroban applied. Itching seems to persist. Involuntary movements continue in RUE and RLE. Denies nausea. Tolerating regular diet with good appetite.  and 192. Lantus and sliding scale/carb coverage given per orders. NS at 75 mL/hr via PIV. Follow up AXR completed. Patient up independently in room. Will continue with plan of care.

## 2018-01-11 NOTE — PLAN OF CARE
"Problem: Patient Care Overview  Goal: Plan of Care/Patient Progress Review  Outcome: No Change  /70 (BP Location: Left arm)  Pulse 87  Temp 97.8  F (36.6  C) (Axillary)  Resp 16  Ht 1.727 m (5' 8\")  Wt 72.9 kg (160 lb 11.5 oz)  SpO2 97%  BMI 24.44 kg/m2     [id number]: Continues with AMS, however, appears improved compared to previous report. Disoriented to time, situation and becoming increasingly agitated as the day progressed. Attendant at bedside for line protection/impulsivity. AVSS on RA. Patient denies pain and nausea. Voiding frequently and in small amounts. Patient reported to NST pain with urination, but denied any pain with urination when RN questioned him. Soft small/med BM x2. Regular diet with fair appetite. Insulin gtt DC at 1200, -177 corrected per orders. L PIV with NS at 75 mL/hr. Rash primarily on R side of body (RUE/RLE), dermatology consulted (see note). Patient worked with PT today. Up with 1-2 assist and gait belt/walker. Unsteady due to involuntary movements of RUE. Head MRI completed today (see results). Neurology following. Will continue with POC, notify team with changes/concerns.          "

## 2018-01-12 ENCOUNTER — APPOINTMENT (OUTPATIENT)
Dept: OCCUPATIONAL THERAPY | Facility: CLINIC | Age: 83
DRG: 637 | End: 2018-01-12
Attending: PSYCHIATRY & NEUROLOGY
Payer: COMMERCIAL

## 2018-01-12 ENCOUNTER — APPOINTMENT (OUTPATIENT)
Dept: PHYSICAL THERAPY | Facility: CLINIC | Age: 83
DRG: 637 | End: 2018-01-12
Attending: PSYCHIATRY & NEUROLOGY
Payer: COMMERCIAL

## 2018-01-12 ENCOUNTER — APPOINTMENT (OUTPATIENT)
Dept: CT IMAGING | Facility: CLINIC | Age: 83
DRG: 637 | End: 2018-01-12
Attending: INTERNAL MEDICINE
Payer: COMMERCIAL

## 2018-01-12 LAB
ANION GAP SERPL CALCULATED.3IONS-SCNC: 8 MMOL/L (ref 3–14)
BASOPHILS # BLD AUTO: 0 10E9/L (ref 0–0.2)
BASOPHILS NFR BLD AUTO: 0.3 %
BUN SERPL-MCNC: 19 MG/DL (ref 7–30)
CALCIUM SERPL-MCNC: 8.3 MG/DL (ref 8.5–10.1)
CHLORIDE SERPL-SCNC: 116 MMOL/L (ref 94–109)
CO2 SERPL-SCNC: 20 MMOL/L (ref 20–32)
COPATH REPORT: NORMAL
CREAT SERPL-MCNC: 1.53 MG/DL (ref 0.66–1.25)
DEPRECATED CALCIDIOL+CALCIFEROL SERPL-MC: <15 UG/L (ref 20–75)
DIFFERENTIAL METHOD BLD: ABNORMAL
EOSINOPHIL # BLD AUTO: 0.1 10E9/L (ref 0–0.7)
EOSINOPHIL NFR BLD AUTO: 2 %
ERYTHROCYTE [DISTWIDTH] IN BLOOD BY AUTOMATED COUNT: 13.3 % (ref 10–15)
GFR SERPL CREATININE-BSD FRML MDRD: 43 ML/MIN/1.7M2
GLUCOSE BLDC GLUCOMTR-MCNC: 103 MG/DL (ref 70–99)
GLUCOSE BLDC GLUCOMTR-MCNC: 113 MG/DL (ref 70–99)
GLUCOSE BLDC GLUCOMTR-MCNC: 116 MG/DL (ref 70–99)
GLUCOSE BLDC GLUCOMTR-MCNC: 69 MG/DL (ref 70–99)
GLUCOSE BLDC GLUCOMTR-MCNC: 72 MG/DL (ref 70–99)
GLUCOSE BLDC GLUCOMTR-MCNC: 79 MG/DL (ref 70–99)
GLUCOSE SERPL-MCNC: 102 MG/DL (ref 70–99)
HAPTOGLOB SERPL-MCNC: 69 MG/DL (ref 35–175)
HCT VFR BLD AUTO: 32 % (ref 40–53)
HGB BLD-MCNC: 10.8 G/DL (ref 13.3–17.7)
IMM GRANULOCYTES # BLD: 0 10E9/L (ref 0–0.4)
IMM GRANULOCYTES NFR BLD: 0.3 %
LYMPHOCYTES # BLD AUTO: 1.1 10E9/L (ref 0.8–5.3)
LYMPHOCYTES NFR BLD AUTO: 26.7 %
MCH RBC QN AUTO: 31.5 PG (ref 26.5–33)
MCHC RBC AUTO-ENTMCNC: 33.8 G/DL (ref 31.5–36.5)
MCV RBC AUTO: 93 FL (ref 78–100)
MONOCYTES # BLD AUTO: 0.4 10E9/L (ref 0–1.3)
MONOCYTES NFR BLD AUTO: 8.8 %
NEUTROPHILS # BLD AUTO: 2.5 10E9/L (ref 1.6–8.3)
NEUTROPHILS NFR BLD AUTO: 61.9 %
NRBC # BLD AUTO: 0 10*3/UL
NRBC BLD AUTO-RTO: 0 /100
PLATELET # BLD AUTO: 82 10E9/L (ref 150–450)
POTASSIUM SERPL-SCNC: 3.6 MMOL/L (ref 3.4–5.3)
RBC # BLD AUTO: 3.43 10E12/L (ref 4.4–5.9)
SODIUM SERPL-SCNC: 143 MMOL/L (ref 133–144)
VITAMIN D2 SERPL-MCNC: <5 UG/L
VITAMIN D3 SERPL-MCNC: 10 UG/L
WBC # BLD AUTO: 4 10E9/L (ref 4–11)

## 2018-01-12 PROCEDURE — 40000193 ZZH STATISTIC PT WARD VISIT

## 2018-01-12 PROCEDURE — 25000132 ZZH RX MED GY IP 250 OP 250 PS 637: Performed by: STUDENT IN AN ORGANIZED HEALTH CARE EDUCATION/TRAINING PROGRAM

## 2018-01-12 PROCEDURE — 25000132 ZZH RX MED GY IP 250 OP 250 PS 637: Performed by: PHYSICIAN ASSISTANT

## 2018-01-12 PROCEDURE — 97530 THERAPEUTIC ACTIVITIES: CPT | Mod: GP

## 2018-01-12 PROCEDURE — 25000132 ZZH RX MED GY IP 250 OP 250 PS 637: Performed by: INTERNAL MEDICINE

## 2018-01-12 PROCEDURE — 36415 COLL VENOUS BLD VENIPUNCTURE: CPT | Performed by: INTERNAL MEDICINE

## 2018-01-12 PROCEDURE — 74176 CT ABD & PELVIS W/O CONTRAST: CPT

## 2018-01-12 PROCEDURE — 25000128 H RX IP 250 OP 636: Performed by: STUDENT IN AN ORGANIZED HEALTH CARE EDUCATION/TRAINING PROGRAM

## 2018-01-12 PROCEDURE — 12000025 ZZH R&B TRANSPLANT INTERMEDIATE

## 2018-01-12 PROCEDURE — 84166 PROTEIN E-PHORESIS/URINE/CSF: CPT | Performed by: INTERNAL MEDICINE

## 2018-01-12 PROCEDURE — 97116 GAIT TRAINING THERAPY: CPT | Mod: GP

## 2018-01-12 PROCEDURE — 00000146 ZZHCL STATISTIC GLUCOSE BY METER IP

## 2018-01-12 PROCEDURE — 84165 PROTEIN E-PHORESIS SERUM: CPT | Performed by: INTERNAL MEDICINE

## 2018-01-12 PROCEDURE — 85025 COMPLETE CBC W/AUTO DIFF WBC: CPT | Performed by: INTERNAL MEDICINE

## 2018-01-12 PROCEDURE — 97530 THERAPEUTIC ACTIVITIES: CPT | Mod: GO

## 2018-01-12 PROCEDURE — 00000402 ZZHCL STATISTIC TOTAL PROTEIN: Performed by: INTERNAL MEDICINE

## 2018-01-12 PROCEDURE — 40000133 ZZH STATISTIC OT WARD VISIT

## 2018-01-12 PROCEDURE — 80048 BASIC METABOLIC PNL TOTAL CA: CPT | Performed by: INTERNAL MEDICINE

## 2018-01-12 PROCEDURE — 99233 SBSQ HOSP IP/OBS HIGH 50: CPT | Performed by: INTERNAL MEDICINE

## 2018-01-12 RX ORDER — TRAZODONE HYDROCHLORIDE 50 MG/1
50 TABLET, FILM COATED ORAL AT BEDTIME
Status: DISCONTINUED | OUTPATIENT
Start: 2018-01-12 | End: 2018-01-13

## 2018-01-12 RX ORDER — AMLODIPINE BESYLATE 5 MG/1
5 TABLET ORAL DAILY
Status: DISCONTINUED | OUTPATIENT
Start: 2018-01-12 | End: 2018-01-18 | Stop reason: HOSPADM

## 2018-01-12 RX ADMIN — Medication: at 20:49

## 2018-01-12 RX ADMIN — Medication: at 07:53

## 2018-01-12 RX ADMIN — ASPIRIN 81 MG CHEWABLE TABLET 81 MG: 81 TABLET CHEWABLE at 07:52

## 2018-01-12 RX ADMIN — CETIRIZINE HYDROCHLORIDE 10 MG: 10 TABLET, FILM COATED ORAL at 07:51

## 2018-01-12 RX ADMIN — Medication: at 13:05

## 2018-01-12 RX ADMIN — IMIQUIMOD 0.25 G: 12.5 CREAM TOPICAL at 08:04

## 2018-01-12 RX ADMIN — TRIAMCINOLONE ACETONIDE: 1 OINTMENT TOPICAL at 20:49

## 2018-01-12 RX ADMIN — TRAZODONE HYDROCHLORIDE 50 MG: 50 TABLET ORAL at 20:49

## 2018-01-12 RX ADMIN — CETIRIZINE HYDROCHLORIDE 10 MG: 10 TABLET, FILM COATED ORAL at 20:49

## 2018-01-12 RX ADMIN — MUPIROCIN: 2 CREAM TOPICAL at 20:49

## 2018-01-12 RX ADMIN — PRAVASTATIN SODIUM 20 MG: 20 TABLET ORAL at 20:49

## 2018-01-12 RX ADMIN — TRIAMCINOLONE ACETONIDE: 1 OINTMENT TOPICAL at 07:52

## 2018-01-12 RX ADMIN — HYDRALAZINE HYDROCHLORIDE 10 MG: 20 INJECTION INTRAMUSCULAR; INTRAVENOUS at 07:51

## 2018-01-12 RX ADMIN — MUPIROCIN: 2 CREAM TOPICAL at 07:52

## 2018-01-12 RX ADMIN — TAMSULOSIN HYDROCHLORIDE 0.4 MG: 0.4 CAPSULE ORAL at 07:53

## 2018-01-12 ASSESSMENT — VISUAL ACUITY: OU: GLASSES

## 2018-01-12 NOTE — PROGRESS NOTES
"                     Diabetes Consult Daily  Progress Note          Assessment/Plan:                           Christopher Cuevas is a 89 year old male with history of dementia, hypertension, CAD s/p CABG,  GERD, and a recent fall who was admitted to the NICU (1/7/2018) from OSH with concern for ICH. CT images suggestive of calcification vs ICH vs related to hyperglycemia.    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    Type 2 diabetes: poorly controlled with A1C > 14% ( 1/7/2018)  ANISHA on CKD: Cr on admission 3.30, Cr trending downward to 1.53today      Plan  - decreased lantus from 23 units to 20 daily am ( will start 1/13)  - decreased prandial insulin from  1 unit per 10 to 1 unit per 15  grams of CHO with meals and snacks ( starting at lunch)  -  resumed Novolog 1 unit per 40 > 140 before meals and > 200 HS  - d/c Novolog 1 unit per 35 > 140 before meals and > 200 HS  -monitor glucose before meals, HS and 0200  - having good glucose control is important, but due to age and underlying dementia, would recommend target blood sugars 125 to < 200 ( instead of ) to prevent hypoglycemia     Will continue to follow                                  Interval History:   The last 24 hours progress and nursing notes reviewed.  Blood sugars are in good control, but tightly controlled glcusoe at ~ 0200, 113 and fasting 102  Appetite is reported as fair, but per family and staff, having\" issues with swallowing\".  No reports of nausea and or vomiting        Recent Labs  Lab 01/12/18  0754 01/12/18  0705 01/12/18  0202 01/11/18  2133 01/11/18  1908 01/11/18  1451 01/11/18  1042 01/11/18  0806  01/10/18  1049  01/09/18  0643  01/08/18  2022  01/08/18  0934   GLC  --  102*  --   --   --   --   --  218*  --  126*  --  147*  --  222*  --  228*   *  --  113* 140* 155* 166* 192*  --   < >  --   < >  --   < >  --   < >  --    < > = values in this interval not displayed.            Review of Systems:   See interval hx          " "Medications:       Active Diet Order      Regular Diet Adult     Physical Exam:  Gen:  NAD   HEENT: mucous membranes are moist  Resp: Unlabored  Neuro:oriented person, underlying dementia  /71 (BP Location: Right arm)  Pulse 84  Temp 98.6  F (37  C) (Oral)  Resp 18  Ht 1.727 m (5' 8\")  Wt 72.9 kg (160 lb 11.5 oz)  SpO2 98%  BMI 24.44 kg/m2           Data:     Lab Results   Component Value Date    A1C 14.1 01/08/2018              CBC RESULTS:   Recent Labs   Lab Test  01/12/18   0705   WBC  4.0   RBC  3.43*   HGB  10.8*   HCT  32.0*   MCV  93   MCH  31.5   MCHC  33.8   RDW  13.3   PLT  82*     Recent Labs   Lab Test  01/12/18   0705  01/11/18   0806   NA  143  141   POTASSIUM  3.6  3.9   CHLORIDE  116*  115*   CO2  20  18*   ANIONGAP  8  8   GLC  102*  218*   BUN  19  27   CR  1.53*  1.65*   LIZ  8.3*  8.3*     Liver Function Studies -   Recent Labs   Lab Test  01/09/18   0643   PROTTOTAL  5.6*   ALBUMIN  3.0*   BILITOTAL  0.6   ALKPHOS  56   AST  22   ALT  18     No results found for: INR        Jordi Kothari -[phone]  Diabetes Management job code [id number]            "

## 2018-01-12 NOTE — PLAN OF CARE
Problem: Patient Care Overview  Goal: Plan of Care/Patient Progress Review  Discharge Planner PT   Patient plan for discharge: Not stated  Current status: Impulsive however redirectable. Pt pleasant throughout session. Family members present. 2nd assist from NST for safety during session. Supine > sit w/ CGA. STS w/ FWW and CGA, difficulty w/ placing RUE on FWW however able to do so w/o assist or cueing 75% of time. Focus on gait training, pt w/ exaggerated RLE swing phase and heel strike, no LOB or LE buckling, improvement in maintaining COM w/i NATASHA of FWW. Min A x 1 w/ WC follow 100', tends to lift FWW due to choreo RUE, no overt LOB.   Barriers to return to prior living situation: M[place] condition, cognition, balance, strength, endurance   Recommendations for discharge: TCU  Rationale for recommendations: To improve above impairments          Entered by: Moon Basurto 01/12/2018 11:45 AM

## 2018-01-12 NOTE — PLAN OF CARE
Problem: Patient Care Overview  Goal: Plan of Care/Patient Progress Review  Outcome: No Change  V/S/S on RA. BG ( 166, 155) insulin coverage was given via sliding scale and carb coverage. Denies pain and nausea. Given PRN benadryl for itchiness. Rash all over body. Regular diet, fair appetite, just needs time to eat it. Had urine incontinence x1. PIV infusing with NS @ 75 ml/hr. Up with assist of one and walker. Has a sitter due to patients involuntary movements on right side and increased confusion. Seizure precautions. Continue with plan of care. Notify MD of any changes.

## 2018-01-12 NOTE — PLAN OF CARE
Problem: Patient Care Overview  Goal: Plan of Care/Patient Progress Review  Outcome: No Change  Afebrile, VSS on RA. Pt disoriented to time, pt agitated at times and confused. Involuntary movements of R upper and lower extremities. PIV saline locked. . Pt has rash on L side of body.  ml. No BM overnight. Sitter in room d/t agitation, confusion.

## 2018-01-12 NOTE — PLAN OF CARE
Problem: Patient Care Overview  Goal: Plan of Care/Patient Progress Review  Outcome: No Change  Afebrile; hypertensive this AM (max 146/71). PRN IV Hydralazine given. Patient's BP's soft this afternoon (99/50) but within ordered parameters. Norvasc ordered but held per writer's discretion. Patient denies pain but c/o itching in right arm and leg. Creams applied with slight relief. Denies nausea. Tolerating regular diet with fair appetite.  and 116 not requiring sliding scale. Carb coverage given for breakfast but held this afternoon d/t patient's refusal for PO intake. PIV SL'd. Adequate UOP and no BM. Seizure precautions maintained. Patient had CT of abdomen/chest this morning to r/o malignancy r/t recent hypercalcemia. Results pending. Bedside attendant present entire shift. Speech eval ordered - awaiting arrival. Will continue with plan of care.

## 2018-01-12 NOTE — PROGRESS NOTES
Brief Progress Note:    S:  Patient still with scaly plaques on extremities and abdomen. Not much improved with 1.5 days of triamcinolone.    O:  Skin focused exam on trunk and extremities performed:  Scaly pink plaque on lateral RLE, as well as on the RUE with evidence of excoriated papules and linear excoriations.  R side of abdomen with scaly pink patch  -No other lesions on concern    A/P:  1. Eczematous type rash- Appears to be 2/2 eczema craquele with possible component of nutritional deficiency. Not much improved with 1.5 days of triamcinolone. This is not unexpected. Would still continue course of treatment. For more robust response, would cover areas where triamcinolone is applied under occlusion with Saran wrap.  -Continue triamcinolone 0.1% ointment BID and wrap with Saran wrap  -Continue vanicream  -Optimize nutritional status    2. History of NMSC/?melanoma  -F/u with dermatology outpatient    If rash worsens, please let us know, otherwise we will follow peripherally. Let us know when discharging so can schedule follow-up in dermatology as outpatient.    Kiara Zelaya MD  Internal Medicine-Dermatology, PGY-2    Above reviewed. Agree with recommendations.     Tatiana An MD  Professor and Chair  Department of Dermatology  [facility]

## 2018-01-12 NOTE — PROGRESS NOTES
[facility], [place]    Internal Medicine Progress Note - Gold Service      Assessment & Plan   Christopher Cuevas is a 89 year old male admitted on 1/8/2018. He has PMH of Dementia, HTN, CAD s/p CABG, GERD, BPH and recent witnessed fall who was admitted to the NICU (1/7/17) from OSH with concern for ICH on CT. Patient admitted to NICU for further evaluation. CT images suggestive of calcification vs ICH vs related to hyperglycemia. Patient transferred out of the ICU and transferred to medicine for further care.      Plans for today;  -agitation improving and persistent right upper and lower extremity clonus   -transition from insulin drip to basal bolus regimen  -continue with supportive nursing cares and delirium precautions   -still delirious and needing sitter  -resumed trazodone at night for sleep   - cetrizine 10 mg PO BID  -triamcinolone ointment and emollient cream continue   -CT chest , abdomen and SPEP, UPEP pending       # RUE choreoathetosis, witnessed fall, Concern for ICH in patient with Dementia:  #Altered mental status   Pt. Presented to OSH after suffering a mechanical fall from standing (1/7/17) with reported head trauma and CT with concern for ICH. Family noted RU and possible RLE uncontrolled movement PTA. On admission to NICU CT with hyperdensity of the left putamen, diffuse cerebral volume loss and cerebral white matter changes consistent with chronic small vessel disease. Repeat CT with re demonstration of diffuse hyperdensity of the left putamen- likely remineralization, though f/u with MRI recommended.   Overall the findings related to non ketotic hyperglycemia which can cause choreoathetoid movements   MRI obtained that showed asymmetric hyperdensity in the basal ganglia on CT (which is likely related to prolonged hyperglycemia)    - continue with hyperglycemia management as below  -still delirious but improving   -fall precautions; delirium precautions   -trying to  maintain sleep-wake cycle -continued with trazodone 50 mg   -PT/OT involved   -metabolic workup so far negative  -resume aspirin   -continue with bedside sitter and will need TCU  -will need neurology follow up as an outpatient to see how much improvement he will have just with blood glucose control  -ongoing chorea which will take time so holding off on starting medications for that        # Hyperglycemia, DMII     HHS:    A1c 14.1 on admission. Previously 6.9 in 7/2016. Admit with BG in mid 500's.  (AG 12-1/5/17). VBG 7.40/37/40/23. K 4.1 --> 3.6, Mg 2.1, Phos 3.8. BG low 200's on insulin gtt. S/p 2L NS and maintenance 0.45%. Ketones 1.1 (1/7/18) AG 14.   -transitioned to lantus and insulin with meals   -will need to increase lantus (discussed with endo and will make adjustments)      #Eczema  Per nursing, patient with erythematous rash on bilateral UE with slight extension to the back. - Closely monitor  - started on triamcinolone 0.1% BID and emollient as per derm recs  -dermatology was consulted   -will need dermatology follow up as an outpatient  -started on cetrizine 10 mg PO BID      # Hypercalcemia: Ca on admission 10.9 --> 10.5--> 9.8.   -resolved   - Continue IVF  -still high level of calcium corrected to 10.3, continue to monitor and now persistently to 8   --ionized calcium and calcium level was within normal range after hydration; pending PTH level and vitamin D level     Concern for hypercalcemia which can be attributed by HCTZ, along with delirium and chorea; daughter did mention that he had weight loss in 6 months   To r/o underlying malignancy sent for CT chest, abdomen and pelvis     # Pancytopenia : Platelets 139 --> 111, also noted hemoglobin and WBC is dropping  -still could be dilutional with IV fluids  -work up initiated with vitamin b12, iron panel and ferritin, LDH, haptoglobin, d-dimer, peripheral smear and retics   -so far negative and no concern for hemolysis  -retics is  inappropriate for the level of anemia which could be related to CKD        # HTN, CAD s/p CABG: No hx on file.    PTA ASA, lisinopril, HCTZ.   -will continue to hold HCTZ and lisinopril due to dehydration;   -started on amlodipine 5mg and eventually can resume lisinopril if creatinine remains stable      # ANISHA on CKD: Cr on admission 3.30 BUN elevated to 70 BL appears  ~ 2.0. Cr down to 2.10 --> 1.6 with IVF. Suspect prerenal in setting of hypovolemia.   - also has known h/o CKD with uncontrolled diabetes  -some improvement with hydration but majority is CKD  -in euvolemic state- will d/c IV fluids   -creatinine to 1.53 which is near to baseline     # BPH: PTA Flomax.        Diet: Regular Diet Adult  Snacks/Supplements Adult: Ensure Plus (Adult); With Meals  Fluids: IV fluids   DVT Prophylaxis: chemical prophylaxis holding off due to low platelets   Code Status: DNR/DNI    Disposition Plan   Expected discharge: 4 - 7 days, recommended to transitional care unit once off sitter and mental status is better      Entered: Cinthia Mckeon 01/12/2018, 12:48 PM   Information in the above section will display in the discharge planner report.      The patient's care was discussed with the Bedside Nurse and daughter     Amyesther AnnMike  Internal Medicine Staff Hospitalist Service  [facility]  Pager: [phone]   Please see sticky note for cross cover information    Interval History   Still confused overnight but more calmer in morning and has been resting in the morning; on going rash in upper extremity which is better  and restlessness and still needs sitter     Last 24 hr care team notes reviewed.   ROS:  4 point ROS including Respiratory, CV, GI and , other than that noted in the HPI, is negative.     Data reviewed today: I reviewed all medications, new labs and imaging results over the last 24 hours. I personally reviewed CT head with subtle asymmetric diffuse hyperdensity   Physical Exam   Vital Signs:  Temp: 98.3  F (36.8  C) Temp src: Oral BP: 107/58 Pulse: 84 Heart Rate: 97 Resp: 16 SpO2: 98 % O2 Device: None (Room air)    Weight: 160 lbs 11.45 oz  General Appearance: Pt confused but awake and alert   Respiratory: b/l equal breath sounds with no added sounds   Cardiovascular: s1s2 with no murmur   GI: soft, non tender, bowel sounds noted   Skin: presence of maculopapular rash in back and upper extremity ; better in terms of redness in right upper extremity  Neuro: AAOx1, b/l UE and LE-4/5; non focal           Data   Data     Recent Labs  Lab 01/12/18  0705 01/11/18  1347 01/11/18  0806 01/10/18  1049 01/09/18  0643  01/07/18 2000   WBC 4.0 3.4* 3.6*  --  5.9  < > 8.1   HGB 10.8* 10.6* 10.5*  --  11.5*  < > 13.5   MCV 93 93 92  --  93  < > 88   PLT 82* 91* 87*  --  102*  < > 139*     --  141 143 137  < > 132*   POTASSIUM 3.6  --  3.9 3.7 3.5  < > 4.1   CHLORIDE 116*  --  115* 111* 107  < > 96   CO2 20  --  18* 20 20  < > 22   BUN 19  --  27 35* 50*  < > 70*   CR 1.53*  --  1.65* 1.88* 2.10*  < > 3.30*   ANIONGAP 8  --  8 12 11  < > 14   LIZ 8.3*  --  8.3* 8.6 9.5  < > 10.9*   *  --  218* 126* 147*  < > 472*   ALBUMIN  --   --   --   --  3.0*  --  3.9   PROTTOTAL  --   --   --   --  5.6*  --  7.4   BILITOTAL  --   --   --   --  0.6  --  0.7   ALKPHOS  --   --   --   --  56  --  74   ALT  --   --   --   --  18  --  21   AST  --   --   --   --  22  --  20   < > = values in this interval not displayed.

## 2018-01-12 NOTE — PROGRESS NOTES
Social Work Services Progress Note    Hospital Day: 5  Date of Initial Social Work Evaluation:  1/9/2018  Collaborated with:  Pts daughter, TCU's.    Data:  Pt from [facility]. Needs TCU at discharge.    Intervention:  SW provided medicare compare list to pts daughter. They would like SW to send referrals to the following facilities. SW sent referrals and left voicemails.    1. [facility] ([facility] P: [phone], F: [phone].  2. [facility] ([place]). P: [phone], F: [phone].  3. [facility]. P: [phone], F: [phone].    Assessment: Pt daughters open to different options, agreeable to TCU.    Plan:    Anticipated Disposition: TCU.    Barriers to d/c plan:  Medical stability.    Follow Up:  SW will continue to follow for discharge needs.    JANICE Bolton, LGSW  7A   Ph: [phone], Pager: [phone]

## 2018-01-12 NOTE — PLAN OF CARE
Problem: Patient Care Overview  Goal: Plan of Care/Patient Progress Review  Discharge Planner OT   Patient plan for discharge: unknown.   Current status: Pt sat up at EOB with min-mod A and step by step v/c. Pt able to don hearing aids with some difficulty using R UE and dropping of aids though was able to complete task with additional time. Pt then stated he was tired and needed a nap, declined further activity. STS with min A and FWW, sidestep to HOB with v/c and CGA, rolled supine with close SBA. Sitter present.   Barriers to return to prior living situation: choreoathetosis, weakness, cognition/impulsivity.   Recommendations for discharge: TCU  Rationale for recommendations: To promote highest level of ADL I.        Entered by: Agnieszka Dockery 01/12/2018 2:51 PM     OT 7A

## 2018-01-13 ENCOUNTER — APPOINTMENT (OUTPATIENT)
Dept: PHYSICAL THERAPY | Facility: CLINIC | Age: 83
DRG: 637 | End: 2018-01-13
Attending: PSYCHIATRY & NEUROLOGY
Payer: COMMERCIAL

## 2018-01-13 ENCOUNTER — APPOINTMENT (OUTPATIENT)
Dept: SPEECH THERAPY | Facility: CLINIC | Age: 83
DRG: 637 | End: 2018-01-13
Attending: INTERNAL MEDICINE
Payer: COMMERCIAL

## 2018-01-13 LAB
ANION GAP SERPL CALCULATED.3IONS-SCNC: 11 MMOL/L (ref 3–14)
BUN SERPL-MCNC: 17 MG/DL (ref 7–30)
CALCIUM SERPL-MCNC: 8.6 MG/DL (ref 8.5–10.1)
CHLORIDE SERPL-SCNC: 118 MMOL/L (ref 94–109)
CO2 SERPL-SCNC: 17 MMOL/L (ref 20–32)
CREAT SERPL-MCNC: 1.44 MG/DL (ref 0.66–1.25)
ERYTHROCYTE [DISTWIDTH] IN BLOOD BY AUTOMATED COUNT: 13.6 % (ref 10–15)
GFR SERPL CREATININE-BSD FRML MDRD: 46 ML/MIN/1.7M2
GLUCOSE BLDC GLUCOMTR-MCNC: 110 MG/DL (ref 70–99)
GLUCOSE BLDC GLUCOMTR-MCNC: 128 MG/DL (ref 70–99)
GLUCOSE BLDC GLUCOMTR-MCNC: 137 MG/DL (ref 70–99)
GLUCOSE BLDC GLUCOMTR-MCNC: 148 MG/DL (ref 70–99)
GLUCOSE BLDC GLUCOMTR-MCNC: 151 MG/DL (ref 70–99)
GLUCOSE BLDC GLUCOMTR-MCNC: 190 MG/DL (ref 70–99)
GLUCOSE BLDC GLUCOMTR-MCNC: 271 MG/DL (ref 70–99)
GLUCOSE BLDC GLUCOMTR-MCNC: 66 MG/DL (ref 70–99)
GLUCOSE BLDC GLUCOMTR-MCNC: 78 MG/DL (ref 70–99)
GLUCOSE BLDC GLUCOMTR-MCNC: 84 MG/DL (ref 70–99)
GLUCOSE BLDC GLUCOMTR-MCNC: 90 MG/DL (ref 70–99)
GLUCOSE SERPL-MCNC: 149 MG/DL (ref 70–99)
HCT VFR BLD AUTO: 31.8 % (ref 40–53)
HGB BLD-MCNC: 10.8 G/DL (ref 13.3–17.7)
LACTATE BLD-SCNC: 2 MMOL/L (ref 0.7–2)
MCH RBC QN AUTO: 31.7 PG (ref 26.5–33)
MCHC RBC AUTO-ENTMCNC: 34 G/DL (ref 31.5–36.5)
MCV RBC AUTO: 93 FL (ref 78–100)
PLATELET # BLD AUTO: 84 10E9/L (ref 150–450)
POTASSIUM SERPL-SCNC: 3.7 MMOL/L (ref 3.4–5.3)
RBC # BLD AUTO: 3.41 10E12/L (ref 4.4–5.9)
SODIUM SERPL-SCNC: 146 MMOL/L (ref 133–144)
WBC # BLD AUTO: 3.7 10E9/L (ref 4–11)

## 2018-01-13 PROCEDURE — 25000132 ZZH RX MED GY IP 250 OP 250 PS 637: Performed by: INTERNAL MEDICINE

## 2018-01-13 PROCEDURE — 80048 BASIC METABOLIC PNL TOTAL CA: CPT | Performed by: INTERNAL MEDICINE

## 2018-01-13 PROCEDURE — 00000146 ZZHCL STATISTIC GLUCOSE BY METER IP

## 2018-01-13 PROCEDURE — 12000025 ZZH R&B TRANSPLANT INTERMEDIATE

## 2018-01-13 PROCEDURE — 40000141 ZZH STATISTIC PERIPHERAL IV START W/O US GUIDANCE

## 2018-01-13 PROCEDURE — 97530 THERAPEUTIC ACTIVITIES: CPT | Mod: GP | Performed by: PHYSICAL THERAPIST

## 2018-01-13 PROCEDURE — 40000193 ZZH STATISTIC PT WARD VISIT: Performed by: PHYSICAL THERAPIST

## 2018-01-13 PROCEDURE — 97116 GAIT TRAINING THERAPY: CPT | Mod: GP | Performed by: PHYSICAL THERAPIST

## 2018-01-13 PROCEDURE — 25000132 ZZH RX MED GY IP 250 OP 250 PS 637: Performed by: PHYSICIAN ASSISTANT

## 2018-01-13 PROCEDURE — 40000225 ZZH STATISTIC SLP WARD VISIT: Performed by: SPEECH-LANGUAGE PATHOLOGIST

## 2018-01-13 PROCEDURE — 92610 EVALUATE SWALLOWING FUNCTION: CPT | Mod: GN | Performed by: SPEECH-LANGUAGE PATHOLOGIST

## 2018-01-13 PROCEDURE — 36415 COLL VENOUS BLD VENIPUNCTURE: CPT | Performed by: DERMATOLOGY

## 2018-01-13 PROCEDURE — 99233 SBSQ HOSP IP/OBS HIGH 50: CPT | Performed by: INTERNAL MEDICINE

## 2018-01-13 PROCEDURE — 36415 COLL VENOUS BLD VENIPUNCTURE: CPT | Performed by: INTERNAL MEDICINE

## 2018-01-13 PROCEDURE — 25000132 ZZH RX MED GY IP 250 OP 250 PS 637: Performed by: STUDENT IN AN ORGANIZED HEALTH CARE EDUCATION/TRAINING PROGRAM

## 2018-01-13 PROCEDURE — 83605 ASSAY OF LACTIC ACID: CPT | Performed by: DERMATOLOGY

## 2018-01-13 PROCEDURE — 25000128 H RX IP 250 OP 636: Performed by: STUDENT IN AN ORGANIZED HEALTH CARE EDUCATION/TRAINING PROGRAM

## 2018-01-13 PROCEDURE — 85027 COMPLETE CBC AUTOMATED: CPT | Performed by: INTERNAL MEDICINE

## 2018-01-13 RX ORDER — TRAZODONE HYDROCHLORIDE 50 MG/1
100 TABLET, FILM COATED ORAL AT BEDTIME
Status: DISCONTINUED | OUTPATIENT
Start: 2018-01-13 | End: 2018-01-13

## 2018-01-13 RX ORDER — MULTIPLE VITAMINS W/ MINERALS TAB 9MG-400MCG
1 TAB ORAL DAILY
Status: DISCONTINUED | OUTPATIENT
Start: 2018-01-13 | End: 2018-01-18 | Stop reason: HOSPADM

## 2018-01-13 RX ORDER — QUETIAPINE FUMARATE 25 MG/1
25 TABLET, FILM COATED ORAL DAILY
Status: DISCONTINUED | OUTPATIENT
Start: 2018-01-13 | End: 2018-01-13

## 2018-01-13 RX ORDER — TRAZODONE HYDROCHLORIDE 50 MG/1
50 TABLET, FILM COATED ORAL AT BEDTIME
Status: DISCONTINUED | OUTPATIENT
Start: 2018-01-13 | End: 2018-01-18 | Stop reason: HOSPADM

## 2018-01-13 RX ADMIN — Medication: at 13:36

## 2018-01-13 RX ADMIN — TRIAMCINOLONE ACETONIDE: 1 OINTMENT TOPICAL at 08:54

## 2018-01-13 RX ADMIN — MULTIPLE VITAMINS W/ MINERALS TAB 1 TABLET: TAB at 13:35

## 2018-01-13 RX ADMIN — HYDRALAZINE HYDROCHLORIDE 20 MG: 20 INJECTION INTRAMUSCULAR; INTRAVENOUS at 13:35

## 2018-01-13 RX ADMIN — AMLODIPINE BESYLATE 5 MG: 5 TABLET ORAL at 08:33

## 2018-01-13 RX ADMIN — CETIRIZINE HYDROCHLORIDE 10 MG: 10 TABLET, FILM COATED ORAL at 08:34

## 2018-01-13 RX ADMIN — Medication 12.5 MG: at 16:15

## 2018-01-13 RX ADMIN — SALINE NASAL SPRAY 1 SPRAY: 1.5 SOLUTION NASAL at 08:32

## 2018-01-13 RX ADMIN — CETIRIZINE HYDROCHLORIDE 10 MG: 10 TABLET, FILM COATED ORAL at 19:58

## 2018-01-13 RX ADMIN — ACETAMINOPHEN 650 MG: 325 TABLET ORAL at 16:15

## 2018-01-13 RX ADMIN — MUPIROCIN: 2 CREAM TOPICAL at 19:59

## 2018-01-13 RX ADMIN — ASPIRIN 81 MG CHEWABLE TABLET 81 MG: 81 TABLET CHEWABLE at 08:33

## 2018-01-13 RX ADMIN — TRIAMCINOLONE ACETONIDE: 1 OINTMENT TOPICAL at 19:59

## 2018-01-13 RX ADMIN — MUPIROCIN: 2 CREAM TOPICAL at 08:54

## 2018-01-13 RX ADMIN — Medication: at 19:59

## 2018-01-13 RX ADMIN — TRAZODONE HYDROCHLORIDE 50 MG: 50 TABLET ORAL at 21:24

## 2018-01-13 RX ADMIN — Medication: at 08:54

## 2018-01-13 RX ADMIN — VITAMIN D, TAB 1000IU (100/BT) 1000 UNITS: 25 TAB at 13:35

## 2018-01-13 RX ADMIN — PRAVASTATIN SODIUM 20 MG: 20 TABLET ORAL at 19:58

## 2018-01-13 RX ADMIN — TAMSULOSIN HYDROCHLORIDE 0.4 MG: 0.4 CAPSULE ORAL at 08:33

## 2018-01-13 NOTE — PLAN OF CARE
Problem: Patient Care Overview  Goal: Plan of Care/Patient Progress Review  Outcome: No Change  Afeb., OVSS.   B-110. Hypoglycemia treated with juice & crackers. Pt. confused & disoriented x4. Pt. denies pain or nausea. Pt. up with assist of 1 to bathroom & voided into toilet, 1 formed stool this shift.  Left PIV saline locked.  Attendant at bedside d/t confusion & restlessness.  Pt. slept intermittently overnight.  Continue to monitor closely & follow POC.

## 2018-01-13 NOTE — CONSULTS
"Urology Consult    Name: Christopher Cuevas    MRN: [id number]   YOB: 1928       We were asked to see Christopher Cuevas at the request of Dr. Mckeon for evaluation and treatment of the following chief complaint.        Chief Complaint:   \"multiple obstructing renal stones\"    History is obtained from the patient and chart review           History of Present Illness:   Christopher Cuevas is a 89 year old male with h/o HTN, CAD s/p CABG, and dementia who was transferred from OS for ICH on CT.  He presented to outside hospital after a fall during which he struck his head and concern for stroke.  Urology were consulted for incidental finding of stone on CT scan. Patient denies pain, hematuria or dysuria. He thinks he passed a stone few years ago but didn't require any interventon. No back or flank pain. No fever or chills.            Past Medical History:     Past Medical History:   Diagnosis Date     Chronic kidney disease      Dementia      Dementia      Diabetes (H)      Gastroesophageal reflux disease      Hyperkalemia      Hyperlipidemia      Hypertension             Past Surgical History:   No past surgical history on file.         Social History:     Social History   Substance Use Topics     Smoking status: Former Smoker     Smokeless tobacco: Not on file      Comment: quit 45 years ago     Alcohol use Not on file            Allergies:   No Known Allergies         Medications:     Current Facility-Administered Medications   Medication     traZODone (DESYREL) tablet 100 mg     insulin aspart (NovoLOG) inj (RAPID ACTING)     insulin glargine (LANTUS) injection 20 Units     insulin aspart (NovoLOG) inj (RAPID ACTING)     insulin aspart (NovoLOG) inj (RAPID ACTING)     amLODIPine (NORVASC) tablet 5 mg     sodium chloride (OCEAN) 0.65 % nasal spray 1 spray     cetirizine (zyrTEC) tablet 10 mg     HYDROmorphone (PF) (DILAUDID) injection 1 mg     emollient (VANICREAM) cream     aspirin chewable tablet 81 mg     " ondansetron (ZOFRAN) injection 4 mg     acetaminophen (TYLENOL) tablet 650 mg     triamcinolone (KENALOG) 0.1 % ointment     insulin aspart (NovoLOG) inj (RAPID ACTING)     naloxone (NARCAN) injection 0.1-0.4 mg     hydrALAZINE (APRESOLINE) injection 10-20 mg     glucose 40 % gel 15-30 g    Or     dextrose 50 % injection 25-50 mL    Or     glucagon injection 1 mg     labetalol (NORMODYNE/TRANDATE) injection 20 mg     mupirocin (BACTROBAN) 2 % cream     imiquimod (ALDARA) 5 % cream 0.25 g     pravastatin (PRAVACHOL) tablet 20 mg     tamsulosin (FLOMAX) capsule 0.4 mg             Review of Systems:    ROS: 10 point ROS neg other than the symptoms noted above in the HPI           Physical Exam:   VS:  T: 98.2    HR: 87    BP: 144/78    RR: 20   GEN:  AOx3.  NAD.    CV:  RRR  LUNGS: Non-labored breathing.   BACK:  No midline or CVA tenderness.  ABD:  Soft.  NT.  ND.  No rebound or guarding.  No masses.  EXT:  Warm, well perfused.  no edema. Erythema with signs of scratching on LLE  SKIN:  Warm.  Dry.  No rashes.  NEURO:  CN grossly intact.            Data:   All laboratory data reviewed:      Recent Labs  Lab 01/13/18  0730 01/12/18  0705 01/11/18  1347 01/11/18  0806   WBC 3.7* 4.0 3.4* 3.6*   HGB 10.8* 10.8* 10.6* 10.5*   PLT 84* 82* 91* 87*       Recent Labs  Lab 01/13/18  0730 01/12/18  0705 01/11/18  0806 01/10/18  1049  01/08/18  0934   * 143 141 143  < > 140   POTASSIUM 3.7 3.6 3.9 3.7  < > 3.6   CHLORIDE 118* 116* 115* 111*  < > 105   CO2 17* 20 18* 20  < > 23   BUN 17 19 27 35*  < > 64*   CR 1.44* 1.53* 1.65* 1.88*  < > 2.71*   * 102* 218* 126*  < > 228*   LIZ 8.6 8.3* 8.3* 8.6  < > 10.5*   MAG  --   --   --   --   --  2.1   PHOS  --   --   --  2.2*  --  3.8   < > = values in this interval not displayed.    Recent Labs  Lab 01/07/18  8032   COLOR Yellow   APPEARANCE Clear   URINEGLC >1000*   URINEBILI Negative   URINEKETONE 5*   SG 1.012   URINEPH 5.5   PROTEIN 10*   NITRITE Negative   LEUKEST  Negative   RBCU 7*   WBCU 2       All pertinent imaging reviewed:    Results for orders placed or performed during the hospital encounter of 01/08/18   CT Head w/o Contrast    Narrative    CT HEAD W/O CONTRAST 1/8/2018 5:51 AM    Provided History: hemorrhage vs calcification on previous CT;     Comparison: 1/7/2018.    Technique: Using multidetector thin collimation helical acquisition  technique, axial, coronal and sagittal CT images from the skull base  to the vertex were obtained without intravenous contrast.     Findings:    Unchanged subtle diffuse hyperdensity of the left putamen comparison  with the right right putamen. No other area concerning for acute  hemorrhage. No mass effect or midline shift. Moderate diffuse cerebral  volume loss. The ventricles are proportionate to the cerebral sulci.  Extensive confluent areas of decreased density in the cerebral white  matter bilaterally that are consistent with sequela of chronic small  vessel ischemic disease. The basal cisterns are patent. No acute loss  of gray-white matter differentiation. Extensive calcified plaques  along the vertebral arteries. Small lacunar infarction in the left  caudate head.    The visualized paranasal sinuses are clear. The mastoid air cells are  clear.       Impression    Impression:   1. Redemonstration of subtle asymmetric diffuse hyperdensity of the  left putamen. This is more likely to reflect mineralization. Given the  clinical history of choeroathetosis, an MRI would be helpful to  further investigate.     2. No new changes    3. Advanced parenchymal volume loss.    4. Extensive chronic white matter microvascular ischemic changes.     I have personally reviewed the examination and initial interpretation  and I agree with the findings.    MADHURI FOREMAN MD   MR Brain w/o Contrast    Addendum: 1/10/2018    Asymmetric hyperdensity in the basal ganglia on CT, especially the  putamen, when associated with T1 hyperintensity on MRI has  been seen  in patients with prolonged hyperglycemia.    AKSHAT CURTIS MD      Narrative    MR BRAIN W/O CONTRAST 1/10/2018 9:54 AM    History: Eval for asymmetric L putamenal hyperdensity on CT;     Comparison:  Head CT 1/8/2008     Technique: Sagittal T1-weighted and axial fat-saturated T2-weighted,  fat-saturated T2 FLAIR, susceptibility weighted images and  diffusion-weighted images with ADC map of the brain were obtained  without intravenous contrast.    Findings: There is diffuse generalized cerebral/cerebellar volume loss  along with moderate findings of chronic small vessel ischemic disease,  primarily involving the periventricular white matter. No evidence of  acute infarct on diffusion-weighted imaging. 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    Bilateral susceptibility artifacts in the globi pallidi and putamina  and slight asymmetric T1 hyperintensity in the left basal ganglia  area. Correlated with head CT from 2 days ago and phase maps of  susceptibility weighted images, this is most likely secondary to  asymmetric mineralization of the basal ganglia.    No ventriculomegaly. No hydrocephalus. Patent major flow voids.  Atherosclerotic calcification surrounding the vertebrobasilar  arteries.    Paranasal sinuses are clear. Mild left mastoid effusion. Bilateral  pseudophakia. Otherwise orbital structures are unremarkable.      Impression    Impression:  1. Bilateral mineralization in the basal ganglia, greater on the left.  2. Diffuse generalized cerebral/cerebellar volume loss and  accompanying moderate leukoaraiosis.  3. No new intracranial findings since 1/8/2018    I have personally reviewed the examination and initial interpretation  and I agree with the findings.    AKSHAT CURTIS MD   CT Chest Abdomen Pelvis w/o Contrast    Narrative    EXAMINATION: CT CHEST ABDOMEN PELVIS W/O CONTRAST, 1/12/2018 10:57 AM    TECHNIQUE:  Helical CT  images from the thoracic inlet through the  symphysis pubis were obtained without contrast.    CONTRAST DOSE: No IV contrast was used    COMPARISON: No comparisons available    HISTORY: concern for hypercalcemia and altered mental status with  underlying malignancy;     FINDINGS:    Chest:     Respiratory motion artifact limits fine detail at the lung bases.    CABG.    Heterogeneous appearance of the thyroid. Heart size is within normal  limits. Extensive coronary calcifications/coronary stents. No  pericardial effusion. Scattered atherosclerotic calcifications of the  major cervical arterial origins, thoracic aorta and the abdominal  aorta. The pulmonary artery is not dilated. No thoracic  adenopathy.Small hiatal hernia.    Central tracheobronchial tree is patent.     Series 4, image 48: 4 mm left upper lobe nodule. Series 4, image 63:  tiny pleural-based nodule, but potentially lymphatic.    Small bilateral pleural effusions. No pneumothorax. Atelectasis.    Abdomen and pelvis:  Limited evaluation secondary to lack of IV contrast. Respiratory  motion artifact, most pronounced in the mid and upper abdomen.    A 2.3 cm simple left hepatic cyst. Otherwise normal appearance of the  liver and gallbladder. Extensive splenic arterial calcifications. The  spleen parenchyma is unremarkable. Negative left adrenal gland.  Indeterminate right adrenal nodule measures 1.5 x 1.5 cm.   Atrophic  pancreas. Numerous prominent renal cortical and renal sinus cysts  involving both kidneys with multiple bilateral fluid density cysts. A  right interpolar lesion is a thin calcified septation. Multiple right  lower pole renal stones measuring up to 2.6 cm in sum (series 8 image  70, series 9 image 70). Non-obstructive 8 mm distal right ureteral  stone (image 500). No left hydronephrosis. Mild distal left  hydroureter. No left ureteral stone. Normal appearance of the  pancreas. The distal left ureter is dilated (series 3, image  476),  measuring up to 1.6 cm. No definite obstructing stone or mass or stone  is seen on this noncontrast exam; however, nodular intravesicular  extrusion/protrusion of the prostate extends towards the left ureteral  orifice.    Irregular lobulated calcified prostate with protruding hypertrophied  median lobe and associated intravesicular nodular protrusion/extrusion  as above. Unremarkable urinary bladder.    Sigmoid diverticula without evidence of diverticulitis. Unremarkable  appendix. No bowel obstruction or abnormal bowel wall thickening.  No  ascites. No pneumatosis, portal venous gas or free air. No  abdominopelvic lymphadenopathy.    Bones and soft tissues:  Intact median sternotomy wires. No suspicious osseous findings. Mild  multilevel degenerative changes of thoracolumbar spine.      Impression    IMPRESSION:   Limited study secondary to lack of IV contrast.   1. Indeterminate right adrenal nodule measures up to 1.5 cm. If  further evaluation is desired, and if the patient can receive  intravenous contrast, consider follow up CT following an adrenal  nodule protocol.  2. Prostate is enlarged and irregular with median lobe hypertrophy.  Nonspecific, but most commonly secondary to BPH.   3. Multiple nonobstructing right renal stones including numerous right  lower pole stones which when combined measure up to 2.6 cm.  Non-obstructive 8 mm distal right ureteral stone.  4. At least moderate dilatation of the distal left ureter without  definitive obstructing mass or stone. Nodular left prostate and to the  left ureteral orifice and may be causing obstruction; however, CT  urogram and/or ureteroscopy could be considered for additional  evaluation.  5. Small and technically indeterminate 4 mm left upper lobe pulmonary  nodule. Given the absence of a known primary tumor, this nodule is  below the size threshold origin follow-up most the patient is  determined to be high risk in which case follow-up chest CT in  12  months could be considered.    I have personally reviewed the examination and initial interpretation  and I agree with the findings.    NILDA ZHU MD            Impression and Plan:   Impression:   Christopher Cuevas is a 89 year old male with no significant urologic hx who was found to have 8 mm distal ureteral stone on right side as well as non-obstructing stone in renal pelvis, and left distal hydroureter.  ANISHA believed to be prerenal and Cr is trending down, no right sided hydronephrosis   Left distal hydroureter with no clear evidence of obstruction   No fever, chills or leukocytosis   UA from 1/7 is negative         Plan:     - recommend medical expulsive therapy with flomax     - obtain KUB to see if stone seen on xray, mostly for follow up    - will need emergent stenting of right and left side if patient develops fever, chills or sign of urinary infection             This patient's exam findings, labs, and imaging discussed with urology staff surgeon , who developed the treatment plan.    Bebe Silva MD  Urology Resident PGY3

## 2018-01-13 NOTE — PLAN OF CARE
"Problem: Patient Care Overview  Goal: Plan of Care/Patient Progress Review  Outcome: No Change  /70 (BP Location: Right arm)  Pulse 84  Temp 98.2  F (36.8  C) (Oral)  Resp 16  Ht 1.727 m (5' 8\")  Wt 72.9 kg (160 lb 11.5 oz)  SpO2 96%  BMI 24.44 kg/m2    Pt is disoriented x4. VSS on RA. Regular diet; refused dinner. Up with assist x1 + bedside attendant. BGs 72, 69. After apple juice, BG 79. Will continue to monitor closely and encourage fluids. Lotions/creams in MAR applied to rash on R arm and leg. Pt continues to be extremely restless and hyperactive. L PIV saline locked. Up to bathroom x3; intermittently incontinent. Will continue to monitor closely.       "

## 2018-01-13 NOTE — PROGRESS NOTES
Diabetes Consult Daily  Progress Note          Assessment/Plan:                           Christopher Cuevas is a 89 year old male with history of dementia, hypertension, CAD s/p CABG,  GERD, and a recent fall who was admitted to the NeuroICU (1/7/2018) from OSH with concern for ICH. CT images suggestive of calcification vs ICH vs related to hyperglycemia.    DX with RUE/RLE choreoathetosis thought due to hyperglycemia    Type 2 diabetes: previously on no meds per family, A1c 6 mo ago was ~7, but recent worsening to A1C > 14% ( 1/7/2018)  Hypoglycemia: likely due to slightly higher than required long acting insulin. lantus was decreased to 10u, but now trending back up to >200  Hypercalcemia: initially mildly elevated, max 10.9.  PTH drawn but pending. Ca has since normalized. Likely related to dehydration and HCTZ use prior to admission. PTH may return mildly elevated given he is vitamin D deficient.   Adrenal nodule - unable to evaluate imaging risk as IV contrast could not be used. DDX includes adrenal adenoma (secretory vs non secretory), pheochromocytoma, adrenocortical carcinoma. It is unclear if this is a known diagnosis for the patient. This will likely require further evaluation (pending patient and family's goals of care) with hormonal evaluation and follow up CT in 3-6 months. Would recommend outpatient workup.   ANISHA on CKD: Cr on admission 3.30, Cr trending downward to 1.44 today      Plan  - recommend lantus 15u daily, ordered 5 additional units for today. 15u to start 1/14/18 in AM.   - continue 1 unit per 15  grams of CHO with meals and snacks   - continue medium slide, Novolog 1 unit per 40 > 140 before meals and > 200 HS  -monitor glucose before meals, HS and 0200  - having good glucose control is important, but due to age and underlying dementia, would recommend target blood sugars 125 to < 200 ( instead of ) to prevent hypoglycemia  -follow up PTH  -recommend vitamin D  "supplementation, 2,000iu daily should be sufficient  -defer workup of adrenal nodule to outpatient setting. Will need biochemical testing and repeat CT in 3-6 months.      Will continue to follow                                  Interval History:   The last 24 hours progress and nursing notes reviewed.    Had hypoglycemia to 66 overnight, received multiple juices.   Per family patient is back to baseline mental status  He still is having occasional choreoathetotic movements but much improved from yesterday  He is eating more today per family  Blood sugars have trended up to >200 since this AM.       Beyond DM management, he had hypercalcemia on admission which has now improved. PTH pending. CT CAP was ordered to evaluate for malignancy as a cause of the hypercalcemia and it shows an adrenal nodule.        Recent Labs  Lab 01/13/18  1244 01/13/18  0945 01/13/18  0815 01/13/18  0730 01/13/18  0659 01/13/18  0559 01/13/18  0405  01/12/18  0705  01/11/18  0806  01/10/18  1049  01/09/18  0643  01/08/18 2022   GLC  --   --   --  149*  --   --   --   --  102*  --  218*  --  126*  --  147*  --  222*   * 190* 137*  --  128* 78 90  < >  --   < >  --   < >  --   < >  --   < >  --    < > = values in this interval not displayed.            Review of Systems:   See interval hx          Medications:       Active Diet Order      Regular Diet Adult     Physical Exam:  Gen:  NAD   HEENT: mucous membranes are moist  Resp: Unlabored  Neuro:oriented person, underlying dementia  /63 (BP Location: Left arm)  Pulse 105  Temp 97.6  F (36.4  C) (Oral)  Resp 20  Ht 1.727 m (5' 8\")  Wt 74.3 kg (163 lb 14.4 oz)  SpO2 99%  BMI 24.92 kg/m2           Data:     Lab Results   Component Value Date    A1C 14.1 01/08/2018            Most Recent 3 CBC's:  Recent Labs   Lab Test  01/13/18   0730  01/12/18   0705  01/11/18   1347   WBC  3.7*  4.0  3.4*   HGB  10.8*  10.8*  10.6*   MCV  93  93  93   PLT  84*  82*  91*     Most Recent 3 " BMP's:  Recent Labs   Lab Test  01/13/18   0730  01/12/18   0705  01/11/18   0806   NA  146*  143  141   POTASSIUM  3.7  3.6  3.9   CHLORIDE  118*  116*  115*   CO2  17*  20  18*   BUN  17  19  27   CR  1.44*  1.53*  1.65*   ANIONGAP  11  8  8   LIZ  8.6  8.3*  8.3*   GLC  149*  102*  218*     Most Recent 2 LFT's:  Recent Labs   Lab Test  01/09/18   0643  01/07/18 2000   AST  22  20   ALT  18  21   ALKPHOS  56  74   BILITOTAL  0.6  0.7       Luisa Jones MD  Fellow in Diabetes, Endocrinology, and Metabolism  PGY4  Pager [phone]    Patient was discussed with attending Dr. Bailey    ATTENDING NOTE   I have seen and examined the patient, reviewed and edited the fellow's note, and agree with the plan of care.    Vanessa Bailey MD    Division of Diabetes and Endocrinology  Department of Medicine  [phone]

## 2018-01-13 NOTE — PLAN OF CARE
Problem: Patient Care Overview  Goal: Plan of Care/Patient Progress Review  BP in 130's-140's, given PRN hydralazine. Tachycardic at times. Triggered sepsis protocol, LA results pending. BG (90, 271), sliding scale and carb coverage given. Denies pain and nausea. On a regular diet, carb and calorie count diet. Has high sodium, encourage oral fluids. PIV in left arm, saline locked. Had stool early AM, and a couple urine occurrences throughout day. Wounds on ankle and potentially on side of leg. MD aware and watching close. Up with assistance of one, walker and gait belt. Continue with plan of care. Notify MD of any changes.

## 2018-01-13 NOTE — PLAN OF CARE
Problem: Patient Care Overview  Goal: Plan of Care/Patient Progress Review  Discharge Planner SLP   Patient plan for discharge: Pt did not state.   Current status: Clinical swallowing evaluation completed per MD orders. Pt demonstrates mild oropharyngeal dysphagia as characterized by prolonged mastication and minimal residue in his mouth after the cracker. Pt independently takes a sip of water to clear residue. Pt demonstrated no overt clinical s/sx of aspiration/penetration during evaluation. Pt is highly distractable and has difficulty attending to task which increases his risk for aspiration/penetration. Recommend pt continue with regular consistency diet with thin liquids. Sit pt upright for all po intake. Encourage pt to select softer items. Pt would benefit from assist with eating to encourage small bites/sips and slow rate. Pt will remain at higher risk for aspiration due to confusion. No further SLP services indicated at this time. Please reconsult as needed.   Barriers to return to prior living situation: None per SLP  Recommendations for discharge: Defer to PT/OT  Rationale for recommendations: No further SLP services indicated at this time.        Entered by: Albania Parada 01/13/2018 8:57 AM

## 2018-01-13 NOTE — PROGRESS NOTES
"CLINICAL NUTRITION SERVICES - REASSESSMENT NOTE (re-consult for \"concern for malnutrition r/t hospitalization\")     Nutrition Prescription    Malnutrition Status:    Severe malnutrition in the context of chronic illness    Recommendations already ordered by Registered Dietitian (RD):  MVI with minerals - to ensure adequate micronutrients received  Ensure Plus with meals - please offer if patient declines to order a meal  Calorie counts (1/14-1/16)    Future/Additional Recommendations:  Please provide assistance with eating when able/appropriate.  If patient declines meals, please provide/offer Ensure Plus with a straw.  Consider adding Ensure Plus to coffee.     EVALUATION OF THE PROGRESS TOWARD GOALS   Diet: Regular + Ensure Plus with meals    Intake: Tolerating with fair/good appetite (%)per nursing on 1/11/18, \"just needs time to eat\".  Noted does refuse meals at times (0-50% on 1/12 and 1/13).  Per SLP - \"would benefit from assist with eating to encourage small bites/sips and slow rate.     During visit patient declined to order a lunch meal, noting he was only craving coffee.  When writer handed him an Ensure Plus, he drank and said that it was good.  He did appear to be too weak/uncoordinated to drink without assistance and a straw.    Patient's daughter notes that he often times chooses donuts, sweet rolls, candy bars over meals.  He does love fruit and is agreeable to getting this for lunch today despite not wanting a meal.     Meal preferences: oatmeal, grapes, peaches, mandarin oranges, applesauce, spaghetti, meatloaf, green beans, loves coffee with creamer    NEW FINDINGS   Agitated and confused at times, has a sitter.  Scaly plaques on R upper/lower extremities and abdomen.  Na 146, BUN 1.44 (CKD), BG  so far today  1 unit Novolog per 15 grams CHO at meals    MALNUTRITION  % Intake: </=75% for >/= 1 month (severe)  % Weight Loss: None noted  Subcutaneous Fat Loss: None observed  Muscle Loss: " Upper leg (quadricep, hamstring):  moderate, Patellar region: moderate and Posterior calf:  moderate  Fluid Accumulation/Edema: None noted  Malnutrition Diagnosis: Severe malnutrition in the context of chronic illness    Previous Goals   Patient to consume % of nutritionally adequate meal trays TID, or the equivalent with supplements/snacks.  Evaluation: Not met    Previous Nutrition Diagnosis  Predicted inadequate nutrient intake calorie/protein  Evaluation: Declining    CURRENT NUTRITION DIAGNOSIS  Inadequate oral intake related to poor appetite, declining meals as evidenced by PO intakes 0-50% over the last 2 days.     INTERVENTIONS  Implementation  Briefly discussed menu, likes/dislikes  Medical food supplement therapy - Ensure Plus with meals, varied flavors  Multivitamin/mineral supplement therapy  Calorie counts to ensure PO adequacy    Goals  Pt to consume at least 1095 kcal, 45 grams protein daily (~60% estimated needs)    Monitoring/Evaluation  Progress toward goals will be monitored and evaluated per protocol.    Melody Ruiz MS, RD, LD  Pager [phone]

## 2018-01-13 NOTE — PLAN OF CARE
Problem: Patient Care Overview  Goal: Plan of Care/Patient Progress Review  PT 7A    Discharge Planner PT   Patient plan for discharge: not stated  Current status: pt ambulates 75ft x 2 with FWW and mod assist x 1 with close wheelchair follow. Pt with two severe LOB during session, one in hallway and one in bathroom, max assist needed to prevent fall. Pt unstable and with involuntary R UE and RLE movements limiting his safety.   Barriers to return to prior living situation: poor safety, heavy assist for mobility, balance, cognition  Recommendations for discharge: TCU  Rationale for recommendations: pt with below baseline mobility and safety, will need continued rehab to return to PLOF.       Entered by: Alejandra Avelar 01/13/2018 10:29 AM

## 2018-01-13 NOTE — PROGRESS NOTES
01/13/18 0832   General Information   Onset Date 01/08/18   Start of Care Date 01/13/18   Referring Physician Dr. Cinthia Mckeon   Patient Profile Review/OT: Additional Occupational Profile Info See Profile for full history and prior level of function   Patient/Family Goals Statement Pt did not state goals.    Swallowing Evaluation Bedside swallow evaluation   Behaviorial Observations Alert;Confused;Distractible;Impulsive   Mode of current nutrition Oral diet   Type of oral diet Regular;Thin liquid   Comments Orders received for clinical swallowing evaluation. 89 year old male with history of dementia, hypertension, CAD s/p CABG,  GERD, and a recent fall who was admitted to the NICU (1/7/2018) from OSH with concern for ICH. CT images suggestive of calcification vs ICH vs related to hyperglycemia. Pt seen by SLP services on 1/8/18 and cleared for regular consistency diet. SLP reconsulted as concern for aspiration present.    Clinical Swallow Evaluation   Oral Musculature generally intact   Dentition present and adequate  (few missing)   Mucosal Quality dry   Mandibular Strength and Mobility intact   Oral Labial Strength and Mobility WFL   Lingual Strength and Mobility WFL   Velar Elevation intact   Buccal Strength and Mobility intact   Laryngeal Function Cough;Throat clear;Swallow;Voicing initiated   Clinical Swallow Eval: Thin Liquid Texture Trial   Mode of Presentation, Thin Liquids cup;self-fed  (with assist)   Volume of Liquid or Food Presented 6 oz water   Oral Phase of Swallow WFL   Pharyngeal Phase of Swallow intact   Diagnostic Statement No overt clinical s/sx of aspiration/penetration noted on thin liquid trials.    Clinical Swallow Eval: Puree Solid Texture Trial   Mode of Presentation, Puree spoon;fed by clinician   Volume of Puree Presented 4 oz applesauce   Oral Phase, Puree WFL   Pharyngeal Phase, Puree intact   Diagnostic Statement No overt clinical s/sx of aspiration/penetration noted on puree  consistency trials.    Clinical Swallow Eval: Solid Food Texture Trial   Mode of Presentation, Solid self-fed   Volume of Solid Food Presented saltine crackers   Oral Phase, Solid other (see comments)  (Prolonged mastication but adequate)   Oral Residue, Solid other (see comments)  (Minimal residue which pt clears with sip of liquid I'ly)   Pharyngeal Phase, Solid intact   Diagnostic Statement No overt clinical s/sx of aspiration/penetration. Pt takes sips of liquid to help wash down cracker due to very dry mouth.    Swallow Compensations   Swallow Compensations Alternate viscosity of consistencies;Reduce amounts;Pacing   Results No difficulties noted   Esophageal Phase of Swallow   Patient reports or presents with symptoms of esophageal dysphagia No   Swallow Eval: Clinical Impressions   Skilled Criteria for Therapy Intervention Current level of function same as previous level of function   Functional Assessment Scale (FAS) 5   Treatment Diagnosis Mild oropharyngeal dysphagia   Diet texture recommendations Regular diet;Thin liquids   Recommended Feeding/Eating Techniques alternate between small bites and sips of food/liquid;hard swallow w/ each bite or sip;small sips/bites;maintain upright posture during/after eating for 30 mins  (self selection of softer foods)   Predicted Duration of Therapy Intervention (days/wks) Evaluation only   Anticipated Discharge Disposition other (see comments)  (Defer to PT/OT)   Risks and Benefits of Treatment have been explained. Yes   Patient, family and/or staff in agreement with Plan of Care Yes   Clinical Impression Comments Clinical swallowing evaluation completed per MD orders. Pt demonstrates mild oropharyngeal dysphagia as characterized by prolonged mastication and minimal residue in his mouth after the cracker. Pt independently takes a sip of water to clear residue. Pt demonstrated no overt clinical s/sx of aspiration/penetration during evaluation. Pt is highly distractable  and has difficulty attending to task which increases his risk for aspiration/penetration. Recommend pt continue with regular consistency diet with thin liquids. Sit pt upright for all po intake. Encourage pt to select softer items. Pt would benefit from assist with eating to encourage small bites/sips and slow rate. Pt will remain at higher risk for aspiration due to confusion. No further SLP services indicated at this time. Please reconsult as needed.    Total Evaluation Time   Total Evaluation Time (Minutes) 20

## 2018-01-13 NOTE — PROGRESS NOTES
General acute hospital, [place]    Internal Medicine Progress Note - Gold Service      Assessment & Plan   Christopher Cuevas is a 89 year old male admitted on 1/8/2018. He has PMH of Dementia, HTN, CAD s/p CABG, GERD, BPH and recent witnessed fall who was admitted to the NICU (1/7/17) from OSH with concern for ICH on CT. Patient admitted to NICU for further evaluation. CT images suggestive of calcification vs ICH vs related to hyperglycemia. Patient transferred out of the ICU and transferred to medicine for further care.      Plans for today;  - ongoing chorea and agitation - starting on seroquel   -delirium is getting better  -switched to insulin basal bolus regimen - was hypoglycemia at night   -urology consulted and discussed with endo about adrenal nodule   -still requiring sitter       # RUE choreoathetosis, witnessed fall, Concern for ICH in patient with Dementia:  #Altered mental status   Pt. Presented to OSH after suffering a mechanical fall from standing (1/7/17) with reported head trauma and CT with concern for ICH. Family noted RU and possible RLE uncontrolled movement PTA. On admission to NICU CT with hyperdensity of the left putamen, diffuse cerebral volume loss and cerebral white matter changes consistent with chronic small vessel disease. Repeat CT with re demonstration of diffuse hyperdensity of the left putamen- likely remineralization, though f/u with MRI recommended.   Overall the findings related to non ketotic hyperglycemia which can cause choreoathetoid movements   MRI obtained that showed asymmetric hyperdensity in the basal ganglia on CT (which is likely related to prolonged hyperglycemia)    - continue with hyperglycemia management as below  -still delirious but improving   -fall precautions; delirium precautions   -trying to maintain sleep-wake cycle -continued with trazodone 50 mg -plan to go up if not working today  -PT/OT involved   -metabolic workup so far negative  -resume  aspirin   -continue with bedside sitter and will need TCU  -will need neurology follow up as an outpatient to see how much improvement he will have just with blood glucose control  -ongoing chorea which will take time so holding off on starting medications for that   -discussed with neurology again today and will take atleast 1 month to see real improvement with symptoms despite glucose control so will need follow up to see how he does with chorea       # Hyperglycemia, DMII     HHS:    Resolved; suspect this is an ongoing and chronic issue  Hba1c of 14.1  Resolving and transitioned to basal bolus regimen; glargine 10 units which is reduced from before and discussed with endo about meal time insulin   Endocrinology will follow         #Eczema  Per nursing, patient with erythematous rash on bilateral UE with slight extension to the back. - Closely monitor  - started on triamcinolone 0.1% BID and emollient as per derm recs- also instructed nursing to put saran wrap with triamcinolone   -dermatology was consulted- eczematous type rash   -will need dermatology follow up as an outpatient  -started on cetrizine 10 mg PO BID      # Hypercalcemia: Ca on admission 10.9 --> 10.5--> 9.8.   -resolved ; secondary to dehydration and use of thiazides   - Continue IVF  -still high level of calcium corrected to 10.3, continue to monitor and now persistently to 8   --ionized calcium and calcium level was within normal range after hydration; pending PTH level  -vitamin D2 level is low - started on cholecalciferol 1000 units daily     Concern for hypercalcemia which can be attributed by HCTZ, along with delirium and chorea; daughter did mention that he had weight loss in 6 months   To r/o underlying malignancy sent for CT chest, abdomen and pelvis -no real evidence of malignancy   Does have multiple non obstructing stones-urology consulted;  Also adrenal nodule which is an incidental findings-discussed with endocrinology about that  -will follow      # Pancytopenia : Platelets 139 --> 111, also noted hemoglobin and WBC is dropping  -still could be dilutional with IV fluids  -work up initiated with vitamin b12, iron panel and ferritin, LDH, haptoglobin, d-dimer, peripheral smear and retics -which is unremarkable and consistent with anemia of chronic disease   -so far negative and no concern for hemolysis  -retics is inappropriate for the level of anemia which could be related to CKD        # HTN, CAD s/p CABG: No hx on file.    PTA ASA, lisinopril, HCTZ.   -will continue to hold HCTZ and lisinopril due to dehydration;   -started on amlodipine 5mg and eventually can resume lisinopril if creatinine remains stable      # ANISHA on CKD: Cr on admission 3.30 BUN elevated to 70 BL appears  ~ 2.0. Cr down to 2.10 --> 1.6 with IVF. Suspect prerenal in setting of hypovolemia.   - also has known h/o CKD with uncontrolled diabetes  -some improvement with hydration but majority is CKD  -in euvolemic state- will d/c IV fluids   -creatinine to 1.53 which is near to baseline     # BPH: PTA Flomax.        Diet: Regular Diet Adult  Snacks/Supplements Adult: Ensure Plus (Adult); With Meals  Fluids: IV fluids   DVT Prophylaxis: chemical prophylaxis holding off due to low platelets   Code Status: DNR/DNI    Disposition Plan   Expected discharge: 4 - 7 days, recommended to transitional care unit once off sitter and mental status is better      Entered: Cinthia Mckeon 01/13/2018, 12:17 PM   Information in the above section will display in the discharge planner report.      The patient's care was discussed with the Bedside Nurse and daughter     alpesh Caldwellta  Internal Medicine Staff Hospitalist Service  [facility]  Pager: [phone]   Please see sticky note for cross cover information    Interval History   Still restless and having chorea on right upper and lower extremity ; continues to be restless due to this   Last 24 hr care team notes reviewed.    ROS:  4 point ROS including Respiratory, CV, GI and , other than that noted in the HPI, is negative.     Data reviewed today: I reviewed all medications, new labs and imaging results over the last 24 hours. I personally reviewed CT head with subtle asymmetric diffuse hyperdensity   Physical Exam   Vital Signs: Temp: 98.2  F (36.8  C) Temp src: Oral BP: 144/78 Pulse: 87 Heart Rate: 98 Resp: 20 SpO2: 97 % O2 Device: None (Room air)    Weight: 163 lbs 14.4 oz  General Appearance: Pt confused but awake and alert   Respiratory: b/l equal breath sounds with no added sounds   Cardiovascular: s1s2 with no murmur   GI: soft, non tender, bowel sounds noted   Skin: presence of maculopapular rash in back and upper extremity ; better in terms of redness in right upper extremity  Neuro: AAOx1, b/l UE and LE-4/5; non focal           Data   Data     Recent Labs  Lab 01/13/18  0730 01/12/18  0705 01/11/18  1347 01/11/18  0806  01/09/18  0643  01/07/18 2000   WBC 3.7* 4.0 3.4* 3.6*  --  5.9  < > 8.1   HGB 10.8* 10.8* 10.6* 10.5*  --  11.5*  < > 13.5   MCV 93 93 93 92  --  93  < > 88   PLT 84* 82* 91* 87*  --  102*  < > 139*   * 143  --  141  < > 137  < > 132*   POTASSIUM 3.7 3.6  --  3.9  < > 3.5  < > 4.1   CHLORIDE 118* 116*  --  115*  < > 107  < > 96   CO2 17* 20  --  18*  < > 20  < > 22   BUN 17 19  --  27  < > 50*  < > 70*   CR 1.44* 1.53*  --  1.65*  < > 2.10*  < > 3.30*   ANIONGAP 11 8  --  8  < > 11  < > 14   LIZ 8.6 8.3*  --  8.3*  < > 9.5  < > 10.9*   * 102*  --  218*  < > 147*  < > 472*   ALBUMIN  --   --   --   --   --  3.0*  --  3.9   PROTTOTAL  --   --   --   --   --  5.6*  --  7.4   BILITOTAL  --   --   --   --   --  0.6  --  0.7   ALKPHOS  --   --   --   --   --  56  --  74   ALT  --   --   --   --   --  18  --  21   AST  --   --   --   --   --  22  --  20   < > = values in this interval not displayed.

## 2018-01-14 ENCOUNTER — APPOINTMENT (OUTPATIENT)
Dept: GENERAL RADIOLOGY | Facility: CLINIC | Age: 83
DRG: 637 | End: 2018-01-14
Attending: INTERNAL MEDICINE
Payer: COMMERCIAL

## 2018-01-14 LAB
ANION GAP SERPL CALCULATED.3IONS-SCNC: 8 MMOL/L (ref 3–14)
BUN SERPL-MCNC: 17 MG/DL (ref 7–30)
CALCIUM SERPL-MCNC: 8.5 MG/DL (ref 8.5–10.1)
CHLORIDE SERPL-SCNC: 118 MMOL/L (ref 94–109)
CO2 SERPL-SCNC: 21 MMOL/L (ref 20–32)
CREAT SERPL-MCNC: 1.61 MG/DL (ref 0.66–1.25)
ERYTHROCYTE [DISTWIDTH] IN BLOOD BY AUTOMATED COUNT: 13.8 % (ref 10–15)
GFR SERPL CREATININE-BSD FRML MDRD: 41 ML/MIN/1.7M2
GLUCOSE BLDC GLUCOMTR-MCNC: 146 MG/DL (ref 70–99)
GLUCOSE BLDC GLUCOMTR-MCNC: 166 MG/DL (ref 70–99)
GLUCOSE BLDC GLUCOMTR-MCNC: 259 MG/DL (ref 70–99)
GLUCOSE BLDC GLUCOMTR-MCNC: 314 MG/DL (ref 70–99)
GLUCOSE BLDC GLUCOMTR-MCNC: 392 MG/DL (ref 70–99)
GLUCOSE SERPL-MCNC: 155 MG/DL (ref 70–99)
HCT VFR BLD AUTO: 31.3 % (ref 40–53)
HGB BLD-MCNC: 10.4 G/DL (ref 13.3–17.7)
LACTATE BLD-SCNC: 1.7 MMOL/L (ref 0.7–2)
MCH RBC QN AUTO: 31.3 PG (ref 26.5–33)
MCHC RBC AUTO-ENTMCNC: 33.2 G/DL (ref 31.5–36.5)
MCV RBC AUTO: 94 FL (ref 78–100)
PLATELET # BLD AUTO: 91 10E9/L (ref 150–450)
POTASSIUM SERPL-SCNC: 4.1 MMOL/L (ref 3.4–5.3)
PTH RELATED PROT SERPL-SCNC: 6.4 PMOL/L (ref 0–2.3)
RBC # BLD AUTO: 3.32 10E12/L (ref 4.4–5.9)
SODIUM SERPL-SCNC: 146 MMOL/L (ref 133–144)
WBC # BLD AUTO: 3.3 10E9/L (ref 4–11)

## 2018-01-14 PROCEDURE — 27210995 ZZH RX 272: Performed by: INTERNAL MEDICINE

## 2018-01-14 PROCEDURE — 00000146 ZZHCL STATISTIC GLUCOSE BY METER IP

## 2018-01-14 PROCEDURE — 36415 COLL VENOUS BLD VENIPUNCTURE: CPT | Performed by: INTERNAL MEDICINE

## 2018-01-14 PROCEDURE — 80048 BASIC METABOLIC PNL TOTAL CA: CPT | Performed by: INTERNAL MEDICINE

## 2018-01-14 PROCEDURE — 25000132 ZZH RX MED GY IP 250 OP 250 PS 637: Performed by: STUDENT IN AN ORGANIZED HEALTH CARE EDUCATION/TRAINING PROGRAM

## 2018-01-14 PROCEDURE — 85027 COMPLETE CBC AUTOMATED: CPT | Performed by: INTERNAL MEDICINE

## 2018-01-14 PROCEDURE — 74019 RADEX ABDOMEN 2 VIEWS: CPT

## 2018-01-14 PROCEDURE — 25000132 ZZH RX MED GY IP 250 OP 250 PS 637: Performed by: INTERNAL MEDICINE

## 2018-01-14 PROCEDURE — 12000025 ZZH R&B TRANSPLANT INTERMEDIATE

## 2018-01-14 PROCEDURE — 25000125 ZZHC RX 250: Performed by: STUDENT IN AN ORGANIZED HEALTH CARE EDUCATION/TRAINING PROGRAM

## 2018-01-14 PROCEDURE — 83605 ASSAY OF LACTIC ACID: CPT | Performed by: INTERNAL MEDICINE

## 2018-01-14 PROCEDURE — 25000132 ZZH RX MED GY IP 250 OP 250 PS 637: Performed by: PHYSICIAN ASSISTANT

## 2018-01-14 PROCEDURE — 99233 SBSQ HOSP IP/OBS HIGH 50: CPT | Performed by: INTERNAL MEDICINE

## 2018-01-14 RX ORDER — SODIUM CHLORIDE 9 MG/ML
INJECTION, SOLUTION INTRAVENOUS CONTINUOUS
Status: DISCONTINUED | OUTPATIENT
Start: 2018-01-14 | End: 2018-01-14

## 2018-01-14 RX ORDER — SODIUM CHLORIDE 450 MG/100ML
INJECTION, SOLUTION INTRAVENOUS CONTINUOUS
Status: DISCONTINUED | OUTPATIENT
Start: 2018-01-14 | End: 2018-01-15

## 2018-01-14 RX ADMIN — ASPIRIN 81 MG CHEWABLE TABLET 81 MG: 81 TABLET CHEWABLE at 08:54

## 2018-01-14 RX ADMIN — SALINE NASAL SPRAY 1 SPRAY: 1.5 SOLUTION NASAL at 09:36

## 2018-01-14 RX ADMIN — TRIAMCINOLONE ACETONIDE: 1 OINTMENT TOPICAL at 20:53

## 2018-01-14 RX ADMIN — Medication 12.5 MG: at 09:26

## 2018-01-14 RX ADMIN — TAMSULOSIN HYDROCHLORIDE 0.4 MG: 0.4 CAPSULE ORAL at 08:54

## 2018-01-14 RX ADMIN — AMLODIPINE BESYLATE 5 MG: 5 TABLET ORAL at 08:54

## 2018-01-14 RX ADMIN — SODIUM CHLORIDE: 4.5 INJECTION, SOLUTION INTRAVENOUS at 17:10

## 2018-01-14 RX ADMIN — VITAMIN D, TAB 1000IU (100/BT) 2000 UNITS: 25 TAB at 08:54

## 2018-01-14 RX ADMIN — Medication: at 13:22

## 2018-01-14 RX ADMIN — Medication: at 20:52

## 2018-01-14 RX ADMIN — PRAVASTATIN SODIUM 20 MG: 20 TABLET ORAL at 20:50

## 2018-01-14 RX ADMIN — CETIRIZINE HYDROCHLORIDE 10 MG: 10 TABLET, FILM COATED ORAL at 20:50

## 2018-01-14 RX ADMIN — Medication: at 09:27

## 2018-01-14 RX ADMIN — CETIRIZINE HYDROCHLORIDE 10 MG: 10 TABLET, FILM COATED ORAL at 08:54

## 2018-01-14 RX ADMIN — MUPIROCIN: 2 CREAM TOPICAL at 20:52

## 2018-01-14 RX ADMIN — TRIAMCINOLONE ACETONIDE: 1 OINTMENT TOPICAL at 09:26

## 2018-01-14 RX ADMIN — MUPIROCIN: 2 CREAM TOPICAL at 09:27

## 2018-01-14 RX ADMIN — MULTIPLE VITAMINS W/ MINERALS TAB 1 TABLET: TAB at 08:54

## 2018-01-14 RX ADMIN — TRAZODONE HYDROCHLORIDE 50 MG: 50 TABLET ORAL at 21:00

## 2018-01-14 NOTE — DOWNTIME EVENT NOTE
The EMR was down for 2 hours on 1/14/2018.    Marialuisa Newberry was responsible for completing the paper charting during this time period.     The following information was re-entered into the system by Marialuisa Newberry: Flowsheet data    The following information will remain in the paper chart: no medications were administered during down time.    Marialuisa Newberry  1/14/2018

## 2018-01-14 NOTE — PLAN OF CARE
Problem: Patient Care Overview  Goal: Plan of Care/Patient Progress Review  Outcome: Improving  VSS, alert and oriented x3. With sitter for intermittent confusion and restlessness.  B prior to dinner  Denies pain and nausea.  Regular diet, poor nutritional intake.  On angel counts.  L periph saline locked.  Urine output 300, no BM  Rash on R Leg covered with ointment, wrapped in saran wrap.  Pt is on bedrest.  Continue to monitor.

## 2018-01-14 NOTE — PROVIDER NOTIFICATION
Provider notified of elevated blood glucose level; 392. Discovered that patient had consumed 3 chocolate ensure shakes that were not included with carb coverage. Hyperglycemia covered per current correction scale. Provider also notified that patient triggered the sepsis protocol for a HR of 110; Lactic 1.7. No new orders at this time; will continue to monitor.

## 2018-01-14 NOTE — PROGRESS NOTES
Urology Brief note       KUB image reviewed, stone visualized on KUB. Patient remains vitally stable and afebrile. Urine Cx negative and WBC within  nromal limits.  Urology will sign off and follow up in clinic in 2 weeks with KUB (message sent to ).      Bebe Silva MD  Urology PGY3

## 2018-01-14 NOTE — PLAN OF CARE
"Problem: Patient Care Overview  Goal: Plan of Care/Patient Progress Review  Outcome: No Change  /69 (BP Location: Right arm)  Pulse 97  Temp 98.3  F (36.8  C) (Oral)  Resp 18  Ht 1.727 m (5' 8\")  Wt 74.3 kg (163 lb 14.4 oz)  SpO2 99%  BMI 24.92 kg/m2.   B. Pt. had no c/o's pain or nausea. Pt. voided once in brief, no stools this shift.  Left PIV saline locked. Pt. slept much better tonight than previous night. Pt. remains confused & restless when awake; sitter at bedside.  Continue to follow POC & notify MD with changes.        "

## 2018-01-14 NOTE — PROGRESS NOTES
[facility], [place]    Internal Medicine Progress Note - Gold Service      Assessment & Plan   Christopher Cuevas is a 89 year old male admitted on 1/8/2018. He has PMH of Dementia, HTN, CAD s/p CABG, GERD, BPH and recent witnessed fall who was admitted to the NICU (1/7/17) from OSH with concern for ICH on CT. Patient admitted to NICU for further evaluation. CT images suggestive of calcification vs ICH vs related to hyperglycemia. Patient transferred out of the ICU and transferred to medicine for further care.      Plans for today;  - ongoing chorea and agitation - starting on seroquel   -agitation and restless improved on seroquel and trazodone   -delirium is getting better  -switched to insulin basal bolus regimen   -still requiring sitter   -plan to d/c sitter tomorrow if continues to show improvement       # RUE choreoathetosis, witnessed fall, Concern for ICH in patient with Dementia:  #Altered mental status   Pt. Presented to OSH after suffering a mechanical fall from standing (1/7/17) with reported head trauma and CT with concern for ICH. Family noted RU and possible RLE uncontrolled movement PTA. On admission to NICU CT with hyperdensity of the left putamen, diffuse cerebral volume loss and cerebral white matter changes consistent with chronic small vessel disease. Repeat CT with re demonstration of diffuse hyperdensity of the left putamen- likely remineralization, though f/u with MRI recommended.   Overall the findings related to non ketotic hyperglycemia which can cause choreoathetoid movements   MRI obtained that showed asymmetric hyperdensity in the basal ganglia on CT (which is likely related to prolonged hyperglycemia)  - continue with hyperglycemia management as below  -still delirious but improving   -fall precautions; delirium precautions   -trying to maintain sleep-wake cycle -continued with trazodone 50 mg -plan to go up if not working today  -PT/OT involved   -metabolic  workup so far negative  -resume aspirin   -continue with bedside sitter and will need TCU  -will need neurology follow up as an outpatient to see how much improvement he will have just with blood glucose control  -ongoing chorea which will take time so holding off on starting medications for that   -discussed with neurology again  and will take atleast 1 month to see real improvement with symptoms despite glucose control so will need follow up to see how he does with chorea       # Hyperglycemia, DMII     HHS:    Resolved; suspect this is an ongoing and chronic issue  Hba1c of 14.1  Resolving and transitioned to basal bolus regimen; glargine 10 units which is reduced from before and discussed with endo about meal time insulin   Endocrinology will follow         #Eczema  Per nursing, patient with erythematous rash on bilateral UE with slight extension to the back. - Closely monitor  - started on triamcinolone 0.1% BID and emollient as per derm recs- also instructed nursing to put saran wrap with triamcinolone   -dermatology was consulted- eczematous type rash   -will need dermatology follow up as an outpatient  -started on cetrizine 10 mg PO BID      # Hypercalcemia: Ca on admission 10.9 --> 10.5--> 9.8.   -resolved ; secondary to dehydration and use of thiazides   - Continue IVF  -still high level of calcium corrected to 10.3, continue to monitor and now persistently to 8   --ionized calcium and calcium level was within normal range after hydration; pending PTH level  -vitamin D2 level is low - started on cholecalciferol 1000 units daily     Concern for hypercalcemia which can be attributed by HCTZ, along with delirium and chorea; daughter did mention that he had weight loss in 6 months   To r/o underlying malignancy sent for CT chest, abdomen and pelvis -no real evidence of malignancy   Does have multiple non obstructing stones-urology consulted;  Also adrenal nodule which is an incidental findings-discussed with  endocrinology about that -will follow      # Pancytopenia : Platelets 139 --> 111, also noted hemoglobin and WBC is dropping  -still could be dilutional with IV fluids  -work up initiated with vitamin b12, iron panel and ferritin, LDH, haptoglobin, d-dimer, peripheral smear and retics -which is unremarkable and consistent with anemia of chronic disease   -so far negative and no concern for hemolysis  -retics is inappropriate for the level of anemia which could be related to CKD        # HTN, CAD s/p CABG: No hx on file.    PTA ASA, lisinopril, HCTZ.   -will continue to hold HCTZ and lisinopril due to dehydration;   -started on amlodipine 5mg and eventually can resume lisinopril if creatinine remains stable      # ANISHA on CKD: Cr on admission 3.30 BUN elevated to 70 BL appears  ~ 2.0. Cr down to 2.10 --> 1.6 with IVF. Suspect prerenal in setting of hypovolemia.   - also has known h/o CKD with uncontrolled diabetes  -some improvement with hydration but majority is CKD  -in euvolemic state- will d/c IV fluids   -creatinine to 1.6 which is near to baseline     # BPH: PTA Flomax.        Diet: Regular Diet Adult  Calorie Counts  Snacks/Supplements Adult: Glucerna (Adult); With Meals  Fluids: IV fluids   DVT Prophylaxis: chemical prophylaxis holding off due to low platelets   Code Status: DNR/DNI    Disposition Plan   Expected discharge: 4 - 7 days, recommended to transitional care unit once off sitter and mental status is better      Entered: Cinthia Mckeon 01/14/2018, 3:43 PM   Information in the above section will display in the discharge planner report.      The patient's care was discussed with the Bedside Nurse and daughter     alpesh Annkota  Internal Medicine Staff Hospitalist Service  [facility]  Pager: [phone]   Please see sticky note for cross cover information    Interval History    Improved than before ; stable and chorea improved on right side       Last 24 hr care team notes reviewed.    ROS:  4 point ROS including Respiratory, CV, GI and , other than that noted in the HPI, is negative.     Data reviewed today: I reviewed all medications, new labs and imaging results over the last 24 hours. I personally reviewed CT head with subtle asymmetric diffuse hyperdensity   Physical Exam   Vital Signs: Temp: 98  F (36.7  C) Temp src: Axillary BP: 127/68 Pulse: 110 Heart Rate: 93 Resp: 16 SpO2: 98 % O2 Device: None (Room air)    Weight: 163 lbs 14.4 oz  General Appearance: Pt confused but awake and alert   Respiratory: b/l equal breath sounds with no added sounds   Cardiovascular: s1s2 with no murmur   GI: soft, non tender, bowel sounds noted   Skin: presence of maculopapular rash in back and upper extremity ; better in terms of redness in right upper extremity  Neuro: AAOx1, b/l UE and LE-4/5; non focal           Data   Data     Recent Labs  Lab 01/14/18  0714 01/13/18  0730 01/12/18  0705  01/09/18  0643  01/07/18 2000   WBC 3.3* 3.7* 4.0  < > 5.9  < > 8.1   HGB 10.4* 10.8* 10.8*  < > 11.5*  < > 13.5   MCV 94 93 93  < > 93  < > 88   PLT 91* 84* 82*  < > 102*  < > 139*   * 146* 143  < > 137  < > 132*   POTASSIUM 4.1 3.7 3.6  < > 3.5  < > 4.1   CHLORIDE 118* 118* 116*  < > 107  < > 96   CO2 21 17* 20  < > 20  < > 22   BUN 17 17 19  < > 50*  < > 70*   CR 1.61* 1.44* 1.53*  < > 2.10*  < > 3.30*   ANIONGAP 8 11 8  < > 11  < > 14   LIZ 8.5 8.6 8.3*  < > 9.5  < > 10.9*   * 149* 102*  < > 147*  < > 472*   ALBUMIN  --   --   --   --  3.0*  --  3.9   PROTTOTAL  --   --   --   --  5.6*  --  7.4   BILITOTAL  --   --   --   --  0.6  --  0.7   ALKPHOS  --   --   --   --  56  --  74   ALT  --   --   --   --  18  --  21   AST  --   --   --   --  22  --  20   < > = values in this interval not displayed.

## 2018-01-15 ENCOUNTER — APPOINTMENT (OUTPATIENT)
Dept: OCCUPATIONAL THERAPY | Facility: CLINIC | Age: 83
DRG: 637 | End: 2018-01-15
Attending: PSYCHIATRY & NEUROLOGY
Payer: COMMERCIAL

## 2018-01-15 ENCOUNTER — APPOINTMENT (OUTPATIENT)
Dept: PHYSICAL THERAPY | Facility: CLINIC | Age: 83
DRG: 637 | End: 2018-01-15
Attending: PSYCHIATRY & NEUROLOGY
Payer: COMMERCIAL

## 2018-01-15 LAB
ALBUMIN MFR UR ELPH: 22.8 %
ALBUMIN SERPL ELPH-MCNC: 3.4 G/DL (ref 3.7–5.1)
ALPHA1 GLOB MFR UR ELPH: 8 %
ALPHA1 GLOB SERPL ELPH-MCNC: 0.3 G/DL (ref 0.2–0.4)
ALPHA2 GLOB MFR UR ELPH: 9.4 %
ALPHA2 GLOB SERPL ELPH-MCNC: 0.6 G/DL (ref 0.5–0.9)
ANION GAP SERPL CALCULATED.3IONS-SCNC: 7 MMOL/L (ref 3–14)
B-GLOBULIN MFR UR ELPH: 14.5 %
B-GLOBULIN SERPL ELPH-MCNC: 0.6 G/DL (ref 0.6–1)
BUN SERPL-MCNC: 17 MG/DL (ref 7–30)
CALCIUM SERPL-MCNC: 8 MG/DL (ref 8.5–10.1)
CHLORIDE SERPL-SCNC: 114 MMOL/L (ref 94–109)
CO2 SERPL-SCNC: 21 MMOL/L (ref 20–32)
CREAT SERPL-MCNC: 1.46 MG/DL (ref 0.66–1.25)
ERYTHROCYTE [DISTWIDTH] IN BLOOD BY AUTOMATED COUNT: 13.8 % (ref 10–15)
GAMMA GLOB MFR UR ELPH: 45.3 %
GAMMA GLOB SERPL ELPH-MCNC: 0.6 G/DL (ref 0.7–1.6)
GFR SERPL CREATININE-BSD FRML MDRD: 45 ML/MIN/1.7M2
GLUCOSE BLDC GLUCOMTR-MCNC: 172 MG/DL (ref 70–99)
GLUCOSE BLDC GLUCOMTR-MCNC: 196 MG/DL (ref 70–99)
GLUCOSE BLDC GLUCOMTR-MCNC: 231 MG/DL (ref 70–99)
GLUCOSE BLDC GLUCOMTR-MCNC: 244 MG/DL (ref 70–99)
GLUCOSE BLDC GLUCOMTR-MCNC: 283 MG/DL (ref 70–99)
GLUCOSE BLDC GLUCOMTR-MCNC: 288 MG/DL (ref 70–99)
GLUCOSE SERPL-MCNC: 179 MG/DL (ref 70–99)
HCT VFR BLD AUTO: 28.9 % (ref 40–53)
HGB BLD-MCNC: 9.6 G/DL (ref 13.3–17.7)
M PROTEIN MFR UR ELPH: 0 %
M PROTEIN SERPL ELPH-MCNC: 0 G/DL
MCH RBC QN AUTO: 31.5 PG (ref 26.5–33)
MCHC RBC AUTO-ENTMCNC: 33.2 G/DL (ref 31.5–36.5)
MCV RBC AUTO: 95 FL (ref 78–100)
PLATELET # BLD AUTO: 81 10E9/L (ref 150–450)
POTASSIUM SERPL-SCNC: 3.8 MMOL/L (ref 3.4–5.3)
PROT PATTERN SERPL ELPH-IMP: ABNORMAL
PROT PATTERN UR ELPH-IMP: ABNORMAL
RBC # BLD AUTO: 3.05 10E12/L (ref 4.4–5.9)
SODIUM SERPL-SCNC: 142 MMOL/L (ref 133–144)
WBC # BLD AUTO: 2.6 10E9/L (ref 4–11)

## 2018-01-15 PROCEDURE — 80048 BASIC METABOLIC PNL TOTAL CA: CPT | Performed by: INTERNAL MEDICINE

## 2018-01-15 PROCEDURE — 99233 SBSQ HOSP IP/OBS HIGH 50: CPT | Performed by: INTERNAL MEDICINE

## 2018-01-15 PROCEDURE — 25000132 ZZH RX MED GY IP 250 OP 250 PS 637: Performed by: PHYSICIAN ASSISTANT

## 2018-01-15 PROCEDURE — 25000132 ZZH RX MED GY IP 250 OP 250 PS 637: Performed by: STUDENT IN AN ORGANIZED HEALTH CARE EDUCATION/TRAINING PROGRAM

## 2018-01-15 PROCEDURE — 00000146 ZZHCL STATISTIC GLUCOSE BY METER IP

## 2018-01-15 PROCEDURE — 40000193 ZZH STATISTIC PT WARD VISIT: Performed by: PHYSICAL THERAPIST

## 2018-01-15 PROCEDURE — 36415 COLL VENOUS BLD VENIPUNCTURE: CPT | Performed by: INTERNAL MEDICINE

## 2018-01-15 PROCEDURE — 12000025 ZZH R&B TRANSPLANT INTERMEDIATE

## 2018-01-15 PROCEDURE — 97116 GAIT TRAINING THERAPY: CPT | Mod: GP | Performed by: PHYSICAL THERAPIST

## 2018-01-15 PROCEDURE — 97535 SELF CARE MNGMENT TRAINING: CPT | Mod: GO | Performed by: OCCUPATIONAL THERAPIST

## 2018-01-15 PROCEDURE — 85027 COMPLETE CBC AUTOMATED: CPT | Performed by: INTERNAL MEDICINE

## 2018-01-15 PROCEDURE — 40000133 ZZH STATISTIC OT WARD VISIT: Performed by: OCCUPATIONAL THERAPIST

## 2018-01-15 PROCEDURE — 25000132 ZZH RX MED GY IP 250 OP 250 PS 637: Performed by: INTERNAL MEDICINE

## 2018-01-15 PROCEDURE — 27210995 ZZH RX 272: Performed by: INTERNAL MEDICINE

## 2018-01-15 PROCEDURE — 97530 THERAPEUTIC ACTIVITIES: CPT | Mod: GP | Performed by: PHYSICAL THERAPIST

## 2018-01-15 RX ORDER — CALCIUM CARBONATE 500 MG/1
500 TABLET, CHEWABLE ORAL DAILY PRN
Status: DISCONTINUED | OUTPATIENT
Start: 2018-01-15 | End: 2018-01-18 | Stop reason: HOSPADM

## 2018-01-15 RX ORDER — FLUOCINONIDE 0.5 MG/G
OINTMENT TOPICAL 2 TIMES DAILY
Status: DISCONTINUED | OUTPATIENT
Start: 2018-01-15 | End: 2018-01-18 | Stop reason: HOSPADM

## 2018-01-15 RX ADMIN — TRAZODONE HYDROCHLORIDE 50 MG: 50 TABLET ORAL at 21:56

## 2018-01-15 RX ADMIN — VITAMIN D, TAB 1000IU (100/BT) 2000 UNITS: 25 TAB at 08:41

## 2018-01-15 RX ADMIN — AMLODIPINE BESYLATE 5 MG: 5 TABLET ORAL at 08:41

## 2018-01-15 RX ADMIN — CALCIUM CARBONATE (ANTACID) CHEW TAB 500 MG 500 MG: 500 CHEW TAB at 17:07

## 2018-01-15 RX ADMIN — Medication 12.5 MG: at 08:41

## 2018-01-15 RX ADMIN — Medication: at 09:29

## 2018-01-15 RX ADMIN — PRAVASTATIN SODIUM 20 MG: 20 TABLET ORAL at 19:40

## 2018-01-15 RX ADMIN — ASPIRIN 81 MG CHEWABLE TABLET 81 MG: 81 TABLET CHEWABLE at 08:41

## 2018-01-15 RX ADMIN — CETIRIZINE HYDROCHLORIDE 10 MG: 10 TABLET, FILM COATED ORAL at 19:40

## 2018-01-15 RX ADMIN — SODIUM CHLORIDE: 4.5 INJECTION, SOLUTION INTRAVENOUS at 05:41

## 2018-01-15 RX ADMIN — Medication: at 19:15

## 2018-01-15 RX ADMIN — TRIAMCINOLONE ACETONIDE: 1 OINTMENT TOPICAL at 09:30

## 2018-01-15 RX ADMIN — FLUOCINONIDE: 0.5 OINTMENT TOPICAL at 19:15

## 2018-01-15 RX ADMIN — IMIQUIMOD 0.25 G: 12.5 CREAM TOPICAL at 08:42

## 2018-01-15 RX ADMIN — MULTIPLE VITAMINS W/ MINERALS TAB 1 TABLET: TAB at 08:41

## 2018-01-15 RX ADMIN — TAMSULOSIN HYDROCHLORIDE 0.4 MG: 0.4 CAPSULE ORAL at 08:41

## 2018-01-15 RX ADMIN — MUPIROCIN: 2 CREAM TOPICAL at 19:40

## 2018-01-15 RX ADMIN — Medication: at 13:02

## 2018-01-15 RX ADMIN — CETIRIZINE HYDROCHLORIDE 10 MG: 10 TABLET, FILM COATED ORAL at 08:41

## 2018-01-15 RX ADMIN — MUPIROCIN: 2 CREAM TOPICAL at 09:30

## 2018-01-15 NOTE — PROGRESS NOTES
Calorie Counts  Intake recorded for: 1/14 Kcals: 1124  Protein: 44g  # Meals Recorded: 100% coffee, 2 fruit cups, grapes, 25% white toast, less than 25% 2 vaughn strips  # Supplements Recorded: 100% 4 Glucernas

## 2018-01-15 NOTE — PROGRESS NOTES
Diabetes Consult Daily  Progress Note          Assessment/Plan:                          Christopher Cuevas is a 89 year old male with history of dementia, hypertension, CAD s/p CABG,  GERD, and a recent fall who was admitted to the NICU (1/7/2018) from OSH with concern for ICH. CT images suggestive of calcification vs ICH vs related to hyperglycemia.      Type 2 diabetes: poorly controlled with A1C > 14% ( 1/7/2018)  ANISHA on CKD: Cr on admission 3.30, Cr trending downward to 1.65 today      Plan  - increase lantus from  15 units to 17 units daily am   - Novolog prandial insulin from  1 unit per 15   - Novolog 1 unit per 40 > 140 before meals and > 200 HS  -monitor glucose before meals, HS and 0200  - having good glucose control is important, but due to age and underlying dementia, would run blood sugars higher to prevent hypoglycemia.      Will continue to follow                                    Interval History:   The last 24 hours progress and nursing notes reviewed.  Blood sugars at 0125, 196 and fasting 179/172  Blood sugars elevated, suspect 2/2 timing of prandial insulin  glucose 283, is a post prandial, RN tested glucose before snack of 30 grams) 2 units was given for snack  Appetite is variable, but is averaging 50 grams of CHO with most meals      Recent Labs  Lab 01/15/18  0125 01/14/18  2246 01/14/18  1700 01/14/18  1317 01/14/18  0853 01/14/18  0714 01/14/18  0200  01/13/18  0730  01/12/18  0705  01/11/18  0806  01/10/18  1049  01/09/18  0643   GLC  --   --   --   --   --  155*  --   --  149*  --  102*  --  218*  --  126*  --  147*   * 259* 314* 392* 166*  --  146*  < >  --   < >  --   < >  --   < >  --   < >  --    < > = values in this interval not displayed.            Review of Systems:   See interval hx          Medications:       Active Diet Order      Regular Diet Adult     Physical Exam:  Gen: NAD   HEENT:  mucous membranes are moist  Resp: Unlabored  Neuro: sleeping  BP  "130/62 (BP Location: Left arm)  Pulse 66  Temp 98.7  F (37.1  C) (Oral)  Resp 16  Ht 1.727 m (5' 8\")  Wt 74.3 kg (163 lb 14.4 oz)  SpO2 97%  BMI 24.92 kg/m2           Data:     Lab Results   Component Value Date    A1C 14.1 01/08/2018              CBC RESULTS:   Recent Labs   Lab Test  01/14/18   0714   WBC  3.3*   RBC  3.32*   HGB  10.4*   HCT  31.3*   MCV  94   MCH  31.3   MCHC  33.2   RDW  13.8   PLT  91*     Recent Labs   Lab Test  01/14/18   0714  01/13/18   0730   NA  146*  146*   POTASSIUM  4.1  3.7   CHLORIDE  118*  118*   CO2  21  17*   ANIONGAP  8  11   GLC  155*  149*   BUN  17  17   CR  1.61*  1.44*   LIZ  8.5  8.6     Liver Function Studies -   Recent Labs   Lab Test  01/09/18   0643   PROTTOTAL  5.6*   ALBUMIN  3.0*   BILITOTAL  0.6   ALKPHOS  56   AST  22   ALT  18     No results found for: INR    Jordi Lainezlenny -[id number]  Diabetes Management job code [id number]            "

## 2018-01-15 NOTE — PLAN OF CARE
Problem: Patient Care Overview  Goal: Plan of Care/Patient Progress Review  Discharge Planner OT   Patient plan for discharge: not stated  Current status: Pt with improving ability to follow simple commands and participate in ADLs though still very impulsive and needs specific cuing for safety with activity. Pt able to complete functional transfers and mobility to/from bathroom with min A using FWW. Pt required mod A for toileting and LE dressing; CGA-min A for standing ADLs at sink - noted difficulty with RUE chorea during tasks.  Barriers to return to prior living situation: weakness, impaired cognition, balance deficits, incoordination  Recommendations for discharge: TCU  Rationale for recommendations: to increase pt's safety and (I) with ADL/IADLs       Entered by: Kim Posada 01/15/2018 4:22 PM

## 2018-01-15 NOTE — PLAN OF CARE
Problem: Patient Care Overview  Goal: Plan of Care/Patient Progress Review  Discharge Planner PT   Patient plan for discharge: TCU  Current status: Pt demonstrating improved command following and endurance with mobility this day. Pt tx supine>sit at EOB with min A, STS with CGA and FWW, and amb ~400 ft with FWW and min A. Mod VCs for safety awareness and appropriate FWW management required throughout; pt receptive to this.  Barriers to return to prior living situation: Pt requiring Ax1 and VCs for safety with all transfers and amb.  Recommendations for discharge: TCU  Rationale for recommendations: Pt would benefit from rehab stay to progress motor planning, stability and endurance with functional mobility.       Entered by: Samantha Esparza 01/15/2018 4:38 PM

## 2018-01-15 NOTE — PLAN OF CARE
"Problem: Patient Care Overview  Goal: Plan of Care/Patient Progress Review    /65 (BP Location: Left arm)  Pulse 66  Temp 98.3  F (36.8  C) (Oral)  Resp 16  Ht 1.727 m (5' 8\")  Wt 74.3 kg (163 lb 14.4 oz)  SpO2 96%  BMI 24.92 kg/m2     [id number] Tmax 99.1, although under blankets; temp down to 98.3 by 4am; BP was 154/88 at start of shift but upon recheck before administration of labetalol, BP 99/75; no PRN BP meds given; OVSS on RA, no s/s of distress. Pt disoriented to time, place, situation; only aware of self and frequently forgetful but is able to follow simple commands and express wants/needs; calm and cooperative with cares. Pt denied pain. Denied n/v. Scheduled creams applied to pt's right outer knee and right elbow--saran wrapped in place. Up x1 assist and tolerating well; was up to chair early in evening; bedside attendant overnight. Voided about 300ml hebert, odorous urine this shift; had urinary incontinence x1 this morning. Bladder scanned for 45ml--aster Stein MD notified via text page. No BMs overnight. LPIV patent with MIV @75ml/hr. AC/HS BG with carb coverage; bedtime  and covered per SS; and 2am spot check 196. Slept most of shift without complaint; however bedside attendant noticed that pt's breathing changes when he's sleeping on his back; this writer also witnessed that pt intermittently gasps for air while lying supine--sticky note left for team to address/assess if pt has sleep apnea--aster LLAMAS notified of this as well via text page. Seizure pads in place. Pt resting quietly now, call light and belongings within reach. Will continue to monitor and continue POC/notify team as needed.    Addendum @2301; per aster LLAMAS, continue to monitor pt UOP for now; no change to IV fluids but will have day team assess. Aster LLAMAS will also pass on to day team to address if pt needs outpatient sleep study to assess for CARLOS. Will continue to monitor.   "

## 2018-01-15 NOTE — PLAN OF CARE
Problem: Patient Care Overview  Goal: Plan of Care/Patient Progress Review  Outcome: Improving  V/S/S on RA. BG (180, 464), sliding scale and carb coverage insulin was given. Denies pain and nausea. Regular diet, tolerating well. Likes the glycerna supplements. Needs to increase PO fluid intake. PIV in left arm, saline locked. Had no urine output on my shift. MD notified. Rash appears to be getting better. On right side. Wrap around bony prominences, knee, ankle, and elbow after applying sodium hypochlorite. Wrap is off now. Worked with PT today and did well. No SOB and ambulated with walker in hallway. Did well without sitter today. MD met with patient and family. Plan is to continue to monitor his BG, hoping it comes down and they can decrease the insulin regimen. Looking at discharging to TCU or assisted living, when discharge.  Continue with plan of care. Notify MD of any changes.

## 2018-01-15 NOTE — PLAN OF CARE
"Problem: Patient Care Overview  Goal: Plan of Care/Patient Progress Review  /62 (BP Location: Left arm)  Pulse 105  Temp 98.7  F (37.1  C) (Axillary)  Resp 16  Ht 1.727 m (5' 8\")  Wt 74.3 kg (163 lb 14.4 oz)  SpO2 92%  BMI 24.92 kg/m2  Disoriented to time, place or situation. Uncontrolled movements of right arm and leg. Denies pain or nausea. Tolerating diet with fair appetite; calorie counts in place. Blood glucose monitoring AC/HS; hyperglycemia today due to uncovered carbohydrate intake. No BM this shift. Voiding spontaneously; incontinent at times. 1/2 NS @ 75 ml/hr via PIV. Up with assist of one, walker and gait belt. Regional rash to right ankle, knee and elbow; medicated cream applied per orders and wrapped in saran wrap to maintain moisture.  at bedside. Family present this morning; supportive and involved in care. Will continue to monitor and follow plan of care.       "

## 2018-01-15 NOTE — PLAN OF CARE
Problem: Patient Care Overview  Goal: Plan of Care/Patient Progress Review  Interdisciplinary Non-Pharmacological Management of Delirium:     General Supportive Measures: Ensure adequate hydration and nutrition. Schedule toileting. Appropriate assessment and treatment of pain.   Re-[place] Patient: Ensure clock has correct time and white board has correct date. Communicate clearly, providing explanations as appropriate. Encourage presence of family members for reassurance. Have family/caregiver bring in familiar objects/pictures.  Cognition: Engage in appropriate meaningful communication and activities with patient (current events discussion, word games, magazines, newspapers).   Sensory: Use eyeglasses, hearing aids, or voice amplifiers as appropriate.   Avoid Use of Physical Restraints as Appropriate: Indicate need and frequently re-assess byrnes catheters and other tethers (cap IVs if medically appropriate).   Maintain Mobility and Self Care Ability: Avoid bedrest. Have patient up in chair for meals if appropriate.   Normalize Sleep-Wake Cycle: Discourage too much daytime napping (less than 30 minutes at a time), aim for uninterrupted periods of sleep at night.   Days: Keep room well light with lights on and shades open.   Night: Keep room quiet with low level lighting.   For Agitation: Avoid overstimulating environment, try music, massage, appropriate TV stations, and relaxation techniques. Take patient for a walk if appropriate, even if in the middle of the night.   Safety Concerns: Patients with delirium are at high risk for falls. Use bed and chair alarms along with frequent surveillance.

## 2018-01-15 NOTE — PROGRESS NOTES
Brief Progress Note:    S:  Rash appears better per RN. Not bothersome to patient. No very pruritic. No burning or pain.    O:  Focused exam of lower extremities upper extremities and trunk performed:  Significant for:  -Scaly pink plaque lateral R leg with eroded papules and linear excoriations  -Pink scaly patches in lace-like pattern on the below the knees bilaterally  -Improved erythema of right upper extremity  -R abdomen with faint pink patch    A/P:  1. Eczematous rash- Morphologically appears to be 2/2 eczema craquele with possible component of nutritional deficiency. Still with no classic morphology for drug rash. Relatively spares L side of body. For persistent plaques would step up topical steroid to a more potent one and start dilute bleach soaks. If doesn't improve in a couple days or acutely worsens, would consider biopsy.  -Change triamcinolone 0.1% ointment to Lidex 0.05% ointment BID to areas affected by the rash  -Start bleach soaks to apply after steroid ointment and keep for 2 hours (use 2 teaspoons of non-scented bleach to a gallon of water or alternatively soak towel in Dakin's solution)  -Continue Vanicream  -Optimize nutritional status     2. History of NMSC/?melanoma  -F/u with dermatology outpatient    Kiara Zelaya MD  Internal Medicine-Dermatology, PGY-2    Discussed with Dr. Pino    I have discussed this patient and agree with Dr. Zelaya's documentation and plan of care. I have reviewed and amended the resident's note above. The documentation accurately reflects my diagnoses, treatment and follow-up plans.     Alejandra Pino MD  Dermatology Staff

## 2018-01-15 NOTE — PROGRESS NOTES
[facility], [place]    Internal Medicine Progress Note - Gold Service      Assessment & Plan   Christopher Cuevas is a 89 year old male admitted on 1/8/2018. He has PMH of Dementia, HTN, CAD s/p CABG, GERD, BPH and recent witnessed fall who was admitted to the NICU (1/7/17) from OSH with concern for ICH on CT. Patient admitted to NICU for further evaluation. CT images suggestive of calcification vs ICH vs related to hyperglycemia. Patient transferred out of the ICU and transferred to medicine for further care.      Plans for today;  - patient in am sleepy after seroquel- will try off seroquel   -agitation and restless improved   -delirium is getting better  -switched to insulin basal bolus regimen   -d/c sitter today     # RUE choreoathetosis, witnessed fall, Concern for ICH in patient with Dementia:  #Altered mental status   Pt. Presented to OSH after suffering a mechanical fall from standing (1/7/17) with reported head trauma and CT with concern for ICH. Family noted RU and possible RLE uncontrolled movement PTA. On admission to NICU CT with hyperdensity of the left putamen, diffuse cerebral volume loss and cerebral white matter changes consistent with chronic small vessel disease. Repeat CT with re demonstration of diffuse hyperdensity of the left putamen- likely remineralization, though f/u with MRI recommended.   Overall the findings related to non ketotic hyperglycemia which can cause choreoathetoid movements   MRI obtained that showed asymmetric hyperdensity in the basal ganglia on CT (which is likely related to prolonged hyperglycemia)  - continue with hyperglycemia management as below  -still delirious but improving ; not agitated anymore   -fall precautions; delirium precautions   -trying to maintain sleep-wake cycle -continued with trazodone 50 mg -plan to go up if not working today  -PT/OT involved   -metabolic workup so far negative  -resume aspirin   -plan to d/c sitter today    -will need neurology follow up as an outpatient to see how much improvement he will have just with blood glucose control  -ongoing chorea which will take time so holding off on starting medications for that   -discussed with neurology again  and will take atleast 1 month to see real improvement with symptoms despite glucose control so will need follow up to see how he does with chorea                       -his agitation and restlessness is much better and chorea is also better                     -due to restless ness he was started on seroquel 12.5 mg PO daily ; I think it was more of time than meds than helped with agitation and chorea plan to observe how he does without seroquel which was d/pito today ; if worsening chorea or restlessness seroquel can  be started again                    -off sitter (from 1/15); SW helping to arrange for TCU  Needs follow up with neurology in 1 month to see how he does in terms of choreoathetosis movement      # Hyperglycemia, DMII     HHS:    Resolved; suspect this is an ongoing and chronic issue  Hba1c of 14.1  Resolving and transitioned to basal bolus regimen; glargine 17 units along with novolog 1 unit per 15 gm prandially and sliding scale   Endocrinology will follow     Will need endocrinology follow up in 2-3 months for DM and adrenal nodule which was found incidentally         #Eczema  Per nursing, patient with erythematous rash on bilateral UE with slight extension to the back. - Closely monitor  - started on triamcinolone 0.1% BID and emollient as per derm recs- also instructed nursing to put saran wrap with triamcinolone   -dermatology was consulted- eczematous type rash   -will need dermatology follow up as an outpatient in 1-2 months   -started on cetrizine 10 mg PO BID  -continue for few more days and can be d/pito if rash improving       # Hypercalcemia: Ca on admission 10.9 --> 10.5--> 9.8.   -resolved ; secondary to dehydration and use of thiazides   - Continue  IVF  ---ionized calcium and calcium level was within normal range after hydration; pending PTH level  -vitamin D2 level is low - started on cholecalciferol 1000 units daily     Concern for hypercalcemia which can be attributed by HCTZ, along with delirium and chorea; daughter did mention that he had weight loss in 6 months   To r/o underlying malignancy sent for CT chest, abdomen and pelvis -no real evidence of malignancy   Does have multiple non obstructing stones-urology consulted and will need outpatient follow up;  Also adrenal nodule which is an incidental findings-discussed with endocrinology about that -will follow as an outpatient      # Pancytopenia : Platelets 139 --> 111, also noted hemoglobin and WBC is dropping  -still could be dilutional with IV fluids  -work up initiated with vitamin b12, iron panel and ferritin, LDH, haptoglobin, d-dimer, peripheral smear and retics -which is unremarkable and consistent with anemia of chronic disease   -so far negative and no concern for hemolysis  -retics is inappropriate for the level of anemia which could be related to CKD        # HTN, CAD s/p CABG: No hx on file.    PTA ASA, lisinopril, HCTZ.   -will continue to hold HCTZ and lisinopril due to dehydration;   -started on amlodipine 5mg -remained stable with 5 mg amlodipine      # ANISHA on CKD: Cr on admission 3.30 BUN elevated to 70 BL appears  ~ 2.0. Cr down to 2.10 --> 1.6 with IVF. Suspect prerenal in setting of hypovolemia.   - also has known h/o CKD with uncontrolled diabetes  -some improvement with hydration but majority is CKD  -in euvolemic state- will d/c IV fluids   -creatinine to 1.6 which is near to baseline     # BPH: PTA Flomax.        Diet: Regular Diet Adult  Calorie Counts  Snacks/Supplements Adult: Glucerna (Adult); With Meals  Fluids: IV fluids   DVT Prophylaxis: chemical prophylaxis holding off due to low platelets   Code Status: DNR/DNI    Disposition Plan   Expected discharge: 2-3 days ,  recommended to transitional care unit once off sitter and mental status is better ; d/c sitter today and mental status clearing      Entered: Cinthia Mckeon 01/15/2018, 3:06 PM   Information in the above section will display in the discharge planner report.      The patient's care was discussed with the Bedside Nurse and daughter     Cinthia Mckeon  Internal Medicine Staff Hospitalist Service  [facility]  Pager: [phone]   Please see sticky note for cross cover information    Interval History    Improved than before ; stable and chorea improved on right side       Last 24 hr care team notes reviewed.   ROS:  4 point ROS including Respiratory, CV, GI and , other than that noted in the HPI, is negative.     Data reviewed today: I reviewed all medications, new labs and imaging results over the last 24 hours. I personally reviewed CT head with subtle asymmetric diffuse hyperdensity   Physical Exam   Vital Signs: Temp: 98.6  F (37  C) Temp src: Oral BP: 129/63 Pulse: 66 Heart Rate: 72 Resp: 16 SpO2: 98 % O2 Device: None (Room air)    Weight: 163 lbs 14.4 oz  General Appearance: Pt confused but awake and alert   Respiratory: b/l equal breath sounds with no added sounds   Cardiovascular: s1s2 with no murmur   GI: soft, non tender, bowel sounds noted   Skin: presence of maculopapular rash in back and upper extremity ; better in terms of redness in right upper extremity  Neuro: AAOx1, b/l UE and LE-4/5; non focal           Data   Data     Recent Labs  Lab 01/15/18  0804 01/14/18  0714 01/13/18  0730  01/09/18  0643   WBC 2.6* 3.3* 3.7*  < > 5.9   HGB 9.6* 10.4* 10.8*  < > 11.5*   MCV 95 94 93  < > 93   PLT 81* 91* 84*  < > 102*    146* 146*  < > 137   POTASSIUM 3.8 4.1 3.7  < > 3.5   CHLORIDE 114* 118* 118*  < > 107   CO2 21 21 17*  < > 20   BUN 17 17 17  < > 50*   CR 1.46* 1.61* 1.44*  < > 2.10*   ANIONGAP 7 8 11  < > 11   LIZ 8.0* 8.5 8.6  < > 9.5   * 155* 149*  < > 147*   ALBUMIN  --   --    --   --  3.0*   PROTTOTAL  --   --   --   --  5.6*   BILITOTAL  --   --   --   --  0.6   ALKPHOS  --   --   --   --  56   ALT  --   --   --   --  18   AST  --   --   --   --  22   < > = values in this interval not displayed.

## 2018-01-16 ENCOUNTER — APPOINTMENT (OUTPATIENT)
Dept: OCCUPATIONAL THERAPY | Facility: CLINIC | Age: 83
DRG: 637 | End: 2018-01-16
Attending: PSYCHIATRY & NEUROLOGY
Payer: COMMERCIAL

## 2018-01-16 ENCOUNTER — APPOINTMENT (OUTPATIENT)
Dept: PHYSICAL THERAPY | Facility: CLINIC | Age: 83
DRG: 637 | End: 2018-01-16
Attending: PSYCHIATRY & NEUROLOGY
Payer: COMMERCIAL

## 2018-01-16 ENCOUNTER — PRE VISIT (OUTPATIENT)
Dept: UROLOGY | Facility: CLINIC | Age: 83
End: 2018-01-16

## 2018-01-16 LAB
ANION GAP SERPL CALCULATED.3IONS-SCNC: 8 MMOL/L (ref 3–14)
BUN SERPL-MCNC: 15 MG/DL (ref 7–30)
CALCIUM SERPL-MCNC: 8.1 MG/DL (ref 8.5–10.1)
CHLORIDE SERPL-SCNC: 112 MMOL/L (ref 94–109)
CO2 SERPL-SCNC: 22 MMOL/L (ref 20–32)
CREAT SERPL-MCNC: 1.34 MG/DL (ref 0.66–1.25)
ERYTHROCYTE [DISTWIDTH] IN BLOOD BY AUTOMATED COUNT: 13.8 % (ref 10–15)
GFR SERPL CREATININE-BSD FRML MDRD: 50 ML/MIN/1.7M2
GLUCOSE BLDC GLUCOMTR-MCNC: 198 MG/DL (ref 70–99)
GLUCOSE BLDC GLUCOMTR-MCNC: 209 MG/DL (ref 70–99)
GLUCOSE BLDC GLUCOMTR-MCNC: 226 MG/DL (ref 70–99)
GLUCOSE BLDC GLUCOMTR-MCNC: 240 MG/DL (ref 70–99)
GLUCOSE BLDC GLUCOMTR-MCNC: 464 MG/DL (ref 70–99)
GLUCOSE SERPL-MCNC: 180 MG/DL (ref 70–99)
HCT VFR BLD AUTO: 29.1 % (ref 40–53)
HGB BLD-MCNC: 9.7 G/DL (ref 13.3–17.7)
MCH RBC QN AUTO: 31.5 PG (ref 26.5–33)
MCHC RBC AUTO-ENTMCNC: 33.3 G/DL (ref 31.5–36.5)
MCV RBC AUTO: 95 FL (ref 78–100)
PLATELET # BLD AUTO: 79 10E9/L (ref 150–450)
POTASSIUM SERPL-SCNC: 4.2 MMOL/L (ref 3.4–5.3)
RBC # BLD AUTO: 3.08 10E12/L (ref 4.4–5.9)
SODIUM SERPL-SCNC: 142 MMOL/L (ref 133–144)
WBC # BLD AUTO: 2.7 10E9/L (ref 4–11)

## 2018-01-16 PROCEDURE — 40000193 ZZH STATISTIC PT WARD VISIT

## 2018-01-16 PROCEDURE — 85027 COMPLETE CBC AUTOMATED: CPT | Performed by: INTERNAL MEDICINE

## 2018-01-16 PROCEDURE — 25000132 ZZH RX MED GY IP 250 OP 250 PS 637: Performed by: CLINICAL NURSE SPECIALIST

## 2018-01-16 PROCEDURE — 97530 THERAPEUTIC ACTIVITIES: CPT | Mod: GP

## 2018-01-16 PROCEDURE — 99233 SBSQ HOSP IP/OBS HIGH 50: CPT | Performed by: INTERNAL MEDICINE

## 2018-01-16 PROCEDURE — 97116 GAIT TRAINING THERAPY: CPT | Mod: GP

## 2018-01-16 PROCEDURE — 12000025 ZZH R&B TRANSPLANT INTERMEDIATE

## 2018-01-16 PROCEDURE — 25000132 ZZH RX MED GY IP 250 OP 250 PS 637: Performed by: PHYSICIAN ASSISTANT

## 2018-01-16 PROCEDURE — 97530 THERAPEUTIC ACTIVITIES: CPT | Mod: GO | Performed by: OCCUPATIONAL THERAPIST

## 2018-01-16 PROCEDURE — 25000132 ZZH RX MED GY IP 250 OP 250 PS 637: Performed by: INTERNAL MEDICINE

## 2018-01-16 PROCEDURE — 25000132 ZZH RX MED GY IP 250 OP 250 PS 637: Performed by: STUDENT IN AN ORGANIZED HEALTH CARE EDUCATION/TRAINING PROGRAM

## 2018-01-16 PROCEDURE — 40000133 ZZH STATISTIC OT WARD VISIT: Performed by: OCCUPATIONAL THERAPIST

## 2018-01-16 PROCEDURE — 80048 BASIC METABOLIC PNL TOTAL CA: CPT | Performed by: INTERNAL MEDICINE

## 2018-01-16 PROCEDURE — 97535 SELF CARE MNGMENT TRAINING: CPT | Mod: GO | Performed by: OCCUPATIONAL THERAPIST

## 2018-01-16 PROCEDURE — 36415 COLL VENOUS BLD VENIPUNCTURE: CPT | Performed by: INTERNAL MEDICINE

## 2018-01-16 PROCEDURE — 00000146 ZZHCL STATISTIC GLUCOSE BY METER IP

## 2018-01-16 PROCEDURE — 97110 THERAPEUTIC EXERCISES: CPT | Mod: GP

## 2018-01-16 RX ADMIN — CETIRIZINE HYDROCHLORIDE 10 MG: 10 TABLET, FILM COATED ORAL at 19:09

## 2018-01-16 RX ADMIN — Medication: at 19:02

## 2018-01-16 RX ADMIN — ASPIRIN 81 MG CHEWABLE TABLET 81 MG: 81 TABLET CHEWABLE at 09:00

## 2018-01-16 RX ADMIN — PRAVASTATIN SODIUM 20 MG: 20 TABLET ORAL at 19:09

## 2018-01-16 RX ADMIN — CETIRIZINE HYDROCHLORIDE 10 MG: 10 TABLET, FILM COATED ORAL at 09:01

## 2018-01-16 RX ADMIN — Medication: at 14:01

## 2018-01-16 RX ADMIN — FLUOCINONIDE: 0.5 OINTMENT TOPICAL at 19:02

## 2018-01-16 RX ADMIN — TRAZODONE HYDROCHLORIDE 50 MG: 50 TABLET ORAL at 21:19

## 2018-01-16 RX ADMIN — Medication: at 09:02

## 2018-01-16 RX ADMIN — VITAMIN D, TAB 1000IU (100/BT) 2000 UNITS: 25 TAB at 11:00

## 2018-01-16 RX ADMIN — Medication: at 11:00

## 2018-01-16 RX ADMIN — MUPIROCIN: 2 CREAM TOPICAL at 09:11

## 2018-01-16 RX ADMIN — MULTIPLE VITAMINS W/ MINERALS TAB 1 TABLET: TAB at 09:00

## 2018-01-16 RX ADMIN — LINAGLIPTIN 5 MG: 5 TABLET, FILM COATED ORAL at 17:11

## 2018-01-16 RX ADMIN — Medication: at 19:09

## 2018-01-16 RX ADMIN — MUPIROCIN: 2 CREAM TOPICAL at 19:09

## 2018-01-16 RX ADMIN — TAMSULOSIN HYDROCHLORIDE 0.4 MG: 0.4 CAPSULE ORAL at 08:59

## 2018-01-16 RX ADMIN — FLUOCINONIDE: 0.5 OINTMENT TOPICAL at 09:01

## 2018-01-16 RX ADMIN — AMLODIPINE BESYLATE 5 MG: 5 TABLET ORAL at 09:01

## 2018-01-16 NOTE — PROGRESS NOTES
SPIRITUAL HEALTH SERVICES  SPIRITUAL ASSESSMENT Progress Note  [facility] 7A     REFERRAL SOURCE: Chart review    Attempted visit. Pt was sleeping and unavailable.     PLAN: Will attemept to visit Ba again this week and 1X/week as long as he remains on 7A.    Felisha Mlaone   Intern  Pager [phone]

## 2018-01-16 NOTE — PLAN OF CARE
Problem: Patient Care Overview  Goal: Plan of Care/Patient Progress Review    Discharge Planner OT   Patient plan for discharge: home  Current status: Ambulating in hallway with CGA and FWW, and tolerating standing ADLs well, although confused and impulsive, needing single step commands for sequencing tasks.  Barriers to return to prior living situation: cognition, decreased ADL independence and activity tolerance  Recommendations for discharge: TCU  Rationale for recommendations: increase functional cognition and endurance, increase ADL independence       Entered by: Terell Heaton 01/16/2018 11:05 AM

## 2018-01-16 NOTE — PROGRESS NOTES
"                     Diabetes Consult Daily  Progress Note          Assessment/Plan:                          Christopher Cuevas is a 89 year old male with recent diagnosis of type 2 diabetes, history of dementia, hypertension, CAD s/p CABG,  GERD, and a recent fall who was admitted to the MICU (1/7/2018) from OSH with concern for ICH. CT images suggestive of calcification vs ICH vs related to hyperglycemia.        Pt's fasting glucose improved to target range, but post-prandial hyperglycemia remains problematic.  Timing of aspart has been difficult.      Plan  - continue glargine 17 units daily am for now  - aspart prandial insulin increase from 1 unit per 15 grams carb to 1 per 10, give promptly at end of eating  - start linagliptin 5 mg daily, first dose now  - aspart 1 unit per 40 > 140 before meals and > 200 HS  - monitor glucose before meals, HS and 0200  - having good glucose control is important, but due to age and underlying dementia, target 125-250 likely appropriate    - SW, CC, and pharmacy liaison help sought for information on how to proceed with prior authorization for Victoza when pt headed to facility  If able to determine Victoza access, using Victoza alone would be preferable to glargine plus linagliptin.     Will continue to follow   Discussed with pt, RN, primary team                                   Interval History:   The last 24 hours progress and nursing notes reviewed.  ANISHA on CKD: Cr on admission 3.30, Cr steadily improving    PTA pt had been recently \"diagnosed\" with diabetes and started on glargine.  Dghtr explained pt had been given label of \"pre-diabetes\" in the past.  Also reported he eats tons of sweets.   Note A1C abnormal in summer 2016-  Appears was checked when pt hospitalized at [facility].    Pt eating meals (two pancakes this morning) and taking Glucerna.  Sometime supp with meal, sometimes as a snack.  Today bfast delayed d/t OT session.  RN estimates pt got aspart half hour " "after start of eating.  BG was then checked nearly immediately after end of meal and found to be >400.  There is question of what level of assistance patient needs to be safe on discharge: TCU versus back to his AL (wife lives there) potentially with increased services.    Multiple daily injections not realistic for patient after discharge (neither is it going well inpatient).  Renal function and desire to avoid hypoglycemia limit options for non-insulin therapies.  Victoza requires a prior authorization.  PharmD was uncertain what the process would be for pt needing PA who is discharging to TCU/AL.  Tradjenta is a 40.00 copay.      Recent Labs  Lab 01/16/18  1400 01/16/18  1138 01/16/18  0646 01/16/18  0153 01/15/18  2155 01/15/18  1754 01/15/18  1405  01/15/18  0804  01/14/18  0714  01/13/18  0730  01/12/18  0705  01/11/18  0806   GLC  --   --  180*  --   --   --   --   --  179*  --  155*  --  149*  --  102*  --  218*   * 464*  --  226* 244* 231* 283*  < >  --   < >  --   < >  --   < >  --   < >  --    < > = values in this interval not displayed.            Review of Systems:   See interval hx          Medications:       Active Diet Order      Regular Diet Adult     Physical Exam:  Gen: NAD   HEENT:  mucous membranes are moist  Resp: Unlabored  Neuro: sleeping  /69 (BP Location: Left arm)  Pulse 66  Temp 97.8  F (36.6  C) (Oral)  Resp 18  Ht 1.727 m (5' 8\")  Wt 75.9 kg (167 lb 4.8 oz)  SpO2 98%  BMI 25.44 kg/m2           Data:     Lab Results   Component Value Date    A1C 14.1 01/08/2018                  Recent Labs   Lab Test  01/16/18   0646  01/15/18   0804   NA  142  142   POTASSIUM  4.2  3.8   CHLORIDE  112*  114*   CO2  22  21   ANIONGAP  8  7   GLC  180*  179*   BUN  15  17   CR  1.34*  1.46*   LIZ  8.1*  8.0*     CBC RESULTS:   Recent Labs   Lab Test  01/16/18   0646   WBC  2.7*   RBC  3.08*   HGB  9.7*   HCT  29.1*   MCV  95   MCH  31.5   MCHC  33.3   RDW  13.8   PLT  79*     Liver " Function Studies -   Recent Labs   Lab Test  01/09/18   0643   PROTTOTAL  5.6*   ALBUMIN  3.0*   BILITOTAL  0.6   ALKPHOS  56   AST  22   ALT  18     I spent a total of 35 minutes bedside and on the inpatient unit managing the glycemic care of Christopher Florenciosarah. Over 50% of my time on the unit was spent counseling the patient and/or coordinating care regarding options for outpatient glucose management given pt's cognitive limitations and desire to return to prior level of care.  See note for details.  Melody Lambert APRN [id number]    Diabetes Management job code [id number]

## 2018-01-16 NOTE — PROGRESS NOTES
Calorie Counts  Intake recorded for: 1/15  Kcals: 1078  Protein: 46g  # Meals Recorded: 100% tomato soup, orange juice, 2 coffees, 50% ham & cheese omelet, 25% grapes, whole milk  # Supplements Recorded: 100% 2.5 Glucernas

## 2018-01-16 NOTE — PROGRESS NOTES
[facility], [place]    Internal Medicine Progress Note - Gold Service      Assessment & Plan   Christopher Cuevas is a 89 year old male with a PMH of dementia, HTN, CAD s/p CABG, BPH and GERD who was transferred to [facility] on 1/8/2018 for confusion and concern for intracranial hemorrhage.  ICH was ruled out by imaging, and the leading differential diagnosis for his confusion is uncontrolled hyperglycemia secondary to recently diagnosed Type 2 DM.      Plans for today:  Starting Tradjenta and monitoring glucoses with plan to try to simplify BG control regimen.    # RUE choreoathetosis, witnessed fall, Metabolic Encephalopathy:   Pt. Presented to OSH after suffering a mechanical fall from standing (1/7/17) with reported head trauma and CT with concern for ICH. Family noted RU and possible RLE uncontrolled movement PTA. On admission to NICU CT with hyperdensity of the left putamen, diffuse cerebral volume loss and cerebral white matter changes consistent with chronic small vessel disease. Repeat CT with re demonstration of diffuse hyperdensity of the left putamen- likely remineralization. MRI obtained that showed asymmetric hyperdensity in the basal ganglia on CT (which is likely related to prolonged hyperglycemia) Overall the findings are consistent with non ketotic hyperglycemia which can cause choreoathetoid movements.  - continue with hyperglycemia management as below  - no delirium or agitation today. Mental status at prior baseline today.  - fall precautions; delirium precautions continued  - trying to maintain sleep-wake cycle -continued with trazodone 50 mg  - PT/OT involved   - continue aspirin   - may not need neurology follow up as an outpatient if blood glucose control alone allows for stability - no chorea today - will continue to monitor   -off sitter (from 1/15); SW helping to arrange for dispo - worked well with PT today - may be appropriate for TCU versus transfer back to living  facility's memory care unit.      # Hyperglycemia, DMII, HHS:    Resolved HHS;   Hba1c of 14.1  - Transitioned to basal bolus regimen - but this would be difficult to administer in his current assisted living setting - so will either need simplification of his regimen or d/c to higher level of care than assisted living.  Discussed the potential need to d/c to higher level of care if unable to simplify his regimen, with daughter, Graciela, today, and she expressed understanding.   - Greatly appreciate Endocrinology consultation and recommendations.  - Will need endocrinology follow up in 2-3 months for DM and adrenal nodule which was found incidentally     #Eczema  Per nursing, patient with erythematous rash on bilateral UE with slight extension to the back. Dermatology was consulted- eczematous type rash  - Closely monitor - rash quite faint today  - started on triamcinolone 0.1% BID and emollient as per derm recs- also instructed nursing to put saran wrap with triamcinolone   - will need dermatology follow up as an outpatient in 1-2 months   - started on cetrizine 10 mg PO BID  -  can be d/pito if rash resolves      # Hypercalcemia: Ca on admission 10.9 --> 10.5--> 9.8. Concern for hypercalcemia which can be attributed by HCTZ, along with delirium and chorea; daughter did mention that he had weight loss in 6 months. To r/o underlying malignancy sent for CT chest, abdomen and pelvis -no real evidence of malignancy. Does have multiple non obstructing stones-urology consulted and will need outpatient follow up;  Also adrenal nodule which is an incidental findings-discussed with endocrinology about that -will follow as an outpatient   - hypercalcemia resolved ; secondary to dehydration and use of thiazides   - stop IVF today - will recheck Calcium tomorrow.  - vitamin D2 level is low - started on cholecalciferol 1000 units daily     # Pancytopenia : Platelets 139 --> 111, also noted hemoglobin and WBC is dropping  -still  could be dilutional with IV fluids  -work up initiated with vitamin b12, iron panel and ferritin, LDH, haptoglobin, d-dimer, peripheral smear and retics -which is unremarkable and consistent with anemia of chronic disease.  Peripheral smear not suggestive of MDS, though this remains on the differential.  -retics is inappropriate for the level of anemia which could be related to CKD  - continue to monitor        # HTN, CAD s/p CABG: No hx on file.    PTA ASA, lisinopril, HCTZ.   -will continue to hold HCTZ and lisinopril due to dehydration;   -started on amlodipine 5mg -remained stable with 5 mg amlodipine      # ANISHA on CKD: Cr on admission 3.30 BUN elevated to 70 BL appears  ~ 2.0. Cr down to 2.10 --> 1.6 with IVF. Suspect prerenal in setting of hypovolemia.   - also has known h/o CKD with uncontrolled diabetes  -some improvement with hydration but majority is CKD  -in euvolemic state- will d/c IV fluids   -creatinine to 1.6 which is near to baseline     # BPH: PTA Flomax.        Diet: Regular Diet Adult  Calorie Counts  Snacks/Supplements Adult: Glucerna (Adult); With Meals  Fluids: IV fluids   DVT Prophylaxis: chemical prophylaxis holding off due to low platelets   Code Status: DNR/DNI    Disposition Plan   Expected discharge: 2-3 days , recommended to TCU versus memory care center once glucose control regimen is determined and mental status continues to be at baseline.     Entered: Minor Gonsalez 01/16/2018, 8:16 AM   Information in the above section will display in the discharge planner report.      The patient's care was discussed with the Bedside Nurse , patient and daughter Graciela Gonsalez  Internal Medicine Staff Hospitalist Service  [facility]   Please see sticky note for cross cover information    Interval History    Alert this AM without any complaints.  Feels that he is stronger than he was when he came in.  No abnormal arm movements.    Last 24 hr care team notes reviewed.    ROS:  4 point ROS including Respiratory, CV, GI and , other than that noted in the HPI, is negative.     Data reviewed today: I reviewed all medications, new labs and imaging results over the last 24 hours. I personally reviewed CT head with subtle asymmetric diffuse hyperdensity   Physical Exam   Vital Signs: Temp: 97.9  F (36.6  C) Temp src: Oral BP: 136/67   Heart Rate: 70 Resp: 16 SpO2: 96 % O2 Device: None (Room air)    Weight: 163 lbs 14.4 oz  General Appearance: Alert and oriented to person, place, date and situation.  Respiratory: CTAB, no wheezes   Cardiovascular: s1s2 with no murmur   GI: soft, non tender, bowel sounds noted   Skin: presence of maculopapular rash in back and upper extremity  Neuro: AAOx1, b/l UE and LE-4/5; non focal           Data   Data     Recent Labs  Lab 01/16/18  0646 01/15/18  0804 01/14/18  0714   WBC 2.7* 2.6* 3.3*   HGB 9.7* 9.6* 10.4*   MCV 95 95 94   PLT 79* 81* 91*    142 146*   POTASSIUM 4.2 3.8 4.1   CHLORIDE 112* 114* 118*   CO2 22 21 21   BUN 15 17 17   CR 1.34* 1.46* 1.61*   ANIONGAP 8 7 8   LIZ 8.1* 8.0* 8.5   * 179* 155*

## 2018-01-16 NOTE — PROGRESS NOTES
Social Work Services Progress Note    Hospital Day: 9  Date of Initial Social Work Evaluation:  1/8/2018  Collaborated with:  Pts daughters, TCU's.    Data:  Pt needs TCU. Is now off bedside attendant.     Intervention/Assessment:  SW resent referrals to the following facilities now that pt is off bedside attendant.    1. [facility] ([facility]). P: [phone], F: [phone]. Declined pt, they feel like he will need longer term rehab. They also cannot accommodate pts memory issues/current cognitive deficits.  2. [facility] ([place]). P: [phone], F: [phone]. Declined due to pts memory issues. They do not feel comfortable with the level of dementia he has. They are recommending memory care TCU.  3. [facility]. P: [phone], F: [phone]. Referral sent and left message.  4. [facility] ([facility]): Referral sent.    SW also received message from pts daughter Sophy who asked that SW coordinate with pts other daughter Graciela today, P: [phone].    SW also left message for [facility] where pt lives to see if they contract with any facilities for TCU.    Plan:    Anticipated Disposition: TCU.    Barriers to d/c plan:  Medical stability.    Follow Up:  SW will continue to follow for discharge needs.    JANICE Bolton, LGSW  7A   Ph: [phone], Pager: [phone]

## 2018-01-16 NOTE — PLAN OF CARE
Problem: Patient Care Overview  Goal: Plan of Care/Patient Progress Review  Outcome: Improving  V/S/S on RA. Denies pain and nausea. Insulin coverage needed for BG(180, 464). On a regular diet, patient tolerating well. Good appetite. PIV in left arm, saline locked. Has been continent of urine and uses call light appropriately. Rashes appear to be getting better and the involuntary R arm/leg movements have also been controlled. Up with walker and gait belt. Worked with OT today, did well. No signs of SOB. Plan is to get blood sugars under control and then discharge maybe to TCU. Continue plan of care. Notify MD of any changes.

## 2018-01-16 NOTE — PLAN OF CARE
Problem: Patient Care Overview  Goal: Plan of Care/Patient Progress Review  Discharge Planner PT   Patient plan for discharge: not discussed  Current status: Pt very pleasant and agreeable to PT session however pt confused and impulsive with all mobility and needed max VC for safety awareness. Pt transferred supine>sit with CGA and able to sit EOB with SBA. Pt transferred sit<>stand with SBA and more safe when performing without FWW vs with. Pt amb 700 ft with FWW and CGA>SBA, required max VC for safe handling of FWW.  Barriers to return to prior living situation: impaired cognition/safety awareness, impaired standing balance-increased risk for falls  Recommendations for discharge: TCU  Rationale for recommendations: To improve IND and safety with functional mobility       Entered by: Celena Mckeon 01/16/2018 4:47 PM

## 2018-01-16 NOTE — PLAN OF CARE
Problem: Fall Risk (Adult)  Goal: Identify Related Risk Factors and Signs and Symptoms  Related risk factors and signs and symptoms are identified upon initiation of Human Response Clinical Practice Guideline (CPG).   Outcome: No Change  AVSS. Denies pain or nausea. Slept fairly well. Set bed alarm off x 2 when needing to urinate. Voided x 2. No incontinent episodes. No BM. At 0500, he asked what time it was and when he was told the time, he said that it was too early to get up. Then he went back to sleep. So seeming less confused.

## 2018-01-16 NOTE — PLAN OF CARE
Problem: Patient Care Overview  Goal: Plan of Care/Patient Progress Review  Outcome: No Change  3583-7282: Afebrile. Pt slightly hypertensive (140s/70s). BP recheck 137/68, no PRN BP medications given. OVSS on RA. Pt complaining of some heartburn at the beginning of the shift, PRN Tums given x1. Pt on regular diet and calorie counts, pt was able to eat some tomato soup for dinner, no complaints of nausea. BS checks AC and HS. BS check before dinner was 231, 3 units of insulin given per order parameters. Pt also received 1 unit of insulin to cover carbs. BS check before bedtime was 244, 1 unit of insulin given per order parameters. PIV L Forearm SL. Pt voiding adequate amounts, unable to accurately measure this shift due to one toilet void and pt missed the urinal once. No BM this shift, pt is passing flatus. Lower extremities wrapped this evening per wound care orders for 2 hours. Bedside attendant D/C around 1500 this shift. Pt continues to be confused, bed alarm and chair alarm on this shift, pt calling appropriately this shift. Pt up with assist x1, walker, and gait belt. Call light in reach. Will continue to monitor and follow plan of care.

## 2018-01-17 ENCOUNTER — DOCUMENTATION ONLY (OUTPATIENT)
Dept: PHARMACY | Facility: CLINIC | Age: 83
End: 2018-01-17

## 2018-01-17 ENCOUNTER — APPOINTMENT (OUTPATIENT)
Dept: PHYSICAL THERAPY | Facility: CLINIC | Age: 83
DRG: 637 | End: 2018-01-17
Attending: PSYCHIATRY & NEUROLOGY
Payer: COMMERCIAL

## 2018-01-17 LAB
ANION GAP SERPL CALCULATED.3IONS-SCNC: 8 MMOL/L (ref 3–14)
BASOPHILS # BLD AUTO: 0 10E9/L (ref 0–0.2)
BASOPHILS NFR BLD AUTO: 0.3 %
BUN SERPL-MCNC: 13 MG/DL (ref 7–30)
CA-I SERPL ISE-MCNC: 4.9 MG/DL (ref 4.4–5.2)
CALCIUM SERPL-MCNC: 8.9 MG/DL (ref 8.5–10.1)
CHLORIDE SERPL-SCNC: 110 MMOL/L (ref 94–109)
CO2 SERPL-SCNC: 22 MMOL/L (ref 20–32)
CREAT SERPL-MCNC: 1.38 MG/DL (ref 0.66–1.25)
DIFFERENTIAL METHOD BLD: ABNORMAL
EOSINOPHIL # BLD AUTO: 0.1 10E9/L (ref 0–0.7)
EOSINOPHIL NFR BLD AUTO: 2.5 %
ERYTHROCYTE [DISTWIDTH] IN BLOOD BY AUTOMATED COUNT: 13.7 % (ref 10–15)
GFR SERPL CREATININE-BSD FRML MDRD: 48 ML/MIN/1.7M2
GLUCOSE BLDC GLUCOMTR-MCNC: 158 MG/DL (ref 70–99)
GLUCOSE BLDC GLUCOMTR-MCNC: 159 MG/DL (ref 70–99)
GLUCOSE BLDC GLUCOMTR-MCNC: 177 MG/DL (ref 70–99)
GLUCOSE BLDC GLUCOMTR-MCNC: 224 MG/DL (ref 70–99)
GLUCOSE BLDC GLUCOMTR-MCNC: 253 MG/DL (ref 70–99)
GLUCOSE SERPL-MCNC: 159 MG/DL (ref 70–99)
HCT VFR BLD AUTO: 33.6 % (ref 40–53)
HGB BLD-MCNC: 11.3 G/DL (ref 13.3–17.7)
IMM GRANULOCYTES # BLD: 0 10E9/L (ref 0–0.4)
IMM GRANULOCYTES NFR BLD: 0 %
LYMPHOCYTES # BLD AUTO: 1 10E9/L (ref 0.8–5.3)
LYMPHOCYTES NFR BLD AUTO: 28.7 %
MCH RBC QN AUTO: 31.9 PG (ref 26.5–33)
MCHC RBC AUTO-ENTMCNC: 33.6 G/DL (ref 31.5–36.5)
MCV RBC AUTO: 95 FL (ref 78–100)
MONOCYTES # BLD AUTO: 0.2 10E9/L (ref 0–1.3)
MONOCYTES NFR BLD AUTO: 6.4 %
NEUTROPHILS # BLD AUTO: 2.2 10E9/L (ref 1.6–8.3)
NEUTROPHILS NFR BLD AUTO: 62.1 %
NRBC # BLD AUTO: 0 10*3/UL
NRBC BLD AUTO-RTO: 0 /100
PLATELET # BLD AUTO: 94 10E9/L (ref 150–450)
POTASSIUM SERPL-SCNC: 4.3 MMOL/L (ref 3.4–5.3)
RBC # BLD AUTO: 3.54 10E12/L (ref 4.4–5.9)
SODIUM SERPL-SCNC: 141 MMOL/L (ref 133–144)
WBC # BLD AUTO: 3.6 10E9/L (ref 4–11)

## 2018-01-17 PROCEDURE — 25000132 ZZH RX MED GY IP 250 OP 250 PS 637: Performed by: INTERNAL MEDICINE

## 2018-01-17 PROCEDURE — 25000132 ZZH RX MED GY IP 250 OP 250 PS 637: Performed by: STUDENT IN AN ORGANIZED HEALTH CARE EDUCATION/TRAINING PROGRAM

## 2018-01-17 PROCEDURE — 97116 GAIT TRAINING THERAPY: CPT | Mod: GP | Performed by: REHABILITATION PRACTITIONER

## 2018-01-17 PROCEDURE — 97110 THERAPEUTIC EXERCISES: CPT | Mod: GP | Performed by: REHABILITATION PRACTITIONER

## 2018-01-17 PROCEDURE — 40000193 ZZH STATISTIC PT WARD VISIT: Performed by: REHABILITATION PRACTITIONER

## 2018-01-17 PROCEDURE — 12000025 ZZH R&B TRANSPLANT INTERMEDIATE

## 2018-01-17 PROCEDURE — 97530 THERAPEUTIC ACTIVITIES: CPT | Mod: GP | Performed by: REHABILITATION PRACTITIONER

## 2018-01-17 PROCEDURE — 25000128 H RX IP 250 OP 636: Performed by: STUDENT IN AN ORGANIZED HEALTH CARE EDUCATION/TRAINING PROGRAM

## 2018-01-17 PROCEDURE — 25000132 ZZH RX MED GY IP 250 OP 250 PS 637: Performed by: CLINICAL NURSE SPECIALIST

## 2018-01-17 PROCEDURE — 80048 BASIC METABOLIC PNL TOTAL CA: CPT | Performed by: INTERNAL MEDICINE

## 2018-01-17 PROCEDURE — 85025 COMPLETE CBC W/AUTO DIFF WBC: CPT | Performed by: INTERNAL MEDICINE

## 2018-01-17 PROCEDURE — 82330 ASSAY OF CALCIUM: CPT | Performed by: INTERNAL MEDICINE

## 2018-01-17 PROCEDURE — 99232 SBSQ HOSP IP/OBS MODERATE 35: CPT | Performed by: INTERNAL MEDICINE

## 2018-01-17 PROCEDURE — 36415 COLL VENOUS BLD VENIPUNCTURE: CPT | Performed by: INTERNAL MEDICINE

## 2018-01-17 PROCEDURE — 00000146 ZZHCL STATISTIC GLUCOSE BY METER IP

## 2018-01-17 PROCEDURE — 25000132 ZZH RX MED GY IP 250 OP 250 PS 637: Performed by: PHYSICIAN ASSISTANT

## 2018-01-17 RX ORDER — DOCUSATE SODIUM 100 MG/1
200 CAPSULE, LIQUID FILLED ORAL 2 TIMES DAILY
Status: DISCONTINUED | OUTPATIENT
Start: 2018-01-17 | End: 2018-01-18 | Stop reason: HOSPADM

## 2018-01-17 RX ORDER — EMOLLIENT BASE
CREAM (GRAM) TOPICAL 4 TIMES DAILY
Status: DISCONTINUED | OUTPATIENT
Start: 2018-01-17 | End: 2018-01-18 | Stop reason: HOSPADM

## 2018-01-17 RX ORDER — LIRAGLUTIDE 6 MG/ML
0.6 INJECTION SUBCUTANEOUS DAILY
Status: DISCONTINUED | OUTPATIENT
Start: 2018-01-18 | End: 2018-01-18 | Stop reason: HOSPADM

## 2018-01-17 RX ORDER — SENNOSIDES 8.6 MG
8.6 TABLET ORAL 2 TIMES DAILY PRN
Status: DISCONTINUED | OUTPATIENT
Start: 2018-01-17 | End: 2018-01-18 | Stop reason: HOSPADM

## 2018-01-17 RX ADMIN — CETIRIZINE HYDROCHLORIDE 10 MG: 10 TABLET, FILM COATED ORAL at 09:11

## 2018-01-17 RX ADMIN — Medication: at 19:22

## 2018-01-17 RX ADMIN — IMIQUIMOD 0.25 G: 12.5 CREAM TOPICAL at 09:13

## 2018-01-17 RX ADMIN — CETIRIZINE HYDROCHLORIDE 10 MG: 10 TABLET, FILM COATED ORAL at 19:34

## 2018-01-17 RX ADMIN — TAMSULOSIN HYDROCHLORIDE 0.4 MG: 0.4 CAPSULE ORAL at 09:11

## 2018-01-17 RX ADMIN — DOCUSATE SODIUM 200 MG: 100 CAPSULE, LIQUID FILLED ORAL at 12:05

## 2018-01-17 RX ADMIN — AMLODIPINE BESYLATE 5 MG: 5 TABLET ORAL at 09:11

## 2018-01-17 RX ADMIN — Medication: at 19:32

## 2018-01-17 RX ADMIN — MUPIROCIN: 2 CREAM TOPICAL at 09:15

## 2018-01-17 RX ADMIN — MULTIPLE VITAMINS W/ MINERALS TAB 1 TABLET: TAB at 09:11

## 2018-01-17 RX ADMIN — Medication: at 12:06

## 2018-01-17 RX ADMIN — VITAMIN D, TAB 1000IU (100/BT) 2000 UNITS: 25 TAB at 09:11

## 2018-01-17 RX ADMIN — PRAVASTATIN SODIUM 20 MG: 20 TABLET ORAL at 19:34

## 2018-01-17 RX ADMIN — Medication: at 15:33

## 2018-01-17 RX ADMIN — FLUOCINONIDE: 0.5 OINTMENT TOPICAL at 09:12

## 2018-01-17 RX ADMIN — TRAZODONE HYDROCHLORIDE 50 MG: 50 TABLET ORAL at 21:38

## 2018-01-17 RX ADMIN — MUPIROCIN: 2 CREAM TOPICAL at 19:37

## 2018-01-17 RX ADMIN — DOCUSATE SODIUM 200 MG: 100 CAPSULE, LIQUID FILLED ORAL at 19:34

## 2018-01-17 RX ADMIN — HYDRALAZINE HYDROCHLORIDE 10 MG: 20 INJECTION INTRAMUSCULAR; INTRAVENOUS at 15:57

## 2018-01-17 RX ADMIN — FLUOCINONIDE: 0.5 OINTMENT TOPICAL at 19:22

## 2018-01-17 RX ADMIN — ASPIRIN 81 MG CHEWABLE TABLET 81 MG: 81 TABLET CHEWABLE at 09:12

## 2018-01-17 RX ADMIN — LINAGLIPTIN 5 MG: 5 TABLET, FILM COATED ORAL at 09:11

## 2018-01-17 NOTE — PROGRESS NOTES
Calorie Count  Intake recorded for: 1/16 Kcals: 1229  Protein: 53g  # Meals Recorded: 100% 2 coffees, cheeseburger, pancake with butter & syrup, 50% chicken noodle soup  # Supplements Recorded: 100% 2 Glucernas

## 2018-01-17 NOTE — PROGRESS NOTES
Social Work Services Progress Note    Hospital Day: 10  Date of Initial Social Work Evaluation:  1/8/2017  Collaborated with:  Pts daughters, medical team, TCU's.    Data:  Pt needs TCU at discharge. Pt unable to go back to [facility] due to sliding scale.    Intervention:  SW sent referrals/called the following facilities.    [facility]. P: [phone], F: [phone]. Declined due to no beds. They offered for SW to call back when pt is medically ready to see about beds.  [facility] ([facility]): Declined pt due to bed availability.  [facility], P (admissions) [phone], F: [phone]: Has beds available. Sent referral.  [facility]: P: [phone], F: [phone]. Has beds available. Sent referral.  [facility] ([place]), P: [phone], F: [phone]. No beds available.  [facility], P: [phone], F: [phone]. Sent referral and left message.    SW left message for RN at [facility]: [phone] to see if they contract with any facilities for TCU. CHERELLE also spoke with pts daughters via phone with [facility] and Dr. Gonsalez. Discussed how pts [facility] cannot take him back with his current sliding scale for blood sugar meds. Pts daughter understand and gave permission for SW to continue to look for facilities, even if they are in [facility].     Assessment: Pts daughters open to SW looking at several facilities, they now realize it is not feasible for pt to return to [facility] given his current needs.    Plan:    Anticipated Disposition: TCU.    Barriers to d/c plan:  Medical stability, bed availability.    Follow Up:  SW will continue to coordinate discharge plans.    JANICE Bolton, [facility]  [facility]   Ph: [phone], Pager: [phone]

## 2018-01-17 NOTE — PROGRESS NOTES
"/69 (BP Location: Left arm)  Pulse 88  Temp 97.9  F (36.6  C) (Oral)  Resp 18  Ht 1.727 m (5' 8\")  Wt 76.4 kg (168 lb 6.4 oz)  SpO2 98%  BMI 25.61 kg/m2    Alert & oriented to self and place, confused on time and situation. HTN OVSS on RA. Denies pain/nausea. Neuro's intact. No BM today, uses urinal at bedside with intermittent incontinence. Skin care performed today as ordered. Appetite is poor, offer glucerna with meds. BG's 159 & 224. Has not been spontaneous. Pleasant and cooperative with care.     Patient has one hearing aid but unfortunately the other hearing aid is missing.    "

## 2018-01-17 NOTE — DISCHARGE SUMMARY
[facility], [place]    Internal Medicine Discharge Summary- Gold Service    Date of Admission:  1/8/2018  Date of Discharge:  1/18/2018  Discharging Provider: Minor Gonsalez  Discharge Team: Gold 4    Discharge Diagnoses   Hyperosmolar Hyperglycemic State (HHS) (resolved) with choreoathetosis (resovled) and metabolic encephalopathy (resolved) secondary to uncontrolled Type 2 Diabetes (improving), complicated by  Baseline dementia (stable)  Acute Kidney Injury (resolved) overlying chronic kidney disease  Moderate dehydration with hypercalcemia (resolved)  Moderate to severe eczema (improving)  Pancytopenia (stable)  Hypertension with a history of coronary artery diease (stable)  Benign prostatic hypertrophy (stable)    Follow-ups Needed After Discharge   On Monday 1/22  Check BMP to confirm stability of renal function  Reduce Lantus to 11 units  Increase Victoza to 1.2 mg daily  Stop Novolog meal coverage  Continue Novolog correction scale until seen in clinic     On Wednesday 1/24  Endocrinology appt with Dr. Barrientos at 1:40 PM, at [facility] ([address] [place] [address]) ([phone]- call if need to reschedule)  Arrive 15 minutes early and bring glucose log and medication record from the TCU    Hospital Course   Christopher Cuevas is a 89 year old male with a PMH of dementia, HTN, CAD s/p CABG, BPH and GERD who was transferred to [facility] on 1/8/2018 for confusion and concern for intracranial hemorrhage.  ICH was ruled out by imaging, and the leading differential diagnosis for his confusion is uncontrolled hyperglycemia secondary to recently diagnosed Type 2 DM.  The following problems were addressed during his hospitalization:     # Hyperosmolar Hyperglycemic State (HHS) with choreoathetosis and metabolic encephalopathy secondary to uncontrolled Type 2 Diabetes:   Pt. Presented to OSH after suffering a mechanical fall from standing (1/7/17) with reported head trauma and CT with concern for  ICH. Family noted RU and possible RLE uncontrolled movement prior to admission. On admission to Neuro ICU, CT head showed hyperdensity of the left putamen, diffuse cerebral volume loss and cerebral white matter changes consistent with chronic small vessel disease. Repeat CT showed re-demonstration of diffuse hyperdensity of the left putamen - likely remineralization. MRI obtained that showed asymmetric hyperdensity in the basal ganglia on CT (which is likely related to prolonged hyperglycemia). Overall the findings are consistent with non ketotic hyperglycemia which can cause choreoathetoid movements. With tightened but not strict glucose control (goal blood glucoses 125-200), patient's delirium and agitation improved on 1/14 and choreoathetosis resolved by 1/16. Glucose control was initially achieved with a basal bolus regimen, including carb coverage and sliding scale with meals and at bedtime. A prior authorization was obtained for Victoza, and this medication was initiated in hopes that this might allow patient to eventually stop using insulin altogether. He will need weekly adjustment of this medication over the next several weeks as an outpatient.   - glargine decreased to 14 units today  - aspart prandial insulin decreased from 1 unit per 12 grams carb to 1 per 15, give promptly at end of eating  - aspart 1 unit per 40 > 140 before meals and > 200 HS  - monitor glucose before meals, HS and 0200  - having good glucose control is important, but due to age and underlying dementia, target 125-250 likely appropriate    # ANISHA on CKD: Cr on admission 3.30 BUN elevated to 70, baseline appears  ~ 2.0. Suspect prerenal in setting of hypovolemia.  Discontinued Thiazide for HTN and after rehydration, ANISHA improved. Now euvolemic and creatinine 1.6 (at baseline).     # Eczematous Rash  Patient with erythematous rash on bilateral UE with slight extension to the back. Dermatology was consulted- eczematous type rash. Started  on triamcinolone 0.1% BID and emollient as per derm recs. Complete rash care instructions: Twice daily apply soaked warm wash cloths to affected areas on arms and upper back, covered by a dry towel, and allow to sit for 10 minutes.  Remove wet wash cloths and pat dry area.  Then apply triamcinolone ointment, followed by a generous amount of emollient cream. Additional applications of emollient cream alone to be applied twice more daily.    - will need dermatology follow up as an outpatient in 1-2 months   - started on cetrizine 10 mg PO BID  -  can be d/pito if rash resolves     # Hypercalcemia: Ca on admission 10.9 --> 10.5--> 9.8. Concern for hypercalcemia which can be attributed by HCTZ, along with delirium and chorea; daughter did mention that he had weight loss in 6 months. To r/o underlying malignancy sent for CT chest, abdomen and pelvis -no real evidence of malignancy. Does have multiple non obstructing stones-urology consulted and will need outpatient follow up;  Also adrenal nodule which is an incidental findings-discussed with endocrinology about that -will follow as an outpatient      # Pancytopenia : Platelets 139 --> 111, also noted hemoglobin and WBC dropped, thought to be dilutional.  Work up initiated with vitamin b12, iron panel and ferritin, LDH, haptoglobin, d-dimer, peripheral smear and retics -which is unremarkable and consistent with anemia of chronic disease.  Peripheral smear not suggestive of MDS, though this remains on the differential.  This should be followed by primary care.      # HTN, CAD s/p CABG: PTA ASA, lisinopril, HCTZ. Will continue to hold HCTZ and lisinopril due to dehydration, started on amlodipine 5mg -remained stable with 5 mg amlodipine.      # BPH: Continue Flomax.     Consultations This Hospital Stay   ENDOCRINE DIABETES ADULT IP CONSULT  NEUROLOGY GENERAL ADULT IP CONSULT  DERMATOLOGY IP CONSULT  UROLOGY IP CONSULT    Code Status   DNR / DNI    Time Spent on this  "Encounter   I, Minor Wallace, personally saw the patient today and spent greater than 30 minutes discharging this patient.       Minor Gonsalez MD  Internal Medicine Staff Hospitalist Service  [facility]    ______________________________________________________________________    Physical Exam   Vital Signs: Temp: 97.9  F (36.6  C) Temp src: Oral BP: 101/58 Pulse: 88 Heart Rate: 94 Resp: 18 SpO2: 99 % O2 Device: None (Room air)    Weight: 168 lbs 6.4 oz    General Appearance:   Alert and oriented to person, place, date - partially oriented to situation \"I fell down, but it really wasn't that hard.\"  Respiratory: CTAB, no wheezes   Cardiovascular: s1s2 with no murmur   GI: soft, non tender, bowel sounds noted   Skin: presence of faint, erythematous macular rash on upper back and on lower extremities, with a few areas of flaking and skin cracking.   Neuro: AAOx1, b/l UE and LE-4/5; non focal      Significant Results and Procedures   Results for orders placed or performed during the hospital encounter of 01/08/18   CT Head w/o Contrast    Narrative    CT HEAD W/O CONTRAST 1/8/2018 5:51 AM    Provided History: hemorrhage vs calcification on previous CT;     Comparison: 1/7/2018.    Technique: Using multidetector thin collimation helical acquisition  technique, axial, coronal and sagittal CT images from the skull base  to the vertex were obtained without intravenous contrast.     Findings:    Unchanged subtle diffuse hyperdensity of the left putamen comparison  with the right right putamen. No other area concerning for acute  hemorrhage. No mass effect or midline shift. Moderate diffuse cerebral  volume loss. The ventricles are proportionate to the cerebral sulci.  Extensive confluent areas of decreased density in the cerebral white  matter bilaterally that are consistent with sequela of chronic small  vessel ischemic disease. The basal cisterns are patent. No acute loss  of gray-white matter " differentiation. Extensive calcified plaques  along the vertebral arteries. Small lacunar infarction in the left  caudate head.    The visualized paranasal sinuses are clear. The mastoid air cells are  clear.       Impression    Impression:   1. Redemonstration of subtle asymmetric diffuse hyperdensity of the  left putamen. This is more likely to reflect mineralization. Given the  clinical history of choeroathetosis, an MRI would be helpful to  further investigate.     2. No new changes    3. Advanced parenchymal volume loss.    4. Extensive chronic white matter microvascular ischemic changes.     I have personally reviewed the examination and initial interpretation  and I agree with the findings.    MADHURI FOREMAN MD   MR Brain w/o Contrast    Addendum: 1/10/2018    Asymmetric hyperdensity in the basal ganglia on CT, especially the  putamen, when associated with T1 hyperintensity on MRI has been seen  in patients with prolonged hyperglycemia.    AKSHAT CURTIS MD      Narrative    MR BRAIN W/O CONTRAST 1/10/2018 9:54 AM    History: Eval for asymmetric L putamenal hyperdensity on CT;     Comparison:  Head CT 1/8/2008     Technique: Sagittal T1-weighted and axial fat-saturated T2-weighted,  fat-saturated T2 FLAIR, susceptibility weighted images and  diffusion-weighted images with ADC map of the brain were obtained  without intravenous contrast.    Findings: There is diffuse generalized cerebral/cerebellar volume loss  along with moderate findings of chronic small vessel ischemic disease,  primarily involving the periventricular white matter. No evidence of  acute infarct on diffusion-weighted imaging. 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    Bilateral susceptibility artifacts in the globi pallidi and putamina  and slight asymmetric T1 hyperintensity in the left basal ganglia  area. Correlated with head CT from 2 days ago and phase maps of  susceptibility  weighted images, this is most likely secondary to  asymmetric mineralization of the basal ganglia.    No ventriculomegaly. No hydrocephalus. Patent major flow voids.  Atherosclerotic calcification surrounding the vertebrobasilar  arteries.    Paranasal sinuses are clear. Mild left mastoid effusion. Bilateral  pseudophakia. Otherwise orbital structures are unremarkable.      Impression    Impression:  1. Bilateral mineralization in the basal ganglia, greater on the left.  2. Diffuse generalized cerebral/cerebellar volume loss and  accompanying moderate leukoaraiosis.  3. No new intracranial findings since 1/8/2018    I have personally reviewed the examination and initial interpretation  and I agree with the findings.    AKSHAT CURTIS MD   CT Chest Abdomen Pelvis w/o Contrast    Narrative    EXAMINATION: CT CHEST ABDOMEN PELVIS W/O CONTRAST, 1/12/2018 10:57 AM    TECHNIQUE:  Helical CT images from the thoracic inlet through the  symphysis pubis were obtained without contrast.    CONTRAST DOSE: No IV contrast was used    COMPARISON: No comparisons available    HISTORY: concern for hypercalcemia and altered mental status with  underlying malignancy;     FINDINGS:    Chest:     Respiratory motion artifact limits fine detail at the lung bases.    CABG.    Heterogeneous appearance of the thyroid. Heart size is within normal  limits. Extensive coronary calcifications/coronary stents. No  pericardial effusion. Scattered atherosclerotic calcifications of the  major cervical arterial origins, thoracic aorta and the abdominal  aorta. The pulmonary artery is not dilated. No thoracic  adenopathy.Small hiatal hernia.    Central tracheobronchial tree is patent.     Series 4, image 48: 4 mm left upper lobe nodule. Series 4, image 63:  tiny pleural-based nodule, but potentially lymphatic.    Small bilateral pleural effusions. No pneumothorax. Atelectasis.    Abdomen and pelvis:  Limited evaluation secondary to lack of IV contrast.  Respiratory  motion artifact, most pronounced in the mid and upper abdomen.    A 2.3 cm simple left hepatic cyst. Otherwise normal appearance of the  liver and gallbladder. Extensive splenic arterial calcifications. The  spleen parenchyma is unremarkable. Negative left adrenal gland.  Indeterminate right adrenal nodule measures 1.5 x 1.5 cm.   Atrophic  pancreas. Numerous prominent renal cortical and renal sinus cysts  involving both kidneys with multiple bilateral fluid density cysts. A  right interpolar lesion is a thin calcified septation. Multiple right  lower pole renal stones measuring up to 2.6 cm in sum (series 8 image  70, series 9 image 70). Non-obstructive 8 mm distal right ureteral  stone (image 500). No left hydronephrosis. Mild distal left  hydroureter. No left ureteral stone. Normal appearance of the  pancreas. The distal left ureter is dilated (series 3, image 476),  measuring up to 1.6 cm. No definite obstructing stone or mass or stone  is seen on this noncontrast exam; however, nodular intravesicular  extrusion/protrusion of the prostate extends towards the left ureteral  orifice.    Irregular lobulated calcified prostate with protruding hypertrophied  median lobe and associated intravesicular nodular protrusion/extrusion  as above. Unremarkable urinary bladder.    Sigmoid diverticula without evidence of diverticulitis. Unremarkable  appendix. No bowel obstruction or abnormal bowel wall thickening.  No  ascites. No pneumatosis, portal venous gas or free air. No  abdominopelvic lymphadenopathy.    Bones and soft tissues:  Intact median sternotomy wires. No suspicious osseous findings. Mild  multilevel degenerative changes of thoracolumbar spine.      Impression    IMPRESSION:   Limited study secondary to lack of IV contrast.   1. Indeterminate right adrenal nodule measures up to 1.5 cm. If  further evaluation is desired, and if the patient can receive  intravenous contrast, consider follow up CT  following an adrenal  nodule protocol.  2. Prostate is enlarged and irregular with median lobe hypertrophy.  Nonspecific, but most commonly secondary to BPH.   3. Multiple nonobstructing right renal stones including numerous right  lower pole stones which when combined measure up to 2.6 cm.  Non-obstructive 8 mm distal right ureteral stone.  4. At least moderate dilatation of the distal left ureter without  definitive obstructing mass or stone. Nodular left prostate and to the  left ureteral orifice and may be causing obstruction; however, CT  urogram and/or ureteroscopy could be considered for additional  evaluation.  5. Small and technically indeterminate 4 mm left upper lobe pulmonary  nodule. Given the absence of a known primary tumor, this nodule is  below the size threshold origin follow-up most the patient is  determined to be high risk in which case follow-up chest CT in 12  months could be considered.    I have personally reviewed the examination and initial interpretation  and I agree with the findings.    NILDA ZHU MD   XR KUB    Narrative    Examination:  XR KUB    Date:  1/14/2018 9:11 AM     Clinical Information: concern for multiple stones as seen in CT  abdomen/pelvis;      Comparison: 1/12/2018 CT abdomen pelvis without contrast    Findings:   Multiple radiodensities project over the right lower renal pole.  Additional 8 mm radiodensity projecting over the distal right ureter,  position does not appear to be change compared to prior study. The  positions of these opacities correspond with the renal and distal  right ureteral stones visualized on 1/12/2018 study. Calcification in  the central prostate. Extensive atherosclerosis of the aortic  branches, particularly splenic artery. Nondistended bowel gas pattern  with a moderate amount of stool throughout the colon. Degenerative  changes of the lumbosacral spine.      Impression    Impression:  Given differences in technique, no significant  change in  numerous renal calculi projecting over the right lower pole, and  distal right ureter since1/12/2018 CT study.    I have personally reviewed the examination and initial interpretation  and I agree with the findings.    DALIA (Emery) MD BRADY       Pending Results   None    Primary Care Physician   Juan Gongora    Discharge Disposition   Discharged to rehabilitation facility  Condition at discharge: Stable    Discharge Orders     General info for SNF   Length of Stay Estimate: Short Term Care: Estimated # of Days <30  Condition at Discharge: Improving  Level of care:skilled   Rehabilitation Potential: Good  Admission H&P remains valid and up-to-date: Yes  Recent Chemotherapy: N/A  Use Nursing Home Standing Orders: Yes     Mantoux instructions   Give two-step Mantoux (PPD) Per Facility Policy Yes     Reason for your hospital stay   You were hospitalized because you were confused, had abnormal arm and leg movements, and fell.  Initially, there was concern that you might have had a stroke or bled into your head - neither of which seemed to have happened.  However, your blood sugar was very high, and that was the cause of your symptoms.  You improved once we got your blood sugar under control.  We started a medication to try and continue good glucose control and simplify your regimen.     Wound care (specify)   Rash care instructions for arms and upper back:  Instructions: Twice daily apply soaked warm wash cloths to affected areas on arms and upper back, covered by a dry towel, and allow to sit for 10 minutes.  Remove wet wash cloths and pat dry area.  Then apply triamcinolone ointment, followed by a generous amount of emollient cream. Additional applications of emollient cream alone to be applied twice more daily.  Rash care instructions for abdomen and legs:   Instructions: Apply lidex ointment first. Then use Dakins solution to make wet wraps and place over top, then cover wet wraps with dry gauze or  towels and leave in place for 2 hours before removal.  Perform BID.  Apply emollient cream to legs and abdomen twice daily, between wraps.     Glucose monitor nursing POCT   Before meals and at bedtime     Activity - Up with assistive device     Follow Up and recommended labs and tests   Follow up with shelter physician.  The following labs/tests are recommended: Please review glucose monitoring and check BMP on Monday 1/22.  As Victoza was just initiated, anticipate that aspart and lantus needs will diminish greatly in the coming week.        On Monday 1/22  Check BMP to confirm stability of renal function  Reduce Lantus to 11 units  Increase Victoza to 1.2 mg daily  Stop Novolog meal coverage  Continue Novolog correction scale until seen in clinic     On Wednesday 1/24  Endocrinology appt with Dr. Barrientos at 1:40 PM, at [facility] ([address] [place] [address] ([phone]- call if need to reschedule)  Arrive 15 minutes early and bring glucose log and medication record from the TCU    Follow up with [facility] in 1-2 months regarding eczema and basal cell carcinoma     DNR/DNI     Physical Therapy Adult Consult   Evaluate and treat as clinically indicated.    Reason:  Deconditioning due to medical illness     Occupational Therapy Adult Consult   Evaluate and treat as clinically indicated.    Reason:  Deconditioning due to medical illness     Fall precautions     Advance Diet as Tolerated   Follow this diet upon discharge: Orders Placed This Encounter     Calorie Counts     Snacks/Supplements Adult: Ensure Plus (Adult); With Meals     Regular Diet Adult       Discharge Medications   Current Discharge Medication List      START taking these medications    Details   acetaminophen (TYLENOL) 325 MG tablet Take 2 tablets (650 mg) by mouth every 6 hours as needed for mild pain or fever  Qty: 100 tablet    Associated Diagnoses: Other eczema      calcium carbonate (TUMS) 500 MG chewable tablet Take 1 tablet  (500 mg) by mouth 3 times daily as needed for heartburn  Qty: 150 tablet    Associated Diagnoses: Uncontrolled type 2 diabetes mellitus with hyperosmolar nonketotic hyperglycemia (H)      liraglutide (VICTOZA) 18 MG/3ML soln Inject 0.6 mg Subcutaneous daily    Associated Diagnoses: Uncontrolled type 2 diabetes mellitus with hyperosmolar nonketotic hyperglycemia (H)      cetirizine (ZYRTEC) 10 MG tablet Take 1 tablet (10 mg) by mouth 2 times daily  Qty: 30 tablet    Associated Diagnoses: Other eczema      sodium hypochlorite (DAKINS) external solution Apply topically 2 times daily    Associated Diagnoses: Other eczema      amLODIPine (NORVASC) 5 MG tablet Take 1 tablet (5 mg) by mouth daily  Qty: 30 tablet    Associated Diagnoses: Coronary artery disease involving autologous artery coronary bypass graft without angina pectoris; Benign essential hypertension      sodium chloride (OCEAN) 0.65 % nasal spray Spray 1 spray into both nostrils every hour as needed for congestion    Associated Diagnoses: Other eczema      emollient (VANICREAM) cream Apply topically 4 times daily    Associated Diagnoses: Other eczema      fluocinonide (LIDEX) 0.05 % ointment Apply topically 2 times daily    Associated Diagnoses: Other eczema      docusate sodium (COLACE) 100 MG capsule Take 2 capsules (200 mg) by mouth 2 times daily  Qty: 60 capsule    Associated Diagnoses: Uncontrolled type 2 diabetes mellitus with hyperosmolar nonketotic hyperglycemia (H)      sennosides (SENOKOT) 8.6 MG tablet Take 1 tablet by mouth 2 times daily as needed for constipation or no stool  Qty: 120 each    Associated Diagnoses: Uncontrolled type 2 diabetes mellitus with hyperosmolar nonketotic hyperglycemia (H)      cholecalciferol 2000 UNITS tablet Take 2,000 Units by mouth daily  Qty: 30 tablet    Associated Diagnoses: Uncontrolled type 2 diabetes mellitus with hyperosmolar nonketotic hyperglycemia (H)      !! insulin aspart (NOVOLOG PEN) 100 UNIT/ML  injection Inject 1-5 Units Subcutaneous At Bedtime Correction Scale - custom DOSING     Do Not give Bedtime Correction Insulin if BG less than 200    -239 give 1 unit.  -279 give 2 units.  -319 give 3 units.  -359 give 4 units.  BG >/=360 give  5 units    Associated Diagnoses: Uncontrolled type 2 diabetes mellitus with hyperosmolar nonketotic hyperglycemia (H)      !! insulin aspart (NOVOLOG PEN) 100 UNIT/ML injection Inject 1-6 Units Subcutaneous 3 times daily (before meals) Correction Scale - custom DOSING     Do Not give Correction Insulin if Pre-Meal BG less than 140     ISF 40  1 per 40 >/= 140.  -179 give 1 unit.  -219 give 2 units.  -259 give 3 units.  -299 give 4 units.  -339 give 5 units.  BG >/= 340 give 6 units.    Associated Diagnoses: Uncontrolled type 2 diabetes mellitus with hyperosmolar nonketotic hyperglycemia (H)      !! insulin aspart (NOVOLOG PEN) 100 UNIT/ML injection With meals : DOSE:  3 units with meals    Associated Diagnoses: Uncontrolled type 2 diabetes mellitus with hyperosmolar nonketotic hyperglycemia (H)       !! - Potential duplicate medications found. Please discuss with provider.      CONTINUE these medications which have CHANGED    Details   aspirin 81 MG chewable tablet Take 1 tablet (81 mg) by mouth daily  Qty: 36 tablet    Associated Diagnoses: Coronary artery disease involving autologous artery coronary bypass graft without angina pectoris      traZODone (DESYREL) 50 MG tablet Take 1 tablet (50 mg) by mouth At Bedtime  Qty: 60 tablet    Associated Diagnoses: Dementia without behavioral disturbance, unspecified dementia type      pravastatin (PRAVACHOL) 20 MG tablet Take 1 tablet (20 mg) by mouth every evening  Qty: 30 tablet    Associated Diagnoses: Coronary artery disease involving autologous artery coronary bypass graft without angina pectoris      lisinopril (PRINIVIL/ZESTRIL) 2.5 MG tablet Take 8 tablets (20 mg) by mouth  daily  Qty: 30 tablet    Associated Diagnoses: Coronary artery disease involving autologous artery coronary bypass graft without angina pectoris      imiquimod (ALDARA) 5 % cream Apply topically three times a week Apply topically to right jaw on Monday, Wednesday and Friday    Associated Diagnoses: Basal cell carcinoma, scalp/neck      mupirocin (BACTROBAN) 2 % ointment Use 2 times a day to any open, cracked areas of skin.  Qty: 22 g, Refills: 1    Associated Diagnoses: Impetigo      tamsulosin (FLOMAX) 0.4 MG capsule Take 1 capsule (0.4 mg) by mouth daily  Qty: 60 capsule    Associated Diagnoses: Benign prostatic hyperplasia with weak urinary stream         STOP taking these medications       HYDROCHLOROTHIAZIDE PO Comments:   Reason for Stopping:             Allergies   No Known Allergies

## 2018-01-17 NOTE — PLAN OF CARE
Problem: Patient Care Overview  Goal: Plan of Care/Patient Progress Review  Outcome: Improving  VSS on RA. Continuous pulse ox on all night, 89-99% on RA. Blood sugar at 0200 was 159. Denies pain or nausesa. Tolerating a regular diet. PIV saline locked. Voiding adequately on demand, incontinent overnight. No BM overnight. Rash to upper and lower extremities improving slightly from last week although some areas have broken open. Able to ambulate with assist x1 and walker. Bed alarm in place all night, patient did not attempt to get out of bed alone.

## 2018-01-17 NOTE — PROGRESS NOTES
CLINICAL NUTRITION SERVICES    3 day calorie count average (1/14-1/16) = 1144 calories, 48 grams (63-66% nutritional needs)  -mainly relying on oral supplements to meet his needs    Interventions:  Continue to encourage oral supplements with meals.  Consider changing oral supplements to Ensure Plus (rather than Glucerna).  With same intake - would increase intake to meeting 75-80% of needs d/t higher calorie/protein content of Ensure Plus.  Cover CHO grams with insulin as prescribed to prevent hyperglycemia.     RD will continue to follow.     Melody Ruiz MS, RD, LD  Pager [phone]

## 2018-01-17 NOTE — PROGRESS NOTES
Prior Authorization Approval    Victoza  Date Initiated: 01/17/2018  Date Completed: 01/17/2018  Prior Auth Type: Clinical     Status: Approved    Effective Date: 01/01/2018 - 01/30/2021    Copay: $40  [place] Filled: No    Insurance: Ramy  Ph: [phone]  ID: [id number]  Case Number: [id number]  Submitted Via: Kiel White  Pharmacy Liaison  Ph: [phone] Page: [phone]

## 2018-01-17 NOTE — PLAN OF CARE
Problem: Patient Care Overview  Goal: Plan of Care/Patient Progress Review  Outcome: No Change  9692-3170: T.max 99.2. OVSS on RA, pt on continuous pulse oximetry when sleeping. Pt denies pain. Pt on regular diet and calorie counts, pt was able to eat a cheeseburger and some fruit for dinner, no complaints of nausea. BS checks AC and HS. BS check before dinner was 198, 2 units of insulin given per order parameters. Pt also given an additional 2 units for carb coverage following dinner. BS check around bedtime was 209, 1 unit of insulin given per order parameters. Pt started on Tradjenta this evening. PIV L Forearm SL. Pt voiding adequate amounts, 525 mL urine output this shift, pt able to call out this shift for the urinal, no episodes of urine incontinence. No BM this shift, pt is passing flatus. Rash on bilateral lower extremities, legs wrapped this shift with Dakins soaked dressing for 2 hours, scheduled creams applied. Pt confused of time, place, and situation this shift, bed alarm and chair alarm utilized. Pt up with assist x1, walker, and gait belt, pt did ambulate around the unit once this shift and sat up in the chair for dinner. Call light in reach. Will continue to monitor and follow plan of care.

## 2018-01-17 NOTE — PROGRESS NOTES
Care Coordinator- Discharge Planning     Admission Date/Time:  1/8/2018  Attending MD:  Minor Gonsalez MD     Data  Chart reviewed, discussed with interdisciplinary team.   Patient was admitted for:   1. Choreiform movement    2. Hyperglycemia         Assessment  Full assessment completed in previous note    Writer, Nicole (7A SW), and Dr. Gonsalez had conference call with pt's daughters Sophy and Graciela to discuss discharge planning. Discussed pt's diabetes regimen and that pt's [facility] cannot accommodate sliding scale which pt is currently on. Per Dr. Gonsalez, endocrine will start new medication (Victoza) which may simplify pt's regimen and in pt may be able to get off sliding scale in a couple of weeks. Pt's current diabetes needs CAN be met at aTCU and the goal would be for pt to be able to go back to [facility]. Sophy and Graciela asked about memory units and if the memory unit within the [facility] could accommodate pt's needs. Writer spoke with ELSY Jalloh at the detention, who stated that the memory unit does not have additional nursing staff available and therefore would not be able to accommodate sliding scale insulin regimen.Sophy and Graciela verbalized understanding of this and were agreeable to more referrals being made for TCU.       Plan  Anticipated Discharge Date:  TBD  Anticipated Discharge Plan:  TCU when facility found      Lila Luque RN

## 2018-01-17 NOTE — PROGRESS NOTES
SPIRITUAL HEALTH SERVICES  SPIRITUAL ASSESSMENT Progress Note  [facility] ([place] 7A     REFERRAL SOURCE: Chart review    Pt shared that he has difficulties hearing. I provided pt with introduction to [facility].      PLAN: I will visit Ba 1X/ week as long as he remains on 7A.     Felisha Malone   Intern  Pager [phone]

## 2018-01-17 NOTE — PLAN OF CARE
Problem: Patient Care Overview  Goal: Plan of Care/Patient Progress Review  PT - per plan established by the Physical Therapist, according to functional mobility the  discharge recommendation is TCU for cont skilled PT to progress functional IND. Pt is up in chair at start of PT session. Pt is SBA for sit to stand to WW for standing stability. Pt amb up to 500' with WW needing SBA. Pt demo gait and balance drills with SBA using good safe tech with WW. Pt demo up and down 4 steps x 2 with one rial needing SBA. Pt SATs on RA during PT 94%. Pt demo seated and standing there x  Program x 10.   Discharge Planner PT   Patient plan for discharge: unknown  Current status: see above.   Barriers to return to prior living situation: weakness, fatigue.  Recommendations for discharge: TCU   Rationale for recommendations: skilled PT to progress functional IND       Entered by: Jovon Mckeon 01/17/2018 10:19 AM

## 2018-01-17 NOTE — PROGRESS NOTES
"                     Diabetes Consult Daily  Progress Note          Assessment/Plan:                          Christopher Cuevas is a 89 year old male with recent diagnosis of type 2 diabetes, history of dementia, hypertension, CAD s/p CABG,  GERD, and a recent fall who was admitted to the MICU (1/7/2018) from OSH with concern for ICH. CT images suggestive of calcification vs ICH vs related to hyperglycemia.        Improvement in glucose stability since last evening.      Plan  - continue glargine 17 units daily am for today  - linagliptin 5 mg daily given today  - aspart prandial insulin decreased from 1 unit per 10 grams carb to 1 per 12, give promptly at end of eating  - aspart 1 unit per 40 > 140 before meals and > 200 HS  - monitor glucose before meals, HS and 0200  - having good glucose control is important, but due to age and underlying dementia, target 125-250 likely appropriate    - Since PA received for Victoza, plan to start that tomorrow, with d/c of linagliptin and reduction in insulin.  Depending on glucose response tomorrow, will be able to make recommendations for continued monitoring in hospital versus safe for Victoza titration at TCU/AL.  Expectation is that use of Victoza will eliminate need for aspart and hopefully glargine, too.     Will continue to follow   Discussed with pt, RN, paged to primary team.                               Interval History:   The last 24 hours progress and nursing notes reviewed.  ANISHA on CKD: Cr on admission 3.30, Cr stable today 1.38  Glucose improved by evening yesterday, following increase in aspart carb coverage.  Received first dose Tradjenta at 1700.  Fasting Bg better this morning, too.  Pt reports he slept and had a good breakfast.  Asked him to report if he has any stomach upset.  [place] counts report average just over 1000 per day and rec Ensure Plus instead of Glucerna      Historical  PTA pt had been recently \"diagnosed\" with diabetes and started on glargine.  " "Dghtr explained pt had been given label of \"pre-diabetes\" in the past.  Also reported he eats tons of sweets.   Note A1C abnormal in summer 2016-  Appears was checked when pt hospitalized at [facility].      There is question of what level of assistance patient needs to be safe on discharge: TCU versus back to his AL (wife lives there) potentially with increased services.    Multiple daily injections not realistic for patient after discharge (neither is it going well inpatient).  Renal function and desire to avoid hypoglycemia limit options for non-insulin therapies.  Victoza required a prior authorization.  Pharmacy liaison processed and quick response for approval, 40.00 copay.  Tradjenta is a 40.00 copay.      Recent Labs  Lab 01/17/18  0804 01/17/18  0801 01/17/18  0136 01/16/18  2117 01/16/18  1704 01/16/18  1400 01/16/18  1138 01/16/18  0646  01/15/18  0804  01/14/18  0714  01/13/18  0730  01/12/18  0705   *  --   --   --   --   --   --  180*  --  179*  --  155*  --  149*  --  102*   BGM  --  158* 159* 209* 198* 240* 464*  --   < >  --   < >  --   < >  --   < >  --    < > = values in this interval not displayed.            Review of Systems:   See interval hx          Medications:       Active Diet Order      Regular Diet Adult     Physical Exam:  Gen: NAD , up in chair  HEENT:  mucous membranes are moist, hearing impaired (wearing aid)  Resp: Unlabored  Neuro: alert, answering appropriately, not recalling yesterday's visit  /82 (BP Location: Left arm)  Pulse 98  Temp 97.6  F (36.4  C) (Oral)  Resp 16  Ht 1.727 m (5' 8\")  Wt 76.4 kg (168 lb 6.4 oz)  SpO2 97%  BMI 25.61 kg/m2           Data:     Lab Results   Component Value Date    A1C 14.1 01/08/2018                Recent Labs   Lab Test  01/17/18   0804  01/16/18   0646   NA  141  142   POTASSIUM  4.3  4.2   CHLORIDE  110*  112*   CO2  22  22   ANIONGAP  8  8   GLC  159*  180*   BUN  13  15   CR  1.38*  1.34*   LIZ  8.9  8.1*     CBC " RESULTS:   Recent Labs   Lab Test  01/17/18   0755   WBC  3.6*   RBC  3.54*   HGB  11.3*   HCT  33.6*   MCV  95   MCH  31.9   MCHC  33.6   RDW  13.7   PLT  94*       Melody Lambert APRN [facility] [phone]    Diabetes Management job code [id number]

## 2018-01-18 ENCOUNTER — AMBULATORY - HEALTHEAST (OUTPATIENT)
Dept: ADMINISTRATIVE | Facility: CLINIC | Age: 83
End: 2018-01-18

## 2018-01-18 ENCOUNTER — APPOINTMENT (OUTPATIENT)
Dept: PHYSICAL THERAPY | Facility: CLINIC | Age: 83
DRG: 637 | End: 2018-01-18
Attending: PSYCHIATRY & NEUROLOGY
Payer: COMMERCIAL

## 2018-01-18 ENCOUNTER — AMBULATORY - HEALTHEAST (OUTPATIENT)
Dept: GERIATRICS | Facility: CLINIC | Age: 83
End: 2018-01-18

## 2018-01-18 VITALS
OXYGEN SATURATION: 100 % | BODY MASS INDEX: 25.33 KG/M2 | HEIGHT: 68 IN | DIASTOLIC BLOOD PRESSURE: 69 MMHG | HEART RATE: 79 BPM | SYSTOLIC BLOOD PRESSURE: 108 MMHG | WEIGHT: 167.1 LBS | TEMPERATURE: 97.5 F | RESPIRATION RATE: 16 BRPM

## 2018-01-18 PROBLEM — E11.00 UNCONTROLLED TYPE 2 DIABETES MELLITUS WITH HYPEROSMOLAR NONKETOTIC HYPERGLYCEMIA (H): Status: ACTIVE | Noted: 2018-01-18

## 2018-01-18 LAB
GLUCOSE BLDC GLUCOMTR-MCNC: 169 MG/DL (ref 70–99)
GLUCOSE BLDC GLUCOMTR-MCNC: 180 MG/DL (ref 70–99)
GLUCOSE BLDC GLUCOMTR-MCNC: 276 MG/DL (ref 70–99)

## 2018-01-18 PROCEDURE — 25000125 ZZHC RX 250: Performed by: CLINICAL NURSE SPECIALIST

## 2018-01-18 PROCEDURE — 00000146 ZZHCL STATISTIC GLUCOSE BY METER IP

## 2018-01-18 PROCEDURE — 99239 HOSP IP/OBS DSCHRG MGMT >30: CPT | Performed by: INTERNAL MEDICINE

## 2018-01-18 PROCEDURE — 97530 THERAPEUTIC ACTIVITIES: CPT | Mod: GP | Performed by: PHYSICAL THERAPIST

## 2018-01-18 PROCEDURE — 25000132 ZZH RX MED GY IP 250 OP 250 PS 637: Performed by: PHYSICIAN ASSISTANT

## 2018-01-18 PROCEDURE — 25000132 ZZH RX MED GY IP 250 OP 250 PS 637: Performed by: INTERNAL MEDICINE

## 2018-01-18 PROCEDURE — G0463 HOSPITAL OUTPT CLINIC VISIT: HCPCS

## 2018-01-18 PROCEDURE — 40000193 ZZH STATISTIC PT WARD VISIT: Performed by: PHYSICAL THERAPIST

## 2018-01-18 RX ORDER — ACETAMINOPHEN 325 MG/1
650 TABLET ORAL EVERY 6 HOURS PRN
Qty: 100 TABLET | DISCHARGE
Start: 2018-01-18

## 2018-01-18 RX ORDER — CALCIUM CARBONATE 500 MG/1
1 TABLET, CHEWABLE ORAL 3 TIMES DAILY PRN
Qty: 150 TABLET | DISCHARGE
Start: 2018-01-18

## 2018-01-18 RX ORDER — LISINOPRIL 2.5 MG/1
20 TABLET ORAL DAILY
Qty: 30 TABLET | DISCHARGE
Start: 2018-01-18

## 2018-01-18 RX ORDER — TAMSULOSIN HYDROCHLORIDE 0.4 MG/1
0.4 CAPSULE ORAL DAILY
Qty: 60 CAPSULE | DISCHARGE
Start: 2018-01-18

## 2018-01-18 RX ORDER — FLUOCINONIDE 0.5 MG/G
OINTMENT TOPICAL 2 TIMES DAILY
DISCHARGE
Start: 2018-01-18

## 2018-01-18 RX ORDER — CETIRIZINE HYDROCHLORIDE 10 MG/1
10 TABLET ORAL 2 TIMES DAILY
Qty: 30 TABLET | DISCHARGE
Start: 2018-01-18

## 2018-01-18 RX ORDER — AMLODIPINE BESYLATE 5 MG/1
5 TABLET ORAL DAILY
Qty: 30 TABLET | DISCHARGE
Start: 2018-01-19

## 2018-01-18 RX ORDER — EMOLLIENT BASE
CREAM (GRAM) TOPICAL 4 TIMES DAILY
DISCHARGE
Start: 2018-01-18

## 2018-01-18 RX ORDER — DOCUSATE SODIUM 100 MG/1
200 CAPSULE, LIQUID FILLED ORAL 2 TIMES DAILY
Qty: 60 CAPSULE | DISCHARGE
Start: 2018-01-18

## 2018-01-18 RX ORDER — LIRAGLUTIDE 6 MG/ML
0.6 INJECTION SUBCUTANEOUS DAILY
DISCHARGE
Start: 2018-01-19

## 2018-01-18 RX ORDER — ASPIRIN 81 MG/1
81 TABLET, CHEWABLE ORAL DAILY
Qty: 36 TABLET | DISCHARGE
Start: 2018-01-18

## 2018-01-18 RX ORDER — MUPIROCIN 20 MG/G
OINTMENT TOPICAL
Qty: 22 G | Refills: 1 | DISCHARGE
Start: 2018-01-18

## 2018-01-18 RX ORDER — PRAVASTATIN SODIUM 20 MG
20 TABLET ORAL EVERY EVENING
Qty: 30 TABLET | DISCHARGE
Start: 2018-01-18

## 2018-01-18 RX ORDER — TRAZODONE HYDROCHLORIDE 50 MG/1
50 TABLET, FILM COATED ORAL AT BEDTIME
Qty: 60 TABLET | DISCHARGE
Start: 2018-01-18

## 2018-01-18 RX ORDER — IMIQUIMOD 12.5 MG/.25G
CREAM TOPICAL
DISCHARGE
Start: 2018-01-19

## 2018-01-18 RX ORDER — SENNOSIDES 8.6 MG
1 TABLET ORAL 2 TIMES DAILY PRN
Qty: 120 EACH | DISCHARGE
Start: 2018-01-18

## 2018-01-18 RX ADMIN — ASPIRIN 81 MG CHEWABLE TABLET 81 MG: 81 TABLET CHEWABLE at 09:26

## 2018-01-18 RX ADMIN — DOCUSATE SODIUM 200 MG: 100 CAPSULE, LIQUID FILLED ORAL at 09:26

## 2018-01-18 RX ADMIN — MUPIROCIN: 2 CREAM TOPICAL at 09:29

## 2018-01-18 RX ADMIN — Medication: at 09:29

## 2018-01-18 RX ADMIN — Medication: at 13:38

## 2018-01-18 RX ADMIN — TAMSULOSIN HYDROCHLORIDE 0.4 MG: 0.4 CAPSULE ORAL at 09:27

## 2018-01-18 RX ADMIN — VITAMIN D, TAB 1000IU (100/BT) 2000 UNITS: 25 TAB at 09:27

## 2018-01-18 RX ADMIN — FLUOCINONIDE: 0.5 OINTMENT TOPICAL at 09:29

## 2018-01-18 RX ADMIN — CETIRIZINE HYDROCHLORIDE 10 MG: 10 TABLET, FILM COATED ORAL at 09:26

## 2018-01-18 RX ADMIN — MULTIPLE VITAMINS W/ MINERALS TAB 1 TABLET: TAB at 09:26

## 2018-01-18 RX ADMIN — LIRAGLUTIDE 0.6 MG: 6 INJECTION SUBCUTANEOUS at 08:38

## 2018-01-18 RX ADMIN — AMLODIPINE BESYLATE 5 MG: 5 TABLET ORAL at 09:27

## 2018-01-18 NOTE — PLAN OF CARE
"Problem: Patient Care Overview  Goal: Plan of Care/Patient Progress Review  Outcome: Improving  /69 (BP Location: Left arm)  Pulse 79  Temp 97.5  F (36.4  C) (Oral)  Resp 16  Ht 1.727 m (5' 8\")  Wt 75.8 kg (167 lb 1.6 oz)  SpO2 100%  BMI 25.41 kg/m2     7831-5698: AVSS on RA. Patient denies pain and nausea. Voiding, occasionally incontinent. Formed BM x1. Regular diet with fair appetite. , 267 corrected per CC and SSI orders. Received triceba and lantus as well. WOC consult ordered for R ankle wound. Paged WOC RN, who stated she was unsure she would be able to see patient prior to DC. Mepilex applied for transfer. PIV removed prior to DC. Up with 1 assist, walker/gaitbelt. Walked unit with PT x1. Plan to DC to [facility] TCU at 1530. Will call report to facility. Daughter, Graciela, will provide a ride.          "

## 2018-01-18 NOTE — PROGRESS NOTES
Social Work Services Discharge Note      Patient Name:  Christopher Cuevas     Anticipated Discharge Date:  1/18/2018    Discharge Disposition:   [facility]  [address] [place] [address]  P: [phone].    Following MD:  Dr. Gonsalez     Pre-Admission Screening (PAS) online form has been completed.  The Level of Care (LOC) is:  Determined  Confirmation Code is: [id number]   Patient/caregiver informed of referral to [facility] for Pre-Admission Screening for skilled nursing facility (SNF) placement and to expect a phone call post discharge from SNF.     Additional Services/Equipment Arranged:  No ride needed, pts daughter to transport.     Patient / Family response to discharge plan:  In agreement.      Persons notified of above discharge plan:  Pt, daughters, medical team.    Staff Discharge Instructions:  Please fax discharge orders and signed hard scripts for any controlled substances.  Please print a packet and send with patient.     CTS Handoff completed:  NO    Medicare Notice of Rights provided to the patient/family:  NO    JANICE Bolton, UMANG  7A   Ph: [phone], Pager: [phone]

## 2018-01-18 NOTE — PLAN OF CARE
Problem: Patient Care Overview  Goal: Plan of Care/Patient Progress Review  PT 7A    Discharge Planner PT   Patient plan for discharge: not discussed with patient  Current status: sit <> stand with SBA.  Ambulated 250' with FWW and SBA.  Ambulated 125' without AD and min assist.  No LOB noted during gait activities.   Barriers to return to prior living situation: medical status  Recommendations for discharge: from mobility standpoint, patient appropriate for discharge back to [facility] with supervision for safety awareness. Will defer to OT for additional discharge recommendations.   Rationale for recommendations: Currently SBA for all gait activities without LOB.        Entered by: Esteban Lucas 01/18/2018 12:26 PM

## 2018-01-18 NOTE — PLAN OF CARE
Problem: Patient Care Overview  Goal: Plan of Care/Patient Progress Review  Outcome: No Change  5164-4326: Afebrile. Pt slightly hypertensive at the beginning of the shift (155/69). BP rechecked before administering PRN medications, BP recheck 154/81, PRN Hydralazine 10 mg given x1, BP recheck 101/58. OVSS on RA, pt on continuous pulse oximetry when sleeping. Pt denies pain. Pt on regular diet, pt was able to eat about 75% of his dinner, no complaints of nausea. BS check AC and HS. BS check before dinner was 177, 1 unit of insulin given per order parameters. Pt receive 10 units of insulin for carb coverage. BS check around bedtime was 253, 2 units of insulin given per order parameters. PIV L Forearm SL. Pt voiding adequate amounts, pt was able to use the urinal once this shift and had one episode of urine incontinence. Pt did have a smear BM this shift, pt is also passing flatus. Lower extremities wrapped this shift per wound care orders. Scheduled creams applied. Pt remains confused of time and situation, bed alarm and chair alarm utilized this shift, pt never set off alarms. Pt up with assist x1, walker, and gait belt, pt ambulated around the unit once this shift. Call light in reach. Will continue to monitor and follow plan of care.

## 2018-01-18 NOTE — PLAN OF CARE
Pt left unit around 1545. Pt left unit via wheelchair with the assistance of his Daughter, Graciela. All paperwork and medications given to the patient prior to leaving the unit. Writer called TCU to make sure everything was clear with the patients discharge orders and the nurse did not have any further questions.

## 2018-01-18 NOTE — DISCHARGE INSTRUCTIONS
Plan of care for wound located on Right lateral malleolus :   Cleanse the area with NS and pat dry.  Cover wound with Mepilex.   Change dressing Q 3 days.  Keep the pressure off from the area.  Pt will benefit from vascular consult as an outpatient if current wound fails to heal.      For discharge:  Lantus 14 units daily    Novolog 3 units with meals     Novolog correction scale:  Before meals  -179 give 1 unit.   -219 give 2 units.   -259 give 3 units.   -299 give 4 units.   -339 give 5 units.   BG >/= 340 give 6 units.   At bedtime  -239 give 1 unit.   -279 give 2 units.   -319 give 3 units.   -359 give 4 units.   BG >/=360 give  5 units     Victoza 0.6 mg daily          Check glucose before meals, Bedtime and PRN symptoms low glucose  Monitor for side effects Victoza, like nausea.    On Monday 1/22  Check BMP to confirm stability of renal function  Reduce Lantus to 11 units  Increase Victoza to 1.2 mg daily  Stop Novolog carbohydrate coverage  Continue Novolog correction scale until seen in clinic    On Wednesday 1/24  Endocrinology appt with Dr. Barrientos at 1:40 PM, at [facility] ([address] [place] [address]).  If any problem with appt, please call clinic to reschedule [phone]  Arrive 15 minutes early and bring glucose log and medication record from the TCU   prescription called to pharmacy

## 2018-01-18 NOTE — PLAN OF CARE
"Problem: Patient Care Overview  Goal: Plan of Care/Patient Progress Review  Outcome: No Change  /66 (BP Location: Right arm)  Pulse 79  Temp 98.4  F (36.9  C) (Oral)  Resp 16  Ht 1.727 m (5' 8\")  Wt 76.4 kg (168 lb 6.4 oz)  SpO2 99%  BMI 25.61 kg/m2    Disoriented to time and place. VSS on RA. Regular diet. Up with assist x1 + gait belt + walker. Denies pain and nausea. BG overnight was 180. L PIV SL. Skin rash/abrasions open to air. Urine output 200mL + incontinent episode x1. No BM. Pt slept comfortably throughout the night. Will continue to monitor and notify the team of any changes.       "

## 2018-01-18 NOTE — PROGRESS NOTES
[facility] Nurse Inpatient Wound Assessment     Initial Assessment of wound(s) on pt's:   Right outer malleolus        Data:   Patient History:      per MD note(s): Christopher Cuevas is a 89 year old male with a PMH of dementia, HTN, CAD s/p CABG, BPH and GERD who was transferred to [facility] on 2018 for confusion and concern for intracranial hemorrhage.  ICH was ruled out by imaging, and the leading differential diagnosis for his confusion is uncontrolled hyperglycemia secondary to recently diagnosed Type 2 DM.        Moisture Management:  NA    Current Diet / Nutrition:           Active Diet Order      Regular Diet Adult      Advance Diet as Tolerated               Donavan Assessment and sub scores:   Donavan Score  Av.3  Min: 15  Max: 18     Labs:         Recent Labs   Lab Test  18   0755   18   0643  18   0934   ALBUMIN   --    --   3.0*   --    HGB  11.3*   < >  11.5*  13.1*   RBC  3.54*   < >  3.65*  4.12*   WBC  3.6*   < >  5.9  7.8   PLT  94*   < >  102*  111*   A1C   --    --    --   14.1*   CRP   --    --   8.3*   --     < > = values in this interval not displayed.          Wound Assessment (location #1):   Right outer malleolus  Wound History:  Pt has Dementia, do not remember how he received this wound. He has been independently turning and able to ambulate. Has hx of CAD, nature of the wound appears to be arterial insufficiency rather than pressure injury. Pedal pulse positive.                 Pic taken- 18- Right lateral malleolus       Wound Base:100% dry superificial fibrinous tissue    Specific Dimensions (length x width x depth, in cm) :   1.5x0.6x0.0cm with irregular boarder    Tunneling:  N/A    Undermining: N/A    Palpation of the wound bed:  firm    Slough appearance:  none    Eschar appearance:  none    Periwound Skin: intact and blanchable erythema that extends approximately 2-3 cm,      Color: red    Temperature  normal     Drainage:  none.    Odor:  none    Pain:  absent ,           Intervention:     Patient's chart evaluated.      Wound(s) was assessed    Wound Care: was done:  Per POC mentioned below    Orders  Written    Supplies  at bedside    Discussed plan of care with Patient and Nurse          Assessment:            Right lateral malleolus- Most consistent with Arterial  Ulcer, than pressure injury.        Plan:     Nursing to notify the Provider(s) and re-consult the WOC Nurse if wound(s) deteriorate(s).  Plan of care for wound located on Right lateral malleolus :   Cleanse the area with NS and pat dry.  Cover wound with Mepilex.   Change dressing Q 3 days.  Keep the pressure off from the area.  Pt will benefit from vascular consult as an outpatient if current wound fails to heal.    WOC Nurse will return: Pt discharging today

## 2018-01-18 NOTE — PLAN OF CARE
Problem: Patient Care Overview  Goal: Plan of Care/Patient Progress Review  OT 7A:  Cx. X 3 attempts. Pt. with another caregiver;PT upon initial attempt.  Pt. Having lunch upon 2nd attempt and pt. with nursing prep for d/c to rehab/TCU upon 3rd attempt.    Occupational Therapy Discharge Summary    Reason for therapy discharge:    Discharged to transitional care facility.    Progress towards therapy goal(s). See goals on Care Plan in [facility] electronic health record for goal details.  Pt. not seen by d/cing therapist. per chart pt. Fatimah/SBA with BADLs and functional mobility, ambultating with FWW.    Therapy recommendation(s):    Continued therapy is recommended.  Rationale/Recommendations:  continue with OT in rehab setting to increase activity tolerance/strength and max. safety/indep. with ADls and mobility prior to d/c home/AL..

## 2018-01-18 NOTE — PROGRESS NOTES
Diabetes Consult Daily  Progress Note          Assessment/Plan:                          Christopher Cuevas is a 89 year old male with recent diagnosis of type 2 diabetes, history of dementia, hypertension, CAD s/p CABG,  GERD, and a recent fall who was admitted to the MICU (1/7/2018) from OSH with concern for ICH. CT images suggestive of calcification vs ICH vs related to hyperglycemia.        Glucose stability improving with addition of DPP-4, but have approval to switch to Victoza which is more likely to sufficiently meet patient's needs.      Plan  - linagliptin discontinued and start liraglutide 0.6 mg today  Total daily insulin decreased 20% w/ lirag start  - glargine decreased to 14 units today  - aspart prandial insulin decreased from 1 unit per 12 grams carb to 1 per 15, give promptly at end of eating  - aspart 1 unit per 40 > 140 before meals and > 200 HS  - monitor glucose before meals, HS and 0200  - having good glucose control is important, but due to age and underlying dementia, target 125-250 likely appropriate    Anticipate elimination of need for aspart within the next 2-6 days, depending on liraglutide tolerance/glucose response.  Anticipate glargine reduction and hopefully elimination with liraglutide dose advancement.     Discussed with pt, RN, primary team                For discharge:  Lantus 14 units daily  Novolog 1 unit per 15 grams carbohydrate with meals and snacks--->  Replace with 3 units per meal, since TCU won't carb count  Novolog correction scale:  Before meals  -179 give 1 unit.   -219 give 2 units.   -259 give 3 units.   -299 give 4 units.   -339 give 5 units.   BG >/= 340 give 6 units.   At bedtime  -239 give 1 unit.   -279 give 2 units.   -319 give 3 units.   -359 give 4 units.   BG >/=360 give  5 units     Victoza 0.6 mg daily          Check glucose before meals, Bedtime and PRN symptoms low glucose  Monitor  "for side effects Victoza, like nausea.    On Monday 1/22  Check BMP to confirm stability of renal function  Reduce Lantus to 11 units  Increase Victoza to 1.2 mg daily  Stop Novolog carbohydrate coverage  Continue Novolog correction scale until seen in clinic    On Wednesday 1/24  Endocrinology appt with Dr. Barrientos at 1:40 PM, at [facility] ([address] [place] [address]) ([phone]- call if need to reschedule)  Arrive 15 minutes early and bring glucose log and medication record from the TCU                 Interval History:   The last 24 hours progress and nursing notes reviewed.  ANISHA on CKD: Cr on admission 3.30, Cr stable today previous two days in 1.3s    [facility] counts report average just over 1000 per day and rec Ensure Plus instead of Glucerna.  Started Ensure Plus today and drank more than expected (so more than covered w/ aspart),  afterward.  Kirit says he has no belly pain or nausea.  He is very upset he cannot go straight home.  Doesn't want to get to know a new place.  He's coming to awareness of the diabetes diagnosis.      Historical  PTA pt had been recently \"diagnosed\" with diabetes and started on glargine.  Dghtr explained pt had been given label of \"pre-diabetes\" in the past.  Also reported he eats tons of sweets.   Note A1C abnormal in summer 2016-  Appears was checked when pt hospitalized at [facility].      There is question of what level of assistance patient needs to be safe on discharge: TCU versus back to his AL (wife lives there) potentially with increased services.    Multiple daily injections not realistic for patient after discharge (neither is it going well inpatient).  Renal function and desire to avoid hypoglycemia limit options for non-insulin therapies.  Victoza required a prior authorization.  Pharmacy liaison processed and quick response for approval, 40.00 copay.  Tradjenta is a 40.00 copay.      Recent Labs  Lab 01/18/18  0840 01/18/18  0232 01/17/18  2135 " "01/17/18  1735 01/17/18  1202 01/17/18  0804 01/17/18  0801  01/16/18  0646  01/15/18  0804  01/14/18  0714  01/13/18  0730  01/12/18  0705   GLC  --   --   --   --   --  159*  --   --  180*  --  179*  --  155*  --  149*  --  102*   * 180* 253* 177* 224*  --  158*  < >  --   < >  --   < >  --   < >  --   < >  --    < > = values in this interval not displayed.            Review of Systems:   See interval hx          Medications:       Active Diet Order      Regular Diet Adult     Physical Exam:  Gen: NAD , up walking with PT, then resting in chair on second visit  HEENT:  mucous membranes are moist, hearing impaired (wearing aid)  Resp: Unlabored  Neuro: alert, following directions, clearing expressing his needs  /68 (BP Location: Left arm)  Pulse 79  Temp 98.5  F (36.9  C) (Oral)  Resp 18  Ht 1.727 m (5' 8\")  Wt 75.8 kg (167 lb 1.6 oz)  SpO2 99%  BMI 25.41 kg/m2           Data:     Lab Results   Component Value Date    A1C 14.1 01/08/2018              GFR Estimate   Date Value Ref Range Status   01/17/2018 48 (L) >60 mL/min/1.7m2 Final     Comment:     Non  GFR Calc   01/16/2018 50 (L) >60 mL/min/1.7m2 Final     Comment:     Non  GFR Calc   01/15/2018 45 (L) >60 mL/min/1.7m2 Final     Comment:     Non  GFR Calc     GFR Estimate If Black   Date Value Ref Range Status   01/17/2018 59 (L) >60 mL/min/1.7m2 Final     Comment:      GFR Calc   01/16/2018 61 >60 mL/min/1.7m2 Final     Comment:      GFR Calc   01/15/2018 55 (L) >60 mL/min/1.7m2 Final     Comment:      GFR Calc         Recent Labs   Lab Test  01/17/18   0804  01/16/18   0646   NA  141  142   POTASSIUM  4.3  4.2   CHLORIDE  110*  112*   CO2  22  22   ANIONGAP  8  8   GLC  159*  180*   BUN  13  15   CR  1.38*  1.34*   LIZ  8.9  8.1*     CBC RESULTS:   Recent Labs   Lab Test  01/17/18   0755   WBC  3.6*   RBC  3.54*   HGB  11.3*   HCT  33.6*   MCV  " 95   MCH  31.9   MCHC  33.6   RDW  13.7   PLT  94*     I spent a total of 35 minutes bedside and on the inpatient unit managing the glycemic care of Christopher Cuevas. Over 50% of my time on the unit was spent counseling the patient and/or coordinating care regarding dicharge plan, monitoring, follow up.  See note for details.    Melody Fausto APRN -[id number]    Diabetes Management job code [id number]

## 2018-01-19 ENCOUNTER — CARE COORDINATION (OUTPATIENT)
Dept: CARE COORDINATION | Facility: CLINIC | Age: 83
End: 2018-01-19

## 2018-01-19 NOTE — PROGRESS NOTES
Patient has clinic visit within 24-48 hours of Discharge so no post DC follow up call is needed    Follow up with [facility] physician.  The following labs/tests are recommended: Please review glucose monitoring and check BMP on Monday 1/22.  As Victoza was just initiated, anticipate that aspart and lantus needs will diminish greatly in the coming week.        On Monday 1/22  Check BMP to confirm stability of renal function  Reduce Lantus to 11 units  Increase Victoza to 1.2 mg daily  Stop Novolog carbohydrate coverage  Continue Novolog correction scale until seen in clinic

## 2018-01-19 NOTE — PLAN OF CARE
Problem: Patient Care Overview  Goal: Plan of Care/Patient Progress Review  Physical Therapy Discharge Summary    Reason for therapy discharge:    Discharged to transitional care facility.    Progress towards therapy goal(s). See goals on Care Plan in [facility] electronic health record for goal details.  Goals partially met.  Barriers to achieving goals:   discharge from facility.    Therapy recommendation(s):    No further therapy is recommended.  Patient discharged to TCU for medication reasons.

## 2018-01-22 ENCOUNTER — OFFICE VISIT - HEALTHEAST (OUTPATIENT)
Dept: GERIATRICS | Facility: CLINIC | Age: 83
End: 2018-01-22

## 2018-01-22 ENCOUNTER — RECORDS - HEALTHEAST (OUTPATIENT)
Dept: LAB | Facility: CLINIC | Age: 83
End: 2018-01-22

## 2018-01-22 DIAGNOSIS — N18.30 STAGE 3 CHRONIC KIDNEY DISEASE (H): ICD-10-CM

## 2018-01-22 DIAGNOSIS — I10 ESSENTIAL HYPERTENSION: ICD-10-CM

## 2018-01-22 DIAGNOSIS — N40.0 BPH (BENIGN PROSTATIC HYPERPLASIA): ICD-10-CM

## 2018-01-22 DIAGNOSIS — I25.10 CAD (CORONARY ARTERY DISEASE): ICD-10-CM

## 2018-01-22 DIAGNOSIS — R29.898 UPPER EXTREMITY WEAKNESS: ICD-10-CM

## 2018-01-22 DIAGNOSIS — E11.9 DIABETES MELLITUS, TYPE 2 (H): ICD-10-CM

## 2018-01-22 LAB
ANION GAP SERPL CALCULATED.3IONS-SCNC: 7 MMOL/L (ref 5–18)
BUN SERPL-MCNC: 14 MG/DL (ref 8–28)
CALCIUM SERPL-MCNC: 8.6 MG/DL (ref 8.5–10.5)
CHLORIDE BLD-SCNC: 108 MMOL/L (ref 98–107)
CO2 SERPL-SCNC: 22 MMOL/L (ref 22–31)
CREAT SERPL-MCNC: 1.46 MG/DL (ref 0.7–1.3)
GFR SERPL CREATININE-BSD FRML MDRD: 45 ML/MIN/1.73M2
GLUCOSE BLD-MCNC: 104 MG/DL (ref 70–125)
POTASSIUM BLD-SCNC: 3.8 MMOL/L (ref 3.5–5)
SODIUM SERPL-SCNC: 137 MMOL/L (ref 136–145)

## 2018-01-23 ENCOUNTER — TELEPHONE (OUTPATIENT)
Dept: ENDOCRINOLOGY | Facility: CLINIC | Age: 83
End: 2018-01-23

## 2018-01-23 NOTE — TELEPHONE ENCOUNTER
His daughter called and cancelled  tomorrows appointment here in Endocrine. She will angel back to schedule when she figures out transportation.

## 2018-01-23 NOTE — TELEPHONE ENCOUNTER
----- Message from SE Calderon sent at 1/23/2018 11:09 AM CST -----  Regarding: RE: call daughter   He needed follow up so he could be assessed/transitioned off insulin.  His daughter agreed to follow up at the  when he was in hospital.  One of his daughters drove him from the  to [facility].  So, I don't know why they can't transport now.  The rub is that if he doesn't get safely onto a simply diabetes plan, he can't go back to his memory care assisted living (where is wife lives).  It is up to the family about when they can get him in to be seen and where they are willing to go.  I suppose it's possible the provider at [facility] will do it for them.    Melody  ----- Message -----     From: Veronique Powell RN     Sent: 1/22/2018   3:25 PM       To: SE Calderon  Subject: FW: call daughter                                So he doesn't need to be seen by us ? Or he does but only if his daughter can arrange  Transportation?   ----- Message -----     From: Melody Lambert APRN CNS     Sent: 1/22/2018   3:22 PM       To: Veronique Powell RN, #  Subject: RE: call daughter                                The plan last week was for him to go to [facility] TCU, so not in our system.      Melody  ----- Message -----     From: Veronique Powell RN     Sent: 1/22/2018   1:51 PM       To: SE Calderon, #  Subject: FW: call daughter                                Daughter Sharon calling about his clinic appt Wednesday. He is over in transitional care   scheduled to be seen Wed here. She does not  know how  she would ever get him into clinic  and would probably need  gurney  transport by ambulance . He  doesn't ambulate , has dementia . Will the team see him in transitional  Care?  Daughter thought if a provider went there then he would never need to come here . Generally if  they  havn't been established in clinic with a provider we cannot follow them  . What  can we do to accommodate ?  ----- Message -----     From: Yumi Silva     Sent: 1/22/2018  10:56 AM       To: Veronique Powell RN  Subject: call daughter                                    Wilberto's daughter Sophy wants a call from a nurse. They are scheduled for a hospital f/u this week but it sounds like they want to cancel and think that you or Iliana are going to give his transitional care nurse instructions without him being seen.     Yumi

## 2018-01-24 ENCOUNTER — TELEPHONE (OUTPATIENT)
Dept: ENDOCRINOLOGY | Facility: CLINIC | Age: 83
End: 2018-01-24

## 2018-01-24 NOTE — TELEPHONE ENCOUNTER
We received a message from his daughter yesterday that sophy will  Reschedule  In  Endocrine  when transportation is worked out. He is not in  our facility so needs to be seen in clinic to establish care  for Diabetes. We cannot follow him from  The hospital  He needs to be seen  In clinic. Sophy was  notified of this.

## 2018-01-24 NOTE — TELEPHONE ENCOUNTER
----- Message from Agnes Barrientos MD sent at 1/23/2018  4:50 PM CST -----  Regarding: RE: call daughter   Not sure what I can do. If he cannot make it to me, then reschedule?    ----- Message -----     From: Melody Lambert APRN CNS     Sent: 1/22/2018   3:22 PM       To: Veronique Powell RN, #  Subject: RE: call daughter                                The plan last week was for him to go to [facility], so not in our system.      Melody  ----- Message -----     From: Veronique Powell RN     Sent: 1/22/2018   1:51 PM       To: SE Calderon, #  Subject: FW: call daughter                                Daughter Sharon calling about his clinic appt Wednesday. He is over in transitional care   scheduled to be seen Wed here. She does not  know how  she would ever get him into clinic  and would probably need  gurney  transport by ambulance . He  doesn't ambulate , has dementia . Will the team see him in transitional  Care?  Daughter thought if a provider went there then he would never need to come here . Generally if  they  havn't been established in clinic with a provider we cannot follow them . What  can we do to accommodate ?  ----- Message -----     From: Yumi Silva     Sent: 1/22/2018  10:56 AM       To: Veronique Powell, RN  Subject: call daughter                                    Pt's daughter Sophy wants a call from a nurse. They are scheduled for a hospital f/u this week but it sounds like they want to cancel and think that you or Iliana are going to give his transitional care nurse instructions without him being seen.     Yumi

## 2018-01-25 ENCOUNTER — OFFICE VISIT - HEALTHEAST (OUTPATIENT)
Dept: GERIATRICS | Facility: CLINIC | Age: 83
End: 2018-01-25

## 2018-01-25 DIAGNOSIS — F03.90 DEMENTIA WITHOUT BEHAVIORAL DISTURBANCE, UNSPECIFIED DEMENTIA TYPE: ICD-10-CM

## 2018-01-25 DIAGNOSIS — E11.9 TYPE 2 DIABETES MELLITUS WITHOUT COMPLICATION, UNSPECIFIED LONG TERM INSULIN USE STATUS: ICD-10-CM

## 2018-01-25 DIAGNOSIS — N18.30 STAGE 3 CHRONIC KIDNEY DISEASE (H): ICD-10-CM

## 2018-01-25 DIAGNOSIS — Z95.1 S/P CABG (CORONARY ARTERY BYPASS GRAFT): ICD-10-CM

## 2018-01-25 DIAGNOSIS — I10 ESSENTIAL HYPERTENSION: ICD-10-CM

## 2018-01-26 ENCOUNTER — AMBULATORY - HEALTHEAST (OUTPATIENT)
Dept: GERIATRICS | Facility: CLINIC | Age: 83
End: 2018-01-26

## 2018-01-29 ENCOUNTER — OFFICE VISIT - HEALTHEAST (OUTPATIENT)
Dept: GERIATRICS | Facility: CLINIC | Age: 83
End: 2018-01-29

## 2018-01-29 DIAGNOSIS — E11.9 TYPE 2 DIABETES MELLITUS WITHOUT COMPLICATION, UNSPECIFIED LONG TERM INSULIN USE STATUS: ICD-10-CM

## 2018-01-29 DIAGNOSIS — Z95.1 S/P CABG (CORONARY ARTERY BYPASS GRAFT): ICD-10-CM

## 2018-01-29 DIAGNOSIS — N40.0 BPH (BENIGN PROSTATIC HYPERPLASIA): ICD-10-CM

## 2018-01-29 DIAGNOSIS — I10 ESSENTIAL HYPERTENSION: ICD-10-CM

## 2018-01-29 DIAGNOSIS — R53.1 WEAKNESS: ICD-10-CM

## 2018-01-29 DIAGNOSIS — I25.10 CAD (CORONARY ARTERY DISEASE): ICD-10-CM

## 2018-02-01 ENCOUNTER — OFFICE VISIT - HEALTHEAST (OUTPATIENT)
Dept: GERIATRICS | Facility: CLINIC | Age: 83
End: 2018-02-01

## 2018-02-01 DIAGNOSIS — F03.90 DEMENTIA WITHOUT BEHAVIORAL DISTURBANCE, UNSPECIFIED DEMENTIA TYPE: ICD-10-CM

## 2018-02-01 DIAGNOSIS — N18.30 STAGE 3 CHRONIC KIDNEY DISEASE (H): ICD-10-CM

## 2018-02-01 DIAGNOSIS — E11.9 TYPE 2 DIABETES MELLITUS WITHOUT COMPLICATION, UNSPECIFIED LONG TERM INSULIN USE STATUS: ICD-10-CM

## 2018-02-01 DIAGNOSIS — Z95.1 S/P CABG (CORONARY ARTERY BYPASS GRAFT): ICD-10-CM

## 2018-02-02 ENCOUNTER — RECORDS - HEALTHEAST (OUTPATIENT)
Dept: LAB | Facility: CLINIC | Age: 83
End: 2018-02-02

## 2018-02-02 LAB
ANION GAP SERPL CALCULATED.3IONS-SCNC: 6 MMOL/L (ref 5–18)
BUN SERPL-MCNC: 13 MG/DL (ref 8–28)
CALCIUM SERPL-MCNC: 8.8 MG/DL (ref 8.5–10.5)
CHLORIDE BLD-SCNC: 111 MMOL/L (ref 98–107)
CO2 SERPL-SCNC: 23 MMOL/L (ref 22–31)
CREAT SERPL-MCNC: 1.32 MG/DL (ref 0.7–1.3)
GFR SERPL CREATININE-BSD FRML MDRD: 51 ML/MIN/1.73M2
GLUCOSE BLD-MCNC: 101 MG/DL (ref 70–125)
POTASSIUM BLD-SCNC: 4.6 MMOL/L (ref 3.5–5)
SODIUM SERPL-SCNC: 140 MMOL/L (ref 136–145)

## 2018-02-05 ENCOUNTER — OFFICE VISIT - HEALTHEAST (OUTPATIENT)
Dept: GERIATRICS | Facility: CLINIC | Age: 83
End: 2018-02-05

## 2018-02-05 DIAGNOSIS — F03.90 DEMENTIA WITHOUT BEHAVIORAL DISTURBANCE, UNSPECIFIED DEMENTIA TYPE: ICD-10-CM

## 2018-02-05 DIAGNOSIS — N18.30 STAGE 3 CHRONIC KIDNEY DISEASE (H): ICD-10-CM

## 2018-02-05 DIAGNOSIS — E11.9 DIABETES MELLITUS, TYPE 2 (H): ICD-10-CM

## 2018-02-05 DIAGNOSIS — I10 ESSENTIAL HYPERTENSION: ICD-10-CM

## 2018-02-05 DIAGNOSIS — N40.0 BPH (BENIGN PROSTATIC HYPERPLASIA): ICD-10-CM

## 2018-02-08 ENCOUNTER — OFFICE VISIT - HEALTHEAST (OUTPATIENT)
Dept: GERIATRICS | Facility: CLINIC | Age: 83
End: 2018-02-08

## 2018-02-08 DIAGNOSIS — R53.1 WEAKNESS: ICD-10-CM

## 2018-02-08 DIAGNOSIS — F03.90 DEMENTIA WITHOUT BEHAVIORAL DISTURBANCE, UNSPECIFIED DEMENTIA TYPE: ICD-10-CM

## 2018-02-08 DIAGNOSIS — I10 ESSENTIAL HYPERTENSION: ICD-10-CM

## 2018-02-08 DIAGNOSIS — E11.9 TYPE 2 DIABETES MELLITUS WITHOUT COMPLICATION, UNSPECIFIED LONG TERM INSULIN USE STATUS: ICD-10-CM

## 2018-02-08 DIAGNOSIS — Z95.1 S/P CABG (CORONARY ARTERY BYPASS GRAFT): ICD-10-CM

## 2018-02-12 ENCOUNTER — AMBULATORY - HEALTHEAST (OUTPATIENT)
Dept: GERIATRICS | Facility: CLINIC | Age: 83
End: 2018-02-12

## 2018-03-22 ENCOUNTER — RECORDS - HEALTHEAST (OUTPATIENT)
Dept: LAB | Facility: CLINIC | Age: 83
End: 2018-03-22

## 2018-03-22 LAB
ANION GAP SERPL CALCULATED.3IONS-SCNC: 9 MMOL/L (ref 5–18)
BUN SERPL-MCNC: 11 MG/DL (ref 8–28)
CALCIUM SERPL-MCNC: 9 MG/DL (ref 8.5–10.5)
CHLORIDE BLD-SCNC: 103 MMOL/L (ref 98–107)
CHOLEST SERPL-MCNC: 119 MG/DL
CO2 SERPL-SCNC: 25 MMOL/L (ref 22–31)
CREAT SERPL-MCNC: 1.43 MG/DL (ref 0.7–1.3)
ERYTHROCYTE [DISTWIDTH] IN BLOOD BY AUTOMATED COUNT: 13 % (ref 11–14.5)
FASTING STATUS PATIENT QL REPORTED: ABNORMAL
GFR SERPL CREATININE-BSD FRML MDRD: 47 ML/MIN/1.73M2
GLUCOSE BLD-MCNC: 177 MG/DL (ref 70–125)
HBA1C MFR BLD: 5.5 % (ref 4.2–6.1)
HCT VFR BLD AUTO: 39.4 % (ref 40–54)
HDLC SERPL-MCNC: 30 MG/DL
HGB BLD-MCNC: 13.2 G/DL (ref 14–18)
LDLC SERPL CALC-MCNC: 63 MG/DL
MCH RBC QN AUTO: 33 PG (ref 27–34)
MCHC RBC AUTO-ENTMCNC: 33.5 G/DL (ref 32–36)
MCV RBC AUTO: 99 FL (ref 80–100)
PLATELET # BLD AUTO: 123 THOU/UL (ref 140–440)
PMV BLD AUTO: 9.9 FL (ref 8.5–12.5)
POTASSIUM BLD-SCNC: 4 MMOL/L (ref 3.5–5)
RBC # BLD AUTO: 4 MILL/UL (ref 4.4–6.2)
SODIUM SERPL-SCNC: 137 MMOL/L (ref 136–145)
TRIGL SERPL-MCNC: 132 MG/DL
WBC: 3.8 THOU/UL (ref 4–11)

## 2018-03-23 LAB — 25(OH)D3 SERPL-MCNC: 28.1 NG/ML (ref 30–80)

## 2018-05-17 ENCOUNTER — RECORDS - HEALTHEAST (OUTPATIENT)
Dept: LAB | Facility: CLINIC | Age: 83
End: 2018-05-17

## 2018-05-17 LAB
BASOPHILS # BLD AUTO: 0 THOU/UL (ref 0–0.2)
BASOPHILS NFR BLD AUTO: 1 % (ref 0–2)
EOSINOPHIL # BLD AUTO: 0.1 THOU/UL (ref 0–0.4)
EOSINOPHIL NFR BLD AUTO: 2 % (ref 0–6)
ERYTHROCYTE [DISTWIDTH] IN BLOOD BY AUTOMATED COUNT: 13.1 % (ref 11–14.5)
HCT VFR BLD AUTO: 38.5 % (ref 40–54)
HGB BLD-MCNC: 13.2 G/DL (ref 14–18)
LYMPHOCYTES # BLD AUTO: 1.4 THOU/UL (ref 0.8–4.4)
LYMPHOCYTES NFR BLD AUTO: 29 % (ref 20–40)
MCH RBC QN AUTO: 31.4 PG (ref 27–34)
MCHC RBC AUTO-ENTMCNC: 34.3 G/DL (ref 32–36)
MCV RBC AUTO: 92 FL (ref 80–100)
MONOCYTES # BLD AUTO: 0.3 THOU/UL (ref 0–0.9)
MONOCYTES NFR BLD AUTO: 6 % (ref 2–10)
NEUTROPHILS # BLD AUTO: 3 THOU/UL (ref 2–7.7)
NEUTROPHILS NFR BLD AUTO: 63 % (ref 50–70)
OVALOCYTES: ABNORMAL
PATH REPORT.MICROSCOPIC SPEC OTHER STN: ABNORMAL
PLAT MORPH BLD: ABNORMAL
PLATELET # BLD AUTO: 117 THOU/UL (ref 140–440)
PMV BLD AUTO: 9.8 FL (ref 8.5–12.5)
RBC # BLD AUTO: 4.2 MILL/UL (ref 4.4–6.2)
WBC: 4.8 THOU/UL (ref 4–11)

## 2018-05-18 LAB
LAB AP CHARGES (HE HISTORICAL CONVERSION): NORMAL
PATH REPORT.COMMENTS IMP SPEC: NORMAL
PATH REPORT.COMMENTS IMP SPEC: NORMAL
PATH REPORT.FINAL DX SPEC: NORMAL
PATH REPORT.MICROSCOPIC SPEC OTHER STN: NORMAL
PATH REPORT.RELEVANT HX SPEC: NORMAL

## 2018-09-18 ENCOUNTER — RECORDS - HEALTHEAST (OUTPATIENT)
Dept: LAB | Facility: CLINIC | Age: 83
End: 2018-09-18

## 2018-09-19 LAB
ANION GAP SERPL CALCULATED.3IONS-SCNC: 6 MMOL/L (ref 5–18)
BUN SERPL-MCNC: 17 MG/DL (ref 8–28)
CALCIUM SERPL-MCNC: 9.9 MG/DL (ref 8.5–10.5)
CHLORIDE BLD-SCNC: 106 MMOL/L (ref 98–107)
CO2 SERPL-SCNC: 28 MMOL/L (ref 22–31)
CREAT SERPL-MCNC: 1.65 MG/DL (ref 0.7–1.3)
ERYTHROCYTE [DISTWIDTH] IN BLOOD BY AUTOMATED COUNT: 13.6 % (ref 11–14.5)
GFR SERPL CREATININE-BSD FRML MDRD: 39 ML/MIN/1.73M2
GLUCOSE BLD-MCNC: 156 MG/DL (ref 70–125)
HCT VFR BLD AUTO: 41.5 % (ref 40–54)
HGB BLD-MCNC: 14.5 G/DL (ref 14–18)
MCH RBC QN AUTO: 31.7 PG (ref 27–34)
MCHC RBC AUTO-ENTMCNC: 34.9 G/DL (ref 32–36)
MCV RBC AUTO: 91 FL (ref 80–100)
PLATELET # BLD AUTO: 116 THOU/UL (ref 140–440)
PMV BLD AUTO: 10.1 FL (ref 8.5–12.5)
POTASSIUM BLD-SCNC: 4.3 MMOL/L (ref 3.5–5)
RBC # BLD AUTO: 4.58 MILL/UL (ref 4.4–6.2)
SODIUM SERPL-SCNC: 140 MMOL/L (ref 136–145)
WBC: 5 THOU/UL (ref 4–11)

## 2018-09-20 LAB — HBA1C MFR BLD: 6.6 % (ref 4.2–6.1)

## 2018-12-11 ENCOUNTER — RECORDS - HEALTHEAST (OUTPATIENT)
Dept: LAB | Facility: CLINIC | Age: 83
End: 2018-12-11

## 2018-12-12 LAB
ANION GAP SERPL CALCULATED.3IONS-SCNC: 6 MMOL/L (ref 5–18)
BUN SERPL-MCNC: 25 MG/DL (ref 8–28)
CALCIUM SERPL-MCNC: 9.4 MG/DL (ref 8.5–10.5)
CHLORIDE BLD-SCNC: 108 MMOL/L (ref 98–107)
CO2 SERPL-SCNC: 28 MMOL/L (ref 22–31)
CREAT SERPL-MCNC: 1.87 MG/DL (ref 0.7–1.3)
GFR SERPL CREATININE-BSD FRML MDRD: 34 ML/MIN/1.73M2
GLUCOSE BLD-MCNC: 118 MG/DL (ref 70–125)
HBA1C MFR BLD: 6.7 % (ref 4.2–6.1)
POTASSIUM BLD-SCNC: 4.7 MMOL/L (ref 3.5–5)
SODIUM SERPL-SCNC: 142 MMOL/L (ref 136–145)

## 2019-03-20 ENCOUNTER — DOCUMENTATION ONLY (OUTPATIENT)
Dept: OTHER | Facility: CLINIC | Age: 84
End: 2019-03-20

## 2019-04-29 ENCOUNTER — DOCUMENTATION ONLY (OUTPATIENT)
Dept: OTHER | Facility: CLINIC | Age: 84
End: 2019-04-29

## 2019-04-29 ENCOUNTER — AMBULATORY - HEALTHEAST (OUTPATIENT)
Dept: OTHER | Facility: CLINIC | Age: 84
End: 2019-04-29

## 2019-05-01 ENCOUNTER — DOCUMENTATION ONLY (OUTPATIENT)
Dept: OTHER | Facility: CLINIC | Age: 84
End: 2019-05-01

## 2019-05-01 ENCOUNTER — AMBULATORY - HEALTHEAST (OUTPATIENT)
Dept: OTHER | Facility: CLINIC | Age: 84
End: 2019-05-01

## 2019-06-17 ENCOUNTER — RECORDS - HEALTHEAST (OUTPATIENT)
Dept: ADMINISTRATIVE | Facility: OTHER | Age: 84
End: 2019-06-17

## 2019-12-02 NOTE — PROGRESS NOTES
[facility], [place]    Internal Medicine Progress Note - Gold Service      Assessment & Plan   Christopher Cuevas is a 89 year old male with a PMH of dementia, HTN, CAD s/p CABG, BPH and GERD who was transferred to [facility] on 1/8/2018 for confusion and concern for intracranial hemorrhage.  ICH was ruled out by imaging, and the leading differential diagnosis for his confusion is uncontrolled hyperglycemia secondary to recently diagnosed Type 2 DM.      Plans for today:  Continue monitoring glucoses with plan to try to simplify BG control regimen.  Hot pad to right neck for muscle cramp  Increase emollient application to 4 times daily.  Precede BID steroid application with wet wraps - rash care orders clarified in EPIC orders.    # RUE choreoathetosis, witnessed fall, Metabolic Encephalopathy:   Pt. Presented to OSH after suffering a mechanical fall from standing (1/7/17) with reported head trauma and CT with concern for ICH. Family noted RU and possible RLE uncontrolled movement PTA. On admission to NICU CT with hyperdensity of the left putamen, diffuse cerebral volume loss and cerebral white matter changes consistent with chronic small vessel disease. Repeat CT with re demonstration of diffuse hyperdensity of the left putamen- likely remineralization. MRI obtained that showed asymmetric hyperdensity in the basal ganglia on CT (which is likely related to prolonged hyperglycemia) Overall the findings are consistent with non ketotic hyperglycemia which can cause choreoathetoid movements.  - continue with hyperglycemia management as below  - no delirium or agitation since 1/14 - sitter discontinued on 1/15. Mental status is still at baseline today.  - fall precautions; delirium precautions continued  - trying to maintain sleep-wake cycle -continued with trazodone 50 mg  - PT/OT involved - appreciate treatment and recommendations  - continue aspirin   - may not need neurology follow up as an  outpatient if blood glucose control alone allows for stability - no chorea yesterday or today - will continue to monitor   -off sitter (from 1/15); SW helping to arrange for dispo - may be appropriate for TCU versus transfer back to living facility's memory care unit with therapy (if appropriate therapy is available)     # Hyperglycemia, DMII, HHS:    Resolved HHS;   Hba1c of 14.1  - Transitioned to basal bolus regimen - but this would be difficult to administer in his current assisted living setting - so will either need simplification of his regimen or d/c to higher level of care than assisted living.  Discussed the potential need to d/c to higher level of care if unable to simplify his regimen, with daughter, Graciela, yesterday (1/16), and she expressed understanding. Will continue discussion with family today.  - Greatly appreciate Endocrinology consultation and recommendations.  - Will need endocrinology follow up in 2-3 months for DM and adrenal nodule which was found incidentally     #Eczema  Patient with erythematous rash on bilateral UE with slight extension to the back. Dermatology was consulted- eczematous type rash  - Closely monitor - rash quite faint but with some areas of cracking skin  - started on triamcinolone 0.1% BID and emollient as per derm recs. Complete rash care instructions: Twice daily apply soaked warm wash cloths to affected areas on arms and upper back, covered by a dry towel, and allow to sit for 10 minutes.  Remove wet wash cloths and pat dry area.  Then apply triamcinolone ointment, followed by a generous amount of emollient cream. Additional applications of emollient cream alone to be applied twice more daily.    - will need dermatology follow up as an outpatient in 1-2 months   - started on cetrizine 10 mg PO BID  -  can be d/pito if rash resolves    # Hypercalcemia: Ca on admission 10.9 --> 10.5--> 9.8. Concern for hypercalcemia which can be attributed by HCTZ, along with delirium  and chorea; daughter did mention that he had weight loss in 6 months. To r/o underlying malignancy sent for CT chest, abdomen and pelvis -no real evidence of malignancy. Does have multiple non obstructing stones-urology consulted and will need outpatient follow up;  Also adrenal nodule which is an incidental findings-discussed with endocrinology about that -will follow as an outpatient   - hypercalcemia resolved ; secondary to dehydration and use of thiazides   - stop IVF today - will recheck Calcium tomorrow.  - vitamin D2 level is low - started on cholecalciferol 1000 units daily     # Pancytopenia : Platelets 139 --> 111, also noted hemoglobin and WBC is dropping  -still could be dilutional with IV fluids  -work up initiated with vitamin b12, iron panel and ferritin, LDH, haptoglobin, d-dimer, peripheral smear and retics -which is unremarkable and consistent with anemia of chronic disease.  Peripheral smear not suggestive of MDS, though this remains on the differential.  -retics is inappropriate for the level of anemia which could be related to CKD  - continue to monitor      # HTN, CAD s/p CABG: No hx on file.    PTA ASA, lisinopril, HCTZ.   -will continue to hold HCTZ and lisinopril due to dehydration;   -started on amlodipine 5mg -remained stable with 5 mg amlodipine      # ANISHA on CKD: Cr on admission 3.30 BUN elevated to 70 BL appears  ~ 2.0. Cr down to 2.10 --> 1.6 with IVF. Suspect prerenal in setting of hypovolemia.   - also has known h/o CKD with uncontrolled diabetes  -some improvement with hydration but majority is CKD  -in euvolemic state- will d/c IV fluids   -creatinine to 1.6 which is near to baseline     # BPH: PTA Flomax.     Diet: Regular Diet Adult  Snacks/Supplements Adult: Glucerna (Adult); With Meals  Fluids: IV fluids   DVT Prophylaxis: chemical prophylaxis holding off due to low platelets   Code Status: DNR/DNI    Disposition Plan   Expected discharge: 2-3 days , recommended to TCU versus  "memory care center once glucose control regimen is determined and mental status continues to be at baseline.     Entered: Minor Gonsalez 01/17/2018, 8:25 AM   Information in the above section will display in the discharge planner report.      The patient's care was discussed with the Bedside Nurse , and patient.     Minor Gonsalez  Internal Medicine Staff Hospitalist Service  [facility]   Please see sticky note for cross cover information    Interval History    Alert this AM without any complaints.  Feels that he is stronger than he was when he came in.  No abnormal arm movements. Does note that he has new pain in the posterior lateral aspect of his right neck. \"Feels like a crick in my neck\" He noticed the pain this morning after he got out of bed, and it is exacerbated by turning his head to the left.  Pain is 4/10, going to 7/10 with head turning. No radiation. No visual changes.     Last 24 hr care team notes reviewed.   ROS:  4 point ROS including Respiratory, CV, GI and , other than that noted in the HPI, is negative.     Data reviewed today: I reviewed all medications and new labs results over the last 24 hours.   Physical Exam   Vital Signs: Temp: 97.6  F (36.4  C) Temp src: Oral BP: 137/82 Pulse: 98 Heart Rate: 98 Resp: 16 SpO2: 97 % O2 Device: None (Room air)    Weight: 167 lbs 4.8 oz  General Appearance: Alert and oriented to person, place, date and situation.  Respiratory: CTAB, no wheezes   Cardiovascular: s1s2 with no murmur   GI: soft, non tender, bowel sounds noted   Skin: presence of faint, erythematous macular rash on upper back primarily, with a few areas of flaking and skin cracking.  MSK: small area of tenderness in right trapezius, with palpable muscle knot.   Neuro: AAOx1, b/l UE and LE-4/5; non focal           Data   Data     Recent Labs  Lab 01/17/18  0755 01/16/18  0646 01/15/18  0804 01/14/18  0714   WBC 3.6* 2.7* 2.6* 3.3*   HGB 11.3* 9.7* 9.6* 10.4*   MCV 95 95 95 " 94   PLT 94* 79* 81* 91*   NA  --  142 142 146*   POTASSIUM  --  4.2 3.8 4.1   CHLORIDE  --  112* 114* 118*   CO2  --  22 21 21   BUN  --  15 17 17   CR  --  1.34* 1.46* 1.61*   ANIONGAP  --  8 7 8   LIZ  --  8.1* 8.0* 8.5   GLC  --  180* 179* 155*        eyeglasses

## 2020-03-10 ENCOUNTER — RECORDS - HEALTHEAST (OUTPATIENT)
Dept: LAB | Facility: CLINIC | Age: 85
End: 2020-03-10

## 2020-03-10 LAB
ALBUMIN SERPL-MCNC: 3.8 G/DL (ref 3.5–5)
ALP SERPL-CCNC: 56 U/L (ref 45–120)
ALT SERPL W P-5'-P-CCNC: <9 U/L (ref 0–45)
ANION GAP SERPL CALCULATED.3IONS-SCNC: 7 MMOL/L (ref 5–18)
AST SERPL W P-5'-P-CCNC: 10 U/L (ref 0–40)
BILIRUB SERPL-MCNC: 0.8 MG/DL (ref 0–1)
BUN SERPL-MCNC: 14 MG/DL (ref 8–28)
CALCIUM SERPL-MCNC: 9.3 MG/DL (ref 8.5–10.5)
CHLORIDE BLD-SCNC: 104 MMOL/L (ref 98–107)
CO2 SERPL-SCNC: 28 MMOL/L (ref 22–31)
CREAT SERPL-MCNC: 1.3 MG/DL (ref 0.7–1.3)
ERYTHROCYTE [DISTWIDTH] IN BLOOD BY AUTOMATED COUNT: 13.7 % (ref 11–14.5)
GFR SERPL CREATININE-BSD FRML MDRD: 52 ML/MIN/1.73M2
GLUCOSE BLD-MCNC: 99 MG/DL (ref 70–125)
HBA1C MFR BLD: 6.1 %
HCT VFR BLD AUTO: 40.3 % (ref 40–54)
HGB BLD-MCNC: 13.9 G/DL (ref 14–18)
MCH RBC QN AUTO: 32.9 PG (ref 27–34)
MCHC RBC AUTO-ENTMCNC: 34.5 G/DL (ref 32–36)
MCV RBC AUTO: 96 FL (ref 80–100)
PLATELET # BLD AUTO: 114 THOU/UL (ref 140–440)
PMV BLD AUTO: 10.1 FL (ref 8.5–12.5)
POTASSIUM BLD-SCNC: 4.1 MMOL/L (ref 3.5–5)
PROT SERPL-MCNC: 6.3 G/DL (ref 6–8)
RBC # BLD AUTO: 4.22 MILL/UL (ref 4.4–6.2)
SODIUM SERPL-SCNC: 139 MMOL/L (ref 136–145)
TSH SERPL DL<=0.005 MIU/L-ACNC: 3.84 UIU/ML (ref 0.3–5)
VIT B12 SERPL-MCNC: 165 PG/ML (ref 213–816)
WBC: 4.3 THOU/UL (ref 4–11)

## 2020-07-27 ENCOUNTER — RECORDS - HEALTHEAST (OUTPATIENT)
Dept: LAB | Facility: CLINIC | Age: 85
End: 2020-07-27

## 2020-07-28 LAB
ANION GAP SERPL CALCULATED.3IONS-SCNC: 7 MMOL/L (ref 5–18)
BUN SERPL-MCNC: 22 MG/DL (ref 8–28)
CALCIUM SERPL-MCNC: 9 MG/DL (ref 8.5–10.5)
CHLORIDE BLD-SCNC: 108 MMOL/L (ref 98–107)
CO2 SERPL-SCNC: 27 MMOL/L (ref 22–31)
CREAT SERPL-MCNC: 1.42 MG/DL (ref 0.7–1.3)
GFR SERPL CREATININE-BSD FRML MDRD: 47 ML/MIN/1.73M2
GLUCOSE BLD-MCNC: 138 MG/DL (ref 70–125)
HGB BLD-MCNC: 11.3 G/DL (ref 14–18)
POTASSIUM BLD-SCNC: 4.3 MMOL/L (ref 3.5–5)
SODIUM SERPL-SCNC: 142 MMOL/L (ref 136–145)

## 2020-07-29 ENCOUNTER — HOME CARE/HOSPICE - HEALTHEAST (OUTPATIENT)
Dept: HOME HEALTH SERVICES | Facility: HOME HEALTH | Age: 85
End: 2020-07-29

## 2020-08-06 ENCOUNTER — RECORDS - HEALTHEAST (OUTPATIENT)
Dept: LAB | Facility: CLINIC | Age: 85
End: 2020-08-06

## 2020-08-09 LAB
SARS-COV-2 BY NAA - HISTORICAL: NOT DETECTED
SARS-COV-2 SOURCE - HISTORICAL: NORMAL

## 2020-08-20 ENCOUNTER — PATIENT OUTREACH (OUTPATIENT)
Dept: CARE COORDINATION | Facility: CLINIC | Age: 85
End: 2020-08-20

## 2021-05-25 ENCOUNTER — RECORDS - HEALTHEAST (OUTPATIENT)
Dept: ADMINISTRATIVE | Facility: CLINIC | Age: 86
End: 2021-05-25

## 2021-05-31 VITALS — WEIGHT: 161.6 LBS | BODY MASS INDEX: 24.57 KG/M2

## 2021-05-31 VITALS — BODY MASS INDEX: 24.51 KG/M2 | WEIGHT: 161.2 LBS

## 2021-06-01 VITALS — BODY MASS INDEX: 24.18 KG/M2 | WEIGHT: 159 LBS

## 2021-06-01 VITALS — BODY MASS INDEX: 24.56 KG/M2 | WEIGHT: 161.5 LBS

## 2021-06-15 NOTE — PROGRESS NOTES
[facility]    DATE: 2018    NAME:  Christopher Cuevas             :  1928  MRN: [id number]  CODE STATUS:  DNR    FACILITY:  [facility] [[id number]]       ROOM:  213    CHIEF COMPLAINT/REASON FOR VISIT:  Chief Complaint   Patient presents with     Problem Visit     Diabetes Mellitus     HISTORY OF PRESENT ILLNESS: Christopher Cuevas is a 89 y.o. male with Dementia, HTN, CAD - s/p CABG, BPH, and GERD who was transferred to [facility] on 2018 for confusion and concern for ICH sustained after a mechanical fall at home. Patient fell from a standing position with reported head trauma concern for ICH.  ICH was ruled out by imaging. However, imaging did reveal non ketotic hyperglycemia which can cause choreoathetoid movements. With tightened but not strict glucose control, Delirium improved. Choreoathetosis resolved on 2018.  Acute on CKD resolved after discontinuing Thiazide and rehydration (Cr 3.30 on admission but returned to 1.6 which is baseline).  Pancytopenia should be followed as outpatient. Last Platelet is 106. Hgb is low but WBC WNL.  Hypercalcemia also need to be followed as outpatient. Calcium in hospital was as high as 10.9 which was thought to be related to HCTZ (whcih was discontinued).  CT of the chest, abdomen, and pelvis revealed no evidence of malignancy.  He does have non obstructing stones that would need to be worked up as outpatient.   Patient was stabilized and transferred to TCU for continued rehabilitation.     Today, patient is seen at the bedside.  Patient has Diabetes Mellitus with stable blood sugars.  Currently he is on Novolog, Lantus, and Victoza.  He has blood sugar checks QID.  Blood pressures have improved with recent adjustment of medication. Appetite is good. No urinary symptomology.  He has Dementia and therapy recommends 24 hour supervision preferably in a Memory care units.    Past Medical History:   Diagnosis Date     Actinic keratosis       BPH (benign prostatic hyperplasia)      CAD (coronary artery disease)      CKD (chronic kidney disease)      Dementia      Diabetes mellitus type II, controlled      Eczema      Edema      Functional dyspepsia      GERD (gastroesophageal reflux disease)      Hyperkalemia      Hyperlipemia      Hypertension      Neuropathy      Pancytopenia      Primary insomnia      Rosacea      S/P CABG (coronary artery bypass graft)      Skin cancer      Past Surgical History:   Procedure Laterality Date     CATARACT EXTRACTION W/  INTRAOCULAR LENS IMPLANT Left      CORONARY ARTERY BYPASS GRAFT       HERNIA REPAIR       Family History   Problem Relation Age of Onset     Heart attack Mother      Stroke Mother      Prostate cancer Father      Social History     Social History     Marital status:      Spouse name: N/A     Number of children: N/A     Years of education: N/A     Occupational History     Not on file.     Social History Main Topics     Smoking status: Former Smoker     Years: 2.00     Types: Cigarettes     Quit date: 1948     Smokeless tobacco: Never Used     Alcohol use 0.6 oz/week     1 Cans of beer per week     Drug use: No     Sexual activity: Not on file     Other Topics Concern     Not on file     Social History Narrative     No Known Allergies     Current Outpatient Prescriptions   Medication Sig Dispense Refill     acetaminophen (TYLENOL) 325 MG tablet Take 650 mg by mouth every 6 (six) hours as needed for pain.       amLODIPine (NORVASC) 5 MG tablet Take 5 mg by mouth daily.       aspirin 81 MG EC tablet Take 81 mg by mouth daily.       calcium carbonate (OS-LIZ) 500 mg calcium (1,250 mg) chewable tablet Chew 1 tablet 3 (three) times a day as needed for heartburn.       cetirizine (ZYRTEC) 10 MG tablet Take 10 mg by mouth 2 (two) times a day.       cholecalciferol, vitamin D3, 2,000 unit Tab Take 2,000 Units by mouth daily.       docusate sodium (COLACE) 100 MG capsule Take 200 mg by mouth 2 (two)  times a day.       emollient (BIAFINE) cream Apply 1 application topically 4 (four) times a day.       fluocinonide (LIDEX) 0.05 % ointment Apply 1 application topically 2 (two) times a day.       imiquimod (ALDARA) 5 % cream Apply 1 application topically 3 (three) times a week.       insulin aspart (NOVOLOG) 100 unit/mL injection Inject under the skin at bedtime. Per sliding scale: Do Not give Bedtime Correction Insulin if BG less than 200BG 200-239 give 1 unit.-279 give 2 units.-319 give 3 units.-359 give 4 units.BG >/=360 give 5 units       insulin aspart (NOVOLOG) 100 unit/mL injection Inject under the skin 3 (three) times a day before meals. Per sliding scale: Do Not give Correction Insulin if Pre-Meal BG less than 140 ISF 40 1 per 40 >/= 140.-179 give 1 unit.-219 give 2 units.-259 give 3 units.-299 give 4 units.-339 give 5 units.BG >/= 340 give 6 units.       insulin aspart (NOVOLOG) 100 unit/mL injection Inject under the skin. With meals and snacks or supplements: DOSE: 1 units per 15 grams of carbohydrate.       liraglutide (VICTOZA) 0.6 mg/0.1 mL (18 mg/3 mL) PnIj injection Inject 0.6 mg under the skin daily.       lisinopril (PRINIVIL,ZESTRIL) 20 MG tablet Take 20 mg by mouth daily.       mupirocin (BACTROBAN) 2 % ointment Apply 1 application topically 2 (two) times a day.       omeprazole (PRILOSEC) 20 MG capsule Take 20 mg by mouth daily before breakfast.       pravastatin (PRAVACHOL) 20 MG tablet Take 20 mg by mouth bedtime.       senna (SENOKOT) 8.6 mg tablet Take 1 tablet by mouth 2 (two) times a day as needed for constipation.       sodium chloride (OCEAN) 0.65 % nasal spray 1 spray into each nostril every hour as needed for congestion.       sodium hypochlorite (DAKIN'S, FULL STRENGTH,) external solution Apply 1 application topically 2 (two) times a day.       tamsulosin (FLOMAX) 0.4 mg Cp24 Take 0.4 mg by mouth Daily after breakfast.        traZODone  (DESYREL) 50 MG tablet Take 50 mg by mouth at bedtime.       No current facility-administered medications for this visit.      REVIEW OF SYSTEMS:    Currently, no fever, chills, or rigors. Does not have any visual or hearing problems. Denies any chest pain, headaches, palpitations, lightheadedness, dizziness, shortness of breath, or cough. Appetite is good. Denies any GERD symptoms. Denies any difficulty with swallowing, nausea, or vomiting.  Denies any abdominal pain, diarrhea or constipation. Denies any urinary symptoms. No insomnia. No active bleeding. No rash.     PHYSICAL EXAMINATION:  Vitals:    02/05/18 2236   BP: 140/66   Pulse: 74   Resp: 16   Temp: 97.7  F (36.5  C)   SpO2: 97%   Weight: 161 lb 8 oz (73.3 kg)     GENERAL: Awake, Alert, oriented x3, not in any form of acute distress, answers questions appropriately, follows simple commands, conversant  HEENT: Head is normocephalic with normal hair distribution. No evidence of trauma. Ears: No acute purulent discharge. Eyes: Conjunctivae pink with no scleral jaundice. Nose: Normal mucosa and septum. NECK: Supple with no cervical or supraclavicular lymphadenopathy. Trachea is midline.   CHEST: No tenderness or deformity, no crepitus  LUNG: Clear to auscultation with good chest expansion. There are no crackles or wheezes, normal AP diameter.  BACK: No kyphosis of the thoracic spine. Symmetric, no curvature, ROM normal, no CVA tenderness, no spinal tenderness   CVS: There is good S1  S2, there are no murmurs, rubs, gallops, or heaves, rhythm is regular,  2+ pulses symmetric in all extremities.  ABDOMEN: Globular and soft, nontender to palpation, non distended, no masses, no organomegaly, good bowel sounds, no rebound or guarding, no peritoneal signs.   EXTREMITIES:  Full range of motion on both upper and lower extremities, there is no tenderness to palpation, no pedal edema, no cyanosis or clubbing, no calf tenderness.  Pulses equal in all extremities, normal  cap refill, no joint swelling.  SKIN: Warm and dry, no erythema noted.  Skin color, texture, no rashes or lesions.  NEUROLOGICAL: The patient is oriented to person, place and time. Strength and sensation are grossly intact. Face is symmetric.    LABS:    Lab Results   Component Value Date    WBC 4.4 03/06/2017    HGB 13.3 (L) 03/06/2017    HCT 38.8 (L) 03/06/2017    MCV 94 03/06/2017     (L) 03/06/2017     Results for orders placed or performed in visit on 02/02/18   Basic Metabolic Panel   Result Value Ref Range    Sodium 140 136 - 145 mmol/L    Potassium 4.6 3.5 - 5.0 mmol/L    Chloride 111 (H) 98 - 107 mmol/L    CO2 23 22 - 31 mmol/L    Anion Gap, Calculation 6 5 - 18 mmol/L    Glucose 101 70 - 125 mg/dL    Calcium 8.8 8.5 - 10.5 mg/dL    BUN 13 8 - 28 mg/dL    Creatinine 1.32 (H) 0.70 - 1.30 mg/dL    GFR MDRD Af Amer >60 >60 mL/min/1.73m2    GFR MDRD Non Af Amer 51 (L) >60 mL/min/1.73m2     Lab Results   Component Value Date    HGBA1C 6.9 (H) 07/03/2016     Lab Results   Component Value Date    [id number] 295 07/04/2016       ASSESSMENT/PLAN:    1. Diabetes mellitus, type 2 - Discontinue Novolog and decrease blood sugars checks to TID for 1 week and then BID.  Continue  Lantus and Victoza   2. Dementia without behavioral disturbance, unspecified dementia type - Patient requires a  unit or 24 hour supervision after discharge   3. Essential hypertension - Blood pressures are within target range, will continue Norvasc   4. Stage 3 chronic kidney disease - Stable   5. BPH (benign prostatic hyperplasia)  - Continue Flomax                     Electronically signed by:  Damon Friend CNP

## 2021-06-15 NOTE — PROGRESS NOTES
[facility]    DATE: 2018    NAME:  Christopher Cuevas             :  1928  MRN: [id number]  CODE STATUS:  DNR    FACILITY:  [facility] [[id number]]       ROOM:  213    CHIEF COMPLAINT/REASON FOR VISIT:  Chief Complaint   Patient presents with     Problem Visit     Uncontrolled Diabetes Mellitus type 2     HISTORY OF PRESENT ILLNESS: Christopher Cuevas is a 89 y.o. male with Dementia, HTN, CAD - s/p CABG, BPH, and GERD who was transferred to [facility] on 2018 for confusion and concern for ICH sustained after a mechanical fall at home. Patient fell from a standing position with reported head trauma concern for ICH.  ICH was ruled out by imaging. However, imaging did reveal non ketotic hyperglycemia which can cause choreoathetoid movements. With tightened but not strict glucose control, Delirium improved. Choreoathetosis resolved on 2018.  Acute on CKD resolved after discontinuing Thiazide and rehydration (Cr 3.30 on admission but returned to 1.6 which is baseline).  Pancytopenia should be followed as outpatient. Last Platelet is 106. Hgb is low but WBC WNL.  Hypercalcemia also need to be followed as outpatient. Calcium in hospital was as high as 10.9 which was thought to be related to HCTZ (whcih was discontinued).  CT of the chest, abdomen, and pelvis revealed no evidence of malignancy.  She does have non obstructing stones that would need to be worked up as outpatient.   Patient was stabilized and transferred to TCU for continued rehabilitation.     Past Medical History:   Diagnosis Date     Actinic keratosis      BPH (benign prostatic hyperplasia)      CAD (coronary artery disease)      CKD (chronic kidney disease)      Dementia      Diabetes mellitus type II, controlled      Eczema      Edema      Functional dyspepsia      GERD (gastroesophageal reflux disease)      Hyperkalemia      Hyperlipemia      Hypertension      Neuropathy      Pancytopenia      Primary insomnia       Rosacea      S/P CABG (coronary artery bypass graft)      Skin cancer      Past Surgical History:   Procedure Laterality Date     CATARACT EXTRACTION W/  INTRAOCULAR LENS IMPLANT Left      CORONARY ARTERY BYPASS GRAFT       HERNIA REPAIR       Family History   Problem Relation Age of Onset     Heart attack Mother      Stroke Mother      Prostate cancer Father      Social History     Social History     Marital status:      Spouse name: N/A     Number of children: N/A     Years of education: N/A     Occupational History     Not on file.     Social History Main Topics     Smoking status: Former Smoker     Years: 2.00     Types: Cigarettes     Quit date: 1948     Smokeless tobacco: Never Used     Alcohol use 0.6 oz/week     1 Cans of beer per week     Drug use: No     Sexual activity: Not on file     Other Topics Concern     Not on file     Social History Narrative     No Known Allergies     Current Outpatient Prescriptions   Medication Sig Dispense Refill     acetaminophen (TYLENOL) 325 MG tablet Take 650 mg by mouth every 6 (six) hours as needed for pain.       amLODIPine (NORVASC) 5 MG tablet Take 5 mg by mouth daily.       aspirin 81 MG EC tablet Take 81 mg by mouth daily.       calcium carbonate (OS-LIZ) 500 mg calcium (1,250 mg) chewable tablet Chew 1 tablet 3 (three) times a day as needed for heartburn.       cetirizine (ZYRTEC) 10 MG tablet Take 10 mg by mouth 2 (two) times a day.       cholecalciferol, vitamin D3, 2,000 unit Tab Take 2,000 Units by mouth daily.       docusate sodium (COLACE) 100 MG capsule Take 200 mg by mouth 2 (two) times a day.       emollient (BIAFINE) cream Apply 1 application topically 4 (four) times a day.       fluocinonide (LIDEX) 0.05 % ointment Apply 1 application topically 2 (two) times a day.       imiquimod (ALDARA) 5 % cream Apply 1 application topically 3 (three) times a week.       insulin aspart (NOVOLOG) 100 unit/mL injection Inject under the skin at bedtime. Per  sliding scale: Do Not give Bedtime Correction Insulin if BG less than 200BG 200-239 give 1 unit.-279 give 2 units.-319 give 3 units.-359 give 4 units.BG >/=360 give 5 units       insulin aspart (NOVOLOG) 100 unit/mL injection Inject under the skin 3 (three) times a day before meals. Per sliding scale: Do Not give Correction Insulin if Pre-Meal BG less than 140 ISF 40 1 per 40 >/= 140.-179 give 1 unit.-219 give 2 units.-259 give 3 units.-299 give 4 units.-339 give 5 units.BG >/= 340 give 6 units.       insulin aspart (NOVOLOG) 100 unit/mL injection Inject under the skin. With meals and snacks or supplements: DOSE: 1 units per 15 grams of carbohydrate.       liraglutide (VICTOZA) 0.6 mg/0.1 mL (18 mg/3 mL) PnIj injection Inject 0.6 mg under the skin daily.       lisinopril (PRINIVIL,ZESTRIL) 20 MG tablet Take 20 mg by mouth daily.       mupirocin (BACTROBAN) 2 % ointment Apply 1 application topically 2 (two) times a day.       pravastatin (PRAVACHOL) 20 MG tablet Take 20 mg by mouth bedtime.       senna (SENOKOT) 8.6 mg tablet Take 1 tablet by mouth 2 (two) times a day as needed for constipation.       sodium chloride (OCEAN) 0.65 % nasal spray 1 spray into each nostril every hour as needed for congestion.       sodium hypochlorite (DAKIN'S, FULL STRENGTH,) external solution Apply 1 application topically 2 (two) times a day.       tamsulosin (FLOMAX) 0.4 mg Cp24 Take 0.4 mg by mouth Daily after breakfast.        traZODone (DESYREL) 50 MG tablet Take 50 mg by mouth at bedtime.       No current facility-administered medications for this visit.      REVIEW OF SYSTEMS:    Currently, no fever, chills, or rigors. Does not have any visual or hearing problems. Denies any chest pain, headaches, palpitations, lightheadedness, dizziness, shortness of breath, or cough. Appetite is good. Denies any GERD symptoms. Denies any difficulty with swallowing, nausea, or vomiting.  Denies any  abdominal pain, diarrhea or constipation. Denies any urinary symptoms. No insomnia. No active bleeding. No rash.     PHYSICAL EXAMINATION:  Vitals:    01/22/18 2223   BP: 99/46   Pulse: 86   Resp: 16   Temp: 98.1  F (36.7  C)   SpO2: 97%   Weight: 161 lb 3.2 oz (73.1 kg)     GENERAL: Awake, Alert, oriented x3, not in any form of acute distress, answers questions appropriately, follows simple commands, conversant  HEENT: Head is normocephalic with normal hair distribution. No evidence of trauma. Ears: No acute purulent discharge. Eyes: Conjunctivae pink with no scleral jaundice. Nose: Normal mucosa and septum. NECK: Supple with no cervical or supraclavicular lymphadenopathy. Trachea is midline.   CHEST: No tenderness or deformity, no crepitus  LUNG: Clear to auscultation with good chest expansion. There are no crackles or wheezes, normal AP diameter.  BACK: No kyphosis of the thoracic spine. Symmetric, no curvature, ROM normal, no CVA tenderness, no spinal tenderness   CVS: There is good S1  S2, there are no murmurs, rubs, gallops, or heaves, rhythm is regular,  2+ pulses symmetric in all extremities.  ABDOMEN: Globular and soft, nontender to palpation, non distended, no masses, no organomegaly, good bowel sounds, no rebound or guarding, no peritoneal signs.   EXTREMITIES:  Full range of motion on both upper and lower extremities, there is no tenderness to palpation, no pedal edema, no cyanosis or clubbing, no calf tenderness.  Pulses equal in all extremities, normal cap refill, no joint swelling.  SKIN: Warm and dry, no erythema noted.  Skin color, texture, no rashes or lesions.  NEUROLOGICAL: The patient is oriented to person, place and time. Strength and sensation are grossly intact. Face is symmetric.    LABS:    Lab Results   Component Value Date    WBC 4.4 03/06/2017    HGB 13.3 (L) 03/06/2017    HCT 38.8 (L) 03/06/2017    MCV 94 03/06/2017     (L) 03/06/2017     Results for orders placed or performed in  visit on 01/22/18   Basic Metabolic Panel   Result Value Ref Range    Sodium 137 136 - 145 mmol/L    Potassium 3.8 3.5 - 5.0 mmol/L    Chloride 108 (H) 98 - 107 mmol/L    CO2 22 22 - 31 mmol/L    Anion Gap, Calculation 7 5 - 18 mmol/L    Glucose 104 70 - 125 mg/dL    Calcium 8.6 8.5 - 10.5 mg/dL    BUN 14 8 - 28 mg/dL    Creatinine 1.46 (H) 0.70 - 1.30 mg/dL    GFR MDRD Af Amer 55 (L) >60 mL/min/1.73m2    GFR MDRD Non Af Amer 45 (L) >60 mL/min/1.73m2     Lab Results   Component Value Date    HGBA1C 6.9 (H) 07/03/2016     Lab Results   Component Value Date    [id number] 295 07/04/2016       ASSESSMENT/PLAN:    1. Diabetes Mellitus, type 2 - Continue Insulin with target 125 - 250 due to age and underlying Dementia.  Last Hgb A1c was 6.9%   2. Upper extremity weakness - Continue PT/OT   3. Essential hypertension - Blood pressures are within target range, will continue Norvasc   4. Stage 3 chronic kidney disease - Improved, current Cr 1.46 - baseline Cr 1.6   5. BPH (benign prostatic hyperplasia) - Continue Flomax   6. CAD (coronary artery disease) - s/p CABG. No overt sign or symptoms of decompensation, will continue ASA         Electronically signed by:  Damon Friend CNP    35 minutes TT of which 50% was spent in counseling and coordination of care of the above plan.    Time spent in interview and examination of patient, review of available records, and discussion with nursing staff. Continue care plan, efforts at therapy, and monitor nutritional status.

## 2021-06-15 NOTE — PROGRESS NOTES
[facility]    DATE: 2018    NAME:  Christopher Cuevas             :  1928  MRN: [id number]  CODE STATUS:  DNR    FACILITY:  formerly [facility] [[id number]]       ROOM:  213    CHIEF COMPLAINT/REASON FOR VISIT:  Chief Complaint   Patient presents with     Problem Visit     Weakness and h/o falls     HISTORY OF PRESENT ILLNESS: Christopher Cuevas is a 89 y.o. male with Dementia, HTN, CAD - s/p CABG, BPH, and GERD who was transferred to [facility] on 2018 for confusion and concern for ICH sustained after a mechanical fall at home. Patient fell from a standing position with reported head trauma concern for ICH.  ICH was ruled out by imaging. However, imaging did reveal non ketotic hyperglycemia which can cause choreoathetoid movements. With tightened but not strict glucose control, Delirium improved. Choreoathetosis resolved on 2018.  Acute on CKD resolved after discontinuing Thiazide and rehydration (Cr 3.30 on admission but returned to 1.6 which is baseline).  Pancytopenia should be followed as outpatient. Last Platelet is 106. Hgb is low but WBC WNL.  Hypercalcemia also need to be followed as outpatient. Calcium in hospital was as high as 10.9 which was thought to be related to HCTZ (whcih was discontinued).  CT of the chest, abdomen, and pelvis revealed no evidence of malignancy.  He does have non obstructing stones that would need to be worked up as outpatient.   Patient was stabilized and transferred to TCU for continued rehabilitation.     Today, patient is seen at the bedside.  He denies any pain and hasn't had any recent falls since admission. Normotensive. Appetite is good. No urinary symptomology.    Past Medical History:   Diagnosis Date     Actinic keratosis      BPH (benign prostatic hyperplasia)      CAD (coronary artery disease)      CKD (chronic kidney disease)      Dementia      Diabetes mellitus type II, controlled      Eczema      Edema      Functional dyspepsia       GERD (gastroesophageal reflux disease)      Hyperkalemia      Hyperlipemia      Hypertension      Neuropathy      Pancytopenia      Primary insomnia      Rosacea      S/P CABG (coronary artery bypass graft)      Skin cancer      Past Surgical History:   Procedure Laterality Date     CATARACT EXTRACTION W/  INTRAOCULAR LENS IMPLANT Left      CORONARY ARTERY BYPASS GRAFT       HERNIA REPAIR       Family History   Problem Relation Age of Onset     Heart attack Mother      Stroke Mother      Prostate cancer Father      Social History     Social History     Marital status:      Spouse name: N/A     Number of children: N/A     Years of education: N/A     Occupational History     Not on file.     Social History Main Topics     Smoking status: Former Smoker     Years: 2.00     Types: Cigarettes     Quit date: 1948     Smokeless tobacco: Never Used     Alcohol use 0.6 oz/week     1 Cans of beer per week     Drug use: No     Sexual activity: Not on file     Other Topics Concern     Not on file     Social History Narrative     No Known Allergies     Current Outpatient Prescriptions   Medication Sig Dispense Refill     acetaminophen (TYLENOL) 325 MG tablet Take 650 mg by mouth every 6 (six) hours as needed for pain.       amLODIPine (NORVASC) 5 MG tablet Take 5 mg by mouth daily.       aspirin 81 MG EC tablet Take 81 mg by mouth daily.       calcium carbonate (OS-LIZ) 500 mg calcium (1,250 mg) chewable tablet Chew 1 tablet 3 (three) times a day as needed for heartburn.       cetirizine (ZYRTEC) 10 MG tablet Take 10 mg by mouth 2 (two) times a day.       cholecalciferol, vitamin D3, 2,000 unit Tab Take 2,000 Units by mouth daily.       docusate sodium (COLACE) 100 MG capsule Take 200 mg by mouth 2 (two) times a day.       emollient (BIAFINE) cream Apply 1 application topically 4 (four) times a day.       fluocinonide (LIDEX) 0.05 % ointment Apply 1 application topically 2 (two) times a day.       imiquimod (ALDARA) 5  % cream Apply 1 application topically 3 (three) times a week.       insulin aspart (NOVOLOG) 100 unit/mL injection Inject under the skin at bedtime. Per sliding scale: Do Not give Bedtime Correction Insulin if BG less than 200BG 200-239 give 1 unit.-279 give 2 units.-319 give 3 units.-359 give 4 units.BG >/=360 give 5 units       insulin aspart (NOVOLOG) 100 unit/mL injection Inject under the skin 3 (three) times a day before meals. Per sliding scale: Do Not give Correction Insulin if Pre-Meal BG less than 140 ISF 40 1 per 40 >/= 140.-179 give 1 unit.-219 give 2 units.-259 give 3 units.-299 give 4 units.-339 give 5 units.BG >/= 340 give 6 units.       insulin aspart (NOVOLOG) 100 unit/mL injection Inject under the skin. With meals and snacks or supplements: DOSE: 1 units per 15 grams of carbohydrate.       liraglutide (VICTOZA) 0.6 mg/0.1 mL (18 mg/3 mL) PnIj injection Inject 0.6 mg under the skin daily.       lisinopril (PRINIVIL,ZESTRIL) 20 MG tablet Take 20 mg by mouth daily.       mupirocin (BACTROBAN) 2 % ointment Apply 1 application topically 2 (two) times a day.       omeprazole (PRILOSEC) 20 MG capsule Take 20 mg by mouth daily before breakfast.       pravastatin (PRAVACHOL) 20 MG tablet Take 20 mg by mouth bedtime.       senna (SENOKOT) 8.6 mg tablet Take 1 tablet by mouth 2 (two) times a day as needed for constipation.       sodium chloride (OCEAN) 0.65 % nasal spray 1 spray into each nostril every hour as needed for congestion.       sodium hypochlorite (DAKIN'S, FULL STRENGTH,) external solution Apply 1 application topically 2 (two) times a day.       tamsulosin (FLOMAX) 0.4 mg Cp24 Take 0.4 mg by mouth Daily after breakfast.        traZODone (DESYREL) 50 MG tablet Take 50 mg by mouth at bedtime.       No current facility-administered medications for this visit.      REVIEW OF SYSTEMS:    Currently, no fever, chills, or rigors. Does not have any visual or  hearing problems. Denies any chest pain, headaches, palpitations, lightheadedness, dizziness, shortness of breath, or cough. Appetite is good. Denies any GERD symptoms. Denies any difficulty with swallowing, nausea, or vomiting.  Denies any abdominal pain, diarrhea or constipation. Denies any urinary symptoms. No insomnia. No active bleeding. No rash.     PHYSICAL EXAMINATION:  Vitals:    01/31/18 2210   BP: 126/66   Pulse: 82   Resp: 16   Temp: 97.5  F (36.4  C)   SpO2: 97%   Weight: 159 lb (72.1 kg)     GENERAL: Awake, Alert, oriented x3, not in any form of acute distress, answers questions appropriately, follows simple commands, conversant  HEENT: Head is normocephalic with normal hair distribution. No evidence of trauma. Ears: No acute purulent discharge. Eyes: Conjunctivae pink with no scleral jaundice. Nose: Normal mucosa and septum. NECK: Supple with no cervical or supraclavicular lymphadenopathy. Trachea is midline.   CHEST: No tenderness or deformity, no crepitus  LUNG: Clear to auscultation with good chest expansion. There are no crackles or wheezes, normal AP diameter.  BACK: No kyphosis of the thoracic spine. Symmetric, no curvature, ROM normal, no CVA tenderness, no spinal tenderness   CVS: There is good S1  S2, there are no murmurs, rubs, gallops, or heaves, rhythm is regular,  2+ pulses symmetric in all extremities.  ABDOMEN: Globular and soft, nontender to palpation, non distended, no masses, no organomegaly, good bowel sounds, no rebound or guarding, no peritoneal signs.   EXTREMITIES:  Full range of motion on both upper and lower extremities, there is no tenderness to palpation, no pedal edema, no cyanosis or clubbing, no calf tenderness.  Pulses equal in all extremities, normal cap refill, no joint swelling.  SKIN: Warm and dry, no erythema noted.  Skin color, texture, no rashes or lesions.  NEUROLOGICAL: The patient is oriented to person, place and time. Strength and sensation are grossly intact.  Face is symmetric.    LABS:    Lab Results   Component Value Date    WBC 4.4 03/06/2017    HGB 13.3 (L) 03/06/2017    HCT 38.8 (L) 03/06/2017    MCV 94 03/06/2017     (L) 03/06/2017     Results for orders placed or performed in visit on 01/22/18   Basic Metabolic Panel   Result Value Ref Range    Sodium 137 136 - 145 mmol/L    Potassium 3.8 3.5 - 5.0 mmol/L    Chloride 108 (H) 98 - 107 mmol/L    CO2 22 22 - 31 mmol/L    Anion Gap, Calculation 7 5 - 18 mmol/L    Glucose 104 70 - 125 mg/dL    Calcium 8.6 8.5 - 10.5 mg/dL    BUN 14 8 - 28 mg/dL    Creatinine 1.46 (H) 0.70 - 1.30 mg/dL    GFR MDRD Af Amer 55 (L) >60 mL/min/1.73m2    GFR MDRD Non Af Amer 45 (L) >60 mL/min/1.73m2     Lab Results   Component Value Date    HGBA1C 6.9 (H) 07/03/2016     Lab Results   Component Value Date    [id number] 295 07/04/2016       ASSESSMENT/PLAN:    1. CAD (coronary artery disease) - s/p CABG. No overt sign or symptoms of decompensation, will continue ASA   2. S/P CABG (coronary artery bypass graft) - See #1   3. Essential hypertension - Blood pressures are within target range, will continue Norvasc   4. BPH (benign prostatic hyperplasia)  - Continue Flomax   5. Weakness - Continue PT/OT, no recent falls   6. Type 2 diabetes mellitus without complication, unspecified long term insulin use status - Last Hgb A1c 6.9%. Continue Insulin with target 125 - 250                Electronically signed by:  Damon Friend, CNP

## 2021-06-15 NOTE — PROGRESS NOTES
[facility]      Facility:    [facility] [[id number]]  Code Status: DNR      Chief Complaint/Reason for Visit:  Chief Complaint   Patient presents with     H & P       HPI:   Christopher is a 89 y.o. male who presented to the hospital with worsened confusion from his baseline dementia.  In the course of the workup it was determined that he had hyperosmolar hyperglycemic state which was the primary source for problems.  He had fallen and hit his head and CT scan confirmed that there was no intracranial hemorrhage.  The recommendation for discharge was that he would be able to wean off of Lantus and increase Victoza dosing to manage his diabetes.  Also he had worsened chronic kidney disease with a BUN of 70 and creatinine of 3.3 but then his baseline appears to be about 2.0.  It was thought to be prerenal in the setting of hypovolemia.  His thiazide diuretic was discontinued and after rehydration the acute kidney injury improved.  His creatinine returned to 1.6.  He had an eczematous rash for which triamcinolone cream was applied and it was improving.  He has hypercalcemia on the admission of 10.9 which decreased to 9.8.  It was thought that this was due to hydrochlorothiazide along with delirium and he has had a weight loss in the last 6 months.  To rule out underlying malignancy he did have a CT scan of his chest and abdomen and pelvis and there was no real evidence for malignancy.  He does have multiple nonobstructing stones which urology will need to follow as an outpatient basis.  He also has an adrenal nodule which is an incidental finding.  He did have pancytopenia with platelets at 111.  Hemoglobin and white blood cell count were dropping as well.  The testing of vitamin B12, iron panel and other studies were unremarkable and it was consistent with anemia of chronic disease.  The peripheral smear was not suggestive of myelodysplastic syndrome.    He complains of some runny nose and he  does take Tums for stomach pains as needed but these are not serious problems for him.  He does not have fevers or chills.  No sore throat.  No cough or chest pain or shortness of breath.  No abdominal pain currently nor nausea nor bowel problems and he does not have dysuria.  No edema.  No headache.    Past Medical History:  Past Medical History:   Diagnosis Date     Actinic keratosis      BPH (benign prostatic hyperplasia)      CAD (coronary artery disease)      CKD (chronic kidney disease)      Dementia      Diabetes mellitus type II, controlled      Eczema      Edema      Functional dyspepsia      GERD (gastroesophageal reflux disease)      Hyperkalemia      Hyperlipemia      Hypertension      Neuropathy      Pancytopenia      Primary insomnia      Rosacea      S/P CABG (coronary artery bypass graft)      Skin cancer            Surgical History:  Past Surgical History:   Procedure Laterality Date     CATARACT EXTRACTION W/  INTRAOCULAR LENS IMPLANT Left      CORONARY ARTERY BYPASS GRAFT       HERNIA REPAIR         Family History:   Family History   Problem Relation Age of Onset     Heart attack Mother      Stroke Mother      Prostate cancer Father        Social History:    Social History     Social History     Marital status:      Spouse name: N/A     Number of children: N/A     Years of education: N/A     Social History Main Topics     Smoking status: Former Smoker     Years: 2.00     Types: Cigarettes     Quit date: 1948     Smokeless tobacco: Never Used     Alcohol use 0.6 oz/week     1 Cans of beer per week     Drug use: No     Sexual activity: Not on file     Other Topics Concern     Not on file     Social History Narrative          Review of Systems   All other systems reviewed and are negative.      Vitals:    01/24/18 1620   BP: 123/74   Pulse: 86   Resp: 18   Temp: 97.2  F (36.2  C)   SpO2: 98%   Weight: 161 lb 9.6 oz (73.3 kg)       Physical Exam   Constitutional: No distress.   HENT:   Nose:  Nose normal.   Mouth/Throat: Oropharynx is clear and moist.   Eyes: Right eye exhibits no discharge. Left eye exhibits no discharge.   Neck: No thyromegaly present.   Cardiovascular: Normal rate, regular rhythm and normal heart sounds.    Pulmonary/Chest: Effort normal and breath sounds normal.   Abdominal: Soft. Bowel sounds are normal. He exhibits no distension. There is no tenderness.   Musculoskeletal: He exhibits no edema.   Lymphadenopathy:     He has no cervical adenopathy.   Neurological: He is alert.   Skin: Skin is warm and dry.   Psychiatric: He has a normal mood and affect.   Nursing note and vitals reviewed.      Medication List:  Current Outpatient Prescriptions   Medication Sig     acetaminophen (TYLENOL) 325 MG tablet Take 650 mg by mouth every 6 (six) hours as needed for pain.     amLODIPine (NORVASC) 5 MG tablet Take 5 mg by mouth daily.     aspirin 81 MG EC tablet Take 81 mg by mouth daily.     calcium carbonate (OS-LIZ) 500 mg calcium (1,250 mg) chewable tablet Chew 1 tablet 3 (three) times a day as needed for heartburn.     cetirizine (ZYRTEC) 10 MG tablet Take 10 mg by mouth 2 (two) times a day.     cholecalciferol, vitamin D3, 2,000 unit Tab Take 2,000 Units by mouth daily.     docusate sodium (COLACE) 100 MG capsule Take 200 mg by mouth 2 (two) times a day.     emollient (BIAFINE) cream Apply 1 application topically 4 (four) times a day.     fluocinonide (LIDEX) 0.05 % ointment Apply 1 application topically 2 (two) times a day.     imiquimod (ALDARA) 5 % cream Apply 1 application topically 3 (three) times a week.     insulin aspart (NOVOLOG) 100 unit/mL injection Inject under the skin at bedtime. Per sliding scale: Do Not give Bedtime Correction Insulin if BG less than 200BG 200-239 give 1 unit.-279 give 2 units.-319 give 3 units.-359 give 4 units.BG >/=360 give 5 units     insulin aspart (NOVOLOG) 100 unit/mL injection Inject under the skin 3 (three) times a day before  meals. Per sliding scale: Do Not give Correction Insulin if Pre-Meal BG less than 140 ISF 40 1 per 40 >/= 140.-179 give 1 unit.-219 give 2 units.-259 give 3 units.-299 give 4 units.-339 give 5 units.BG >/= 340 give 6 units.     insulin aspart (NOVOLOG) 100 unit/mL injection Inject under the skin. With meals and snacks or supplements: DOSE: 1 units per 15 grams of carbohydrate.     liraglutide (VICTOZA) 0.6 mg/0.1 mL (18 mg/3 mL) PnIj injection Inject 0.6 mg under the skin daily.     lisinopril (PRINIVIL,ZESTRIL) 20 MG tablet Take 20 mg by mouth daily.     mupirocin (BACTROBAN) 2 % ointment Apply 1 application topically 2 (two) times a day.     omeprazole (PRILOSEC) 20 MG capsule Take 20 mg by mouth daily before breakfast.     pravastatin (PRAVACHOL) 20 MG tablet Take 20 mg by mouth bedtime.     senna (SENOKOT) 8.6 mg tablet Take 1 tablet by mouth 2 (two) times a day as needed for constipation.     sodium chloride (OCEAN) 0.65 % nasal spray 1 spray into each nostril every hour as needed for congestion.     sodium hypochlorite (DAKIN'S, FULL STRENGTH,) external solution Apply 1 application topically 2 (two) times a day.     tamsulosin (FLOMAX) 0.4 mg Cp24 Take 0.4 mg by mouth Daily after breakfast.      traZODone (DESYREL) 50 MG tablet Take 50 mg by mouth at bedtime.       Labs:  Blood glucose 98    Assessment:    ICD-10-CM    1. Type 2 diabetes mellitus without complication, unspecified long term insulin use status E11.9    2. Stage 3 chronic kidney disease N18.3    3. Essential hypertension I10    4. S/P CABG (coronary artery bypass graft) Z95.1    5. Dementia without behavioral disturbance, unspecified dementia type F03.90        Plan:  I met with his team of therapists and their assessment is that memory care assisted living would be the best for him but it appears that family is moving again for independent assisted living if possible.  His slums score is 9 and his CPT is 4.0.  He  can walk 300 feet with standby assist and he may require every 2 hours for toileting.  Attempts will be made to reduce his Lantus insulin and increase Victoza to 1.2 mg daily.      Electronically signed by: Roberto Almanzar MD

## 2021-06-15 NOTE — PROGRESS NOTES
[facility]    Facility:   [facility] [[id number]]   Code Status: DNR      CHIEF COMPLAINT/REASON FOR VISIT:  Chief Complaint   Patient presents with     Review Of Multiple Medical Conditions       HISTORY:      HPI: Christopher is a 89 y.o. male who presented to the hospital in a hyperosmolar nonketotic hyperglycemic state.  Adjustments were made and monitoring of his blood tests were done especially in light of his chronic kidney disease stage III.  He does have dementia issues and staff at transitional care unit recommends 24-hour supervision.  He is confused when he is working with therapy such that he tried to back up 7 steps with his walker and then another time asked the staff if he could have the phone to call his dad.  He currently resides in assisted living but there is question that he may need memory care.      There have been new recommendations from the pharmacist at transitional care to decrease his dose of Zyrtec from twice daily to once daily.  Also to check the BMP to confirm stability of renal function and to reduce his Lantus insulin and increase his Victoza as recommended from the hospital stay.    He has no further complaints today on review of systems such that he is not having fevers or chills or nasal congestion or sore throat nor cough nor shortness of breath or chest pain or abdominal pain or nausea and no problems with bowels or urinary symptoms.      Past Medical History:   Diagnosis Date     Actinic keratosis      BPH (benign prostatic hyperplasia)      CAD (coronary artery disease)      CKD (chronic kidney disease)      Dementia      Diabetes mellitus type II, controlled      Eczema      Edema      Functional dyspepsia      GERD (gastroesophageal reflux disease)      Hyperkalemia      Hyperlipemia      Hypertension      Neuropathy      Pancytopenia      Primary insomnia      Rosacea      S/P CABG (coronary artery bypass graft)      Skin cancer               Family History   Problem Relation Age of Onset     Heart attack Mother      Stroke Mother      Prostate cancer Father      Social History     Social History     Marital status:      Spouse name: N/A     Number of children: N/A     Years of education: N/A     Social History Main Topics     Smoking status: Former Smoker     Years: 2.00     Types: Cigarettes     Quit date: 1948     Smokeless tobacco: Never Used     Alcohol use 0.6 oz/week     1 Cans of beer per week     Drug use: No     Sexual activity: Not on file     Other Topics Concern     Not on file     Social History Narrative         Review of Systems    .  Vitals:    01/31/18 0937   BP: 135/57   Pulse: 66   Resp: 16   Temp: 96.7  F (35.9  C)   SpO2: 97%       Physical Exam   Constitutional: No distress.   Eyes: Right eye exhibits no discharge. Left eye exhibits no discharge.   Cardiovascular: Normal rate and normal heart sounds.    Pulmonary/Chest: Breath sounds normal.   Abdominal: He exhibits no distension. There is no tenderness.   Neurological: He is alert.   Psychiatric: He has a normal mood and affect.   Nursing note and vitals reviewed.        LABS:   Glucose is 105    ASSESSMENT:      ICD-10-CM    1. Type 2 diabetes mellitus without complication, unspecified long term insulin use status E11.9    2. Stage 3 chronic kidney disease N18.3    3. Dementia without behavioral disturbance, unspecified dementia type F03.90    4. S/P CABG (coronary artery bypass graft) Z95.1        PLAN:    Continue with efforts with therapy and social work looking into options for time of discharge.  Adjustment of medications as indicated and to monitor his blood sugars with these adjustments.  Zyrtec will be 10 mg daily.  Lantus will be reduced to 11 units daily.  Victoza will be increased to 1.2 mg subcutaneously daily.    Electronically signed by: Roberto Almanzar MD

## 2021-06-16 NOTE — PROGRESS NOTES
[facility]    Facility:   [facility] [[id number]]   Code Status: DNR  PCP: Juan Gongora MD   Phone: [phone]   Fax: [phone]      CHIEF COMPLAINT/REASON FOR VISIT:  Chief Complaint   Patient presents with     Discharge Summary       HISTORY COURSE:  Christopher is a 89 y.o. male who presented to the hospital with worsened confusion from his baseline dementia.  In the course of the workup it was determined that he had hyperosmolar hyperglycemic state which was the primary source for problems.  He had fallen and hit his head and CT scan confirmed that there was no intracranial hemorrhage.  The recommendation for discharge was that he would be able to wean off of Lantus and increase Victoza dosing to manage his diabetes.  Also he had worsened chronic kidney disease with a BUN of 70 and creatinine of 3.3 but then his baseline appears to be about 2.0.  It was thought to be prerenal in the setting of hypovolemia.  His thiazide diuretic was discontinued and after rehydration the acute kidney injury improved.  His creatinine returned to 1.6.  He had an eczematous rash for which triamcinolone cream was applied and it was improving.  He has hypercalcemia on the admission of 10.9 which decreased to 9.8.  It was thought that this was due to hydrochlorothiazide along with delirium and he has had a weight loss in the last 6 months.  To rule out underlying malignancy he did have a CT scan of his chest and abdomen and pelvis and there was no real evidence for malignancy.  He does have multiple nonobstructing stones which urology will need to follow as an outpatient basis.  He also has an adrenal nodule which is an incidental finding.  He did have pancytopenia with platelets at 111.  Hemoglobin and white blood cell count were dropping as well.  The testing of vitamin B12, iron panel and other studies were unremarkable and it was consistent with anemia of chronic disease.  The peripheral smear  was not suggestive of myelodysplastic syndrome.    During the stay at transitional care unit he was stable with his health problems.  The assessment was memory care as the safest option for him at this point.  On review of systems currently he is without fevers or chills, no headache nor nasal congestion or sore throat, no cough nor shortness of breath or chest pain, no abdominal pain or nausea, nor dysuria.  Blood glucose of 108.    Review of Systems    Vitals:    02/07/18 1014   BP: 136/70   Pulse: 86   Resp: 16   Temp: 97.1  F (36.2  C)       Physical Exam   Constitutional: No distress.   Eyes: Right eye exhibits no discharge. Left eye exhibits no discharge.   Cardiovascular: Normal rate, regular rhythm and normal heart sounds.    Pulmonary/Chest: Effort normal and breath sounds normal.   Abdominal: He exhibits no distension. There is no tenderness.   Musculoskeletal: He exhibits no edema.   Neurological:   He is alert and follows simple commands.  He has a regular 3 for word recall.  His clock face was okay.   Skin: Skin is warm and dry.   Psychiatric: He has a normal mood and affect.   Nursing note and vitals reviewed.      MEDICATION LIST:  Current Outpatient Prescriptions   Medication Sig     acetaminophen (TYLENOL) 325 MG tablet Take 650 mg by mouth every 6 (six) hours as needed for pain.     amLODIPine (NORVASC) 5 MG tablet Take 5 mg by mouth daily.     aspirin 81 MG EC tablet Take 81 mg by mouth daily.     calcium carbonate (OS-LIZ) 500 mg calcium (1,250 mg) chewable tablet Chew 1 tablet 3 (three) times a day as needed for heartburn.     cetirizine (ZYRTEC) 10 MG tablet Take 10 mg by mouth 2 (two) times a day.     cholecalciferol, vitamin D3, 2,000 unit Tab Take 2,000 Units by mouth daily.     docusate sodium (COLACE) 100 MG capsule Take 200 mg by mouth 2 (two) times a day.     emollient (BIAFINE) cream Apply 1 application topically 4 (four) times a day.     fluocinonide (LIDEX) 0.05 % ointment Apply 1  application topically 2 (two) times a day.     imiquimod (ALDARA) 5 % cream Apply 1 application topically 3 (three) times a week.     insulin aspart (NOVOLOG) 100 unit/mL injection Inject under the skin at bedtime. Per sliding scale: Do Not give Bedtime Correction Insulin if BG less than 200BG 200-239 give 1 unit.-279 give 2 units.-319 give 3 units.-359 give 4 units.BG >/=360 give 5 units     insulin aspart (NOVOLOG) 100 unit/mL injection Inject under the skin 3 (three) times a day before meals. Per sliding scale: Do Not give Correction Insulin if Pre-Meal BG less than 140 ISF 40 1 per 40 >/= 140.-179 give 1 unit.-219 give 2 units.-259 give 3 units.-299 give 4 units.-339 give 5 units.BG >/= 340 give 6 units.     insulin aspart (NOVOLOG) 100 unit/mL injection Inject under the skin. With meals and snacks or supplements: DOSE: 1 units per 15 grams of carbohydrate.     liraglutide (VICTOZA) 0.6 mg/0.1 mL (18 mg/3 mL) PnIj injection Inject 0.6 mg under the skin daily.     lisinopril (PRINIVIL,ZESTRIL) 20 MG tablet Take 20 mg by mouth daily.     mupirocin (BACTROBAN) 2 % ointment Apply 1 application topically 2 (two) times a day.     omeprazole (PRILOSEC) 20 MG capsule Take 20 mg by mouth daily before breakfast.     pravastatin (PRAVACHOL) 20 MG tablet Take 20 mg by mouth bedtime.     senna (SENOKOT) 8.6 mg tablet Take 1 tablet by mouth 2 (two) times a day as needed for constipation.     sodium chloride (OCEAN) 0.65 % nasal spray 1 spray into each nostril every hour as needed for congestion.     sodium hypochlorite (DAKIN'S, FULL STRENGTH,) external solution Apply 1 application topically 2 (two) times a day.     tamsulosin (FLOMAX) 0.4 mg Cp24 Take 0.4 mg by mouth Daily after breakfast.      traZODone (DESYREL) 50 MG tablet Take 50 mg by mouth at bedtime.       DISCHARGE DIAGNOSIS:    ICD-10-CM    1. Dementia without behavioral disturbance, unspecified dementia type F03.90     2. Weakness R53.1    3. Type 2 diabetes mellitus without complication, unspecified long term insulin use status E11.9    4. Essential hypertension I10    5. S/P CABG (coronary artery bypass graft) Z95.1        MEDICAL EQUIPMENT NEEDS:  No extensive or extraordinary medical equipment is required.    DISCHARGE PLAN/FACE TO FACE:  I certify that services are/were furnished while this patient was under the care of a physician and that a physician or an allowed non-physician practitioner (NPP), had a face-to-face encounter that meets the physician face-to-face encounter requirements. The encounter was in whole, or in part, related to the primary reason for home health. The patient is confined to his/her home and needs intermittent skilled nursing, physical therapy, speech-language pathology, or the continued need for occupational therapy. A plan of care has been established by a physician and is periodically reviewed by a physician.  Date of Face-to-Face Encounter: 2/8/18    I certify that, based on my findings, the following services are medically necessary home health services: Registered nurse    My clinical findings support the need for the above skilled services because: (Please write a brief narrative summary that describes what the RN, PT, SLP, or other services will be doing in the home. A list of diagnoses in this section does not meet the CMS requirements.)  If the memory care unit is not obtained, he will need home care and will need to have a registered nurse visit for medication set up and teaching, symptom and disease process monitoring and education, as well as evaluation for other needs in the home.    This patient is homebound because: (Please write a brief narrative summary describing the functional limitations as to why this patient is homebound and specifically what makes this patient homebound.)  He does not have proper judgment given his dementia nor does he have proper he strength or stability of  gait for independent living.    The patient is, or has been, under my care and I have initiated the establishment of the plan of care. This patient will be followed by a physician who will periodically review the plan of care.      Electronically signed by: Roberto Almanzar MD